# Patient Record
Sex: FEMALE | Race: WHITE | Employment: OTHER | URBAN - METROPOLITAN AREA
[De-identification: names, ages, dates, MRNs, and addresses within clinical notes are randomized per-mention and may not be internally consistent; named-entity substitution may affect disease eponyms.]

---

## 2017-03-15 PROBLEM — R25.1 SPELLS OF TREMBLING: Status: ACTIVE | Noted: 2017-03-15

## 2017-03-18 PROCEDURE — 84206 ASSAY OF PROINSULIN: CPT | Performed by: INTERNAL MEDICINE

## 2017-03-18 PROCEDURE — 36415 COLL VENOUS BLD VENIPUNCTURE: CPT | Performed by: INTERNAL MEDICINE

## 2017-04-07 PROCEDURE — 83835 ASSAY OF METANEPHRINES: CPT | Performed by: INTERNAL MEDICINE

## 2017-04-07 PROCEDURE — 36415 COLL VENOUS BLD VENIPUNCTURE: CPT | Performed by: INTERNAL MEDICINE

## 2017-04-07 PROCEDURE — 82384 ASSAY THREE CATECHOLAMINES: CPT | Performed by: INTERNAL MEDICINE

## 2017-04-07 PROCEDURE — 82570 ASSAY OF URINE CREATININE: CPT | Performed by: INTERNAL MEDICINE

## 2017-04-25 ENCOUNTER — OFFICE VISIT (OUTPATIENT)
Dept: INTERNAL MEDICINE CLINIC | Facility: CLINIC | Age: 57
End: 2017-04-25

## 2017-04-25 VITALS
RESPIRATION RATE: 16 BRPM | TEMPERATURE: 98 F | WEIGHT: 128 LBS | HEIGHT: 66 IN | DIASTOLIC BLOOD PRESSURE: 78 MMHG | OXYGEN SATURATION: 96 % | HEART RATE: 71 BPM | BODY MASS INDEX: 20.57 KG/M2 | SYSTOLIC BLOOD PRESSURE: 122 MMHG

## 2017-04-25 DIAGNOSIS — R00.2 PALPITATIONS: ICD-10-CM

## 2017-04-25 DIAGNOSIS — R42 EPISODIC LIGHTHEADEDNESS: ICD-10-CM

## 2017-04-25 DIAGNOSIS — I10 ESSENTIAL HYPERTENSION: Primary | ICD-10-CM

## 2017-04-25 PROBLEM — R25.1 SPELLS OF TREMBLING: Status: RESOLVED | Noted: 2017-03-15 | Resolved: 2017-04-25

## 2017-04-25 PROBLEM — G47.00 INSOMNIA: Status: ACTIVE | Noted: 2017-04-25

## 2017-04-25 PROCEDURE — 99204 OFFICE O/P NEW MOD 45 MIN: CPT | Performed by: INTERNAL MEDICINE

## 2017-04-26 NOTE — PROGRESS NOTES
Patient presents with:  Establish Care: b/p issues, palpitations; chronic and episodic lightheadedness/dissociation feeling      HPI: The pt is a 50 y/o WF, new patient, here to establish PCP and to discuss multiple medical concerns:    1.  HTN - Onset = 3 negative.      Patient Active Problem List:     Anxiety disorder     Essential hypertension     Palpitations     Insomnia          Past Surgical History    CHOLECYSTECTOMY            Comment 2    OTHER SURGICAL HISTORY      Comment Carpal tunnel ri Bystolic as scheduled for now. 2. Palpitations - Check 24-hr Holter monitor and 2D-Echo w/ dopplers. Reassurance given. 3. Episodic lightheadedness and dissociation feeling - Check carotid dopplers and Rheum/Autoimmune panel. Reassurance given.   4. RTC

## 2017-05-05 PROCEDURE — 86200 CCP ANTIBODY: CPT | Performed by: INTERNAL MEDICINE

## 2017-05-08 ENCOUNTER — TELEPHONE (OUTPATIENT)
Dept: INTERNAL MEDICINE CLINIC | Facility: CLINIC | Age: 57
End: 2017-05-08

## 2017-05-08 NOTE — TELEPHONE ENCOUNTER
Pt called in c/o L arm shaking, numbness, tingling, generalized weakness, being light headed/dizzy, vision changes and \"brain fog\". Denies chest pain/SOB/HA/unable to raise arms/dragging LE/asymmetry of smile and did not have slurred speech on the phone.

## 2017-05-10 ENCOUNTER — HOSPITAL ENCOUNTER (OUTPATIENT)
Dept: CV DIAGNOSTICS | Facility: HOSPITAL | Age: 57
Discharge: HOME | End: 2017-05-10
Attending: INTERNAL MEDICINE
Payer: COMMERCIAL

## 2017-05-10 ENCOUNTER — HOSPITAL ENCOUNTER (OUTPATIENT)
Dept: CARDIOLOGY CLINIC | Facility: HOSPITAL | Age: 57
Discharge: HOME | End: 2017-05-10
Attending: INTERNAL MEDICINE
Payer: COMMERCIAL

## 2017-05-10 DIAGNOSIS — R00.2 PALPITATIONS: ICD-10-CM

## 2017-05-10 DIAGNOSIS — R42 EPISODIC LIGHTHEADEDNESS: ICD-10-CM

## 2017-05-10 DIAGNOSIS — I10 ESSENTIAL HYPERTENSION: ICD-10-CM

## 2017-05-10 PROCEDURE — 93880 EXTRACRANIAL BILAT STUDY: CPT | Performed by: INTERNAL MEDICINE

## 2017-05-10 PROCEDURE — 93225 XTRNL ECG REC<48 HRS REC: CPT | Performed by: INTERNAL MEDICINE

## 2017-05-10 PROCEDURE — 93226 XTRNL ECG REC<48 HR SCAN A/R: CPT | Performed by: INTERNAL MEDICINE

## 2017-05-10 PROCEDURE — 93306 TTE W/DOPPLER COMPLETE: CPT | Performed by: INTERNAL MEDICINE

## 2017-05-10 PROCEDURE — 93227 XTRNL ECG REC<48 HR R&I: CPT | Performed by: INTERNAL MEDICINE

## 2017-05-11 ENCOUNTER — OFFICE VISIT (OUTPATIENT)
Dept: INTERNAL MEDICINE CLINIC | Facility: CLINIC | Age: 57
End: 2017-05-11

## 2017-05-11 VITALS
WEIGHT: 128.5 LBS | BODY MASS INDEX: 20.65 KG/M2 | SYSTOLIC BLOOD PRESSURE: 138 MMHG | DIASTOLIC BLOOD PRESSURE: 98 MMHG | RESPIRATION RATE: 16 BRPM | HEIGHT: 66 IN | OXYGEN SATURATION: 98 % | TEMPERATURE: 98 F | HEART RATE: 60 BPM

## 2017-05-11 DIAGNOSIS — M54.12 CERVICAL RADICULOPATHY: Primary | ICD-10-CM

## 2017-05-11 DIAGNOSIS — R29.898 LEFT ARM WEAKNESS: ICD-10-CM

## 2017-05-11 DIAGNOSIS — R20.0 LEFT ARM NUMBNESS: ICD-10-CM

## 2017-05-11 PROCEDURE — 99214 OFFICE O/P EST MOD 30 MIN: CPT | Performed by: INTERNAL MEDICINE

## 2017-05-11 NOTE — PATIENT INSTRUCTIONS
Understanding Cervical Radiculopathy    Cervical radiculopathy is irritation or inflammation of a nerve root in the neck. It causes neck pain and other symptoms that may spread into the chest or down the arm.  To understand this condition, it helps to und In most cases, your healthcare provider will first try treatments that help relieve symptoms. These may include:  · Prescription or over-the-counter pain medicines. These help relieve pain and swelling. · Cold packs. These help reduce pain. · Resting.  Susan Calderon

## 2017-05-11 NOTE — PROGRESS NOTES
Patient presents with:  Numbness: left arm just started last night on and off       HPI: The pt presents today for eval of 3 episodes over the last 3 months of neck discomfort along w/ L arm weakness and L arm numbness.   She's noticed more of the weakness/ elements are normal. The disc spaces are normal.    Impression: Normal cervical spine  Electronically Signed by: Flo Delgado             A/P:  Cervical radiculopathy  (primary encounter diagnosis)  Left arm weakness  Left arm numbness    1.  Cervical rad

## 2017-05-12 ENCOUNTER — TELEPHONE (OUTPATIENT)
Dept: INTERNAL MEDICINE CLINIC | Facility: CLINIC | Age: 57
End: 2017-05-12

## 2017-05-12 DIAGNOSIS — R42 EPISODIC LIGHTHEADEDNESS: Primary | ICD-10-CM

## 2017-05-12 DIAGNOSIS — R29.898 LEFT ARM WEAKNESS: ICD-10-CM

## 2017-05-15 NOTE — TELEPHONE ENCOUNTER
Although I still don't think MS is likely, we'll go ahead and add an MRI brain w/ and w/o contrast.  Testing ordered. Also, please offer her that there is always an option to see a  Neurologist (Dr. Abisai Pack), if she still has any concerns. Jacque Doty.  Ramona

## 2017-05-15 NOTE — TELEPHONE ENCOUNTER
Pt informed. Pt concerned about possible MS symptoms and MRI cervical spine ordered. Additional testing? MRI brain?

## 2017-05-22 ENCOUNTER — OFFICE VISIT (OUTPATIENT)
Dept: ELECTROPHYSIOLOGY | Facility: HOSPITAL | Age: 57
End: 2017-05-22
Attending: INTERNAL MEDICINE
Payer: COMMERCIAL

## 2017-05-22 ENCOUNTER — HOSPITAL ENCOUNTER (OUTPATIENT)
Dept: MRI IMAGING | Facility: HOSPITAL | Age: 57
Discharge: HOME | End: 2017-05-22
Attending: INTERNAL MEDICINE
Payer: COMMERCIAL

## 2017-05-22 DIAGNOSIS — M54.12 CERVICAL RADICULOPATHY: ICD-10-CM

## 2017-05-22 DIAGNOSIS — R29.898 LEFT ARM WEAKNESS: ICD-10-CM

## 2017-05-22 DIAGNOSIS — R20.0 LEFT ARM NUMBNESS: ICD-10-CM

## 2017-05-22 PROCEDURE — 95910 NRV CNDJ TEST 7-8 STUDIES: CPT

## 2017-05-22 PROCEDURE — 95886 MUSC TEST DONE W/N TEST COMP: CPT

## 2017-05-22 PROCEDURE — 72141 MRI NECK SPINE W/O DYE: CPT | Performed by: INTERNAL MEDICINE

## 2017-05-22 NOTE — PROCEDURES
[address]      PATIENT'S NAME: Joel Del Cid   REFERRING PHYSICIAN: Rosa Maria Carey M.D.    PATIENT ACCOUNT #: [de-identified] LOCATION: [facility]   MEDICAL RECORD #: [id number] DATE OF BIRTH: [birth year]

## 2017-05-26 ENCOUNTER — PATIENT MESSAGE (OUTPATIENT)
Dept: INTERNAL MEDICINE CLINIC | Facility: CLINIC | Age: 57
End: 2017-05-26

## 2017-05-29 NOTE — TELEPHONE ENCOUNTER
Get patient the info to [facility] EMERGENCY [facility], as they handle travel vaccine requests. Jefry Garcia. Leslie Colin MD  Diplomate, American Board of Internal Medicine  [facility]  [address], [address], [address] [address] [facility] [address]  T: [phone]; F: [phone]

## 2017-05-30 ENCOUNTER — PATIENT MESSAGE (OUTPATIENT)
Dept: INTERNAL MEDICINE CLINIC | Facility: CLINIC | Age: 57
End: 2017-05-30

## 2017-05-30 NOTE — TELEPHONE ENCOUNTER
From: Genesis Blackman  To: Pascual Kurtz MD  Sent: 5/30/2017 2:45 PM CDT  Subject: Non-Urgent Medical Question    Hello - sorry to be a pain. .. as you know, i'm having neurological issues.  Numbness in hands, feet, face and occasionally upper check/neck area

## 2017-06-01 ENCOUNTER — HOSPITAL ENCOUNTER (OUTPATIENT)
Dept: MRI IMAGING | Facility: HOSPITAL | Age: 57
Discharge: HOME | End: 2017-06-01
Attending: INTERNAL MEDICINE
Payer: COMMERCIAL

## 2017-06-01 DIAGNOSIS — R29.898 LEFT ARM WEAKNESS: ICD-10-CM

## 2017-06-01 DIAGNOSIS — R42 EPISODIC LIGHTHEADEDNESS: ICD-10-CM

## 2017-06-01 PROCEDURE — 70553 MRI BRAIN STEM W/O & W/DYE: CPT | Performed by: INTERNAL MEDICINE

## 2017-06-01 PROCEDURE — A9575 INJ GADOTERATE MEGLUMI 0.1ML: HCPCS | Performed by: INTERNAL MEDICINE

## 2017-06-02 ENCOUNTER — PATIENT MESSAGE (OUTPATIENT)
Dept: INTERNAL MEDICINE CLINIC | Facility: CLINIC | Age: 57
End: 2017-06-02

## 2017-06-02 NOTE — TELEPHONE ENCOUNTER
From: Keya Ingram  To: Leia Mcardle, MD  Sent: 6/2/2017 8:50 AM CDT  Subject: Non-Urgent Medical Question    Hi Dr. Juan J Goodman. Happy Friday :)  I saw your comments on my Brain MRI. 1) Question - does it look like it's NOT MS?  I'm really worried about th

## 2017-06-05 ENCOUNTER — PATIENT MESSAGE (OUTPATIENT)
Dept: INTERNAL MEDICINE CLINIC | Facility: CLINIC | Age: 57
End: 2017-06-05

## 2017-06-05 DIAGNOSIS — I10 ESSENTIAL HYPERTENSION: Primary | ICD-10-CM

## 2017-06-05 NOTE — TELEPHONE ENCOUNTER
From: Oracio Carballo  To: Ruel Silva MD  Sent: 6/5/2017 8:21 AM CDT  Subject: Non-Urgent Medical Question    Hi. 1) could not figure out how to reply to message but regarding BP control Losartan 25 mg 1 tab each morning - tkx!  I will hold off till i com

## 2017-06-06 RX ORDER — LOSARTAN POTASSIUM 25 MG/1
25 TABLET ORAL EVERY MORNING
Qty: 90 TABLET | Refills: 1 | Status: SHIPPED | OUTPATIENT
Start: 2017-06-06 | End: 2017-07-06

## 2017-06-08 ENCOUNTER — PATIENT MESSAGE (OUTPATIENT)
Dept: INTERNAL MEDICINE CLINIC | Facility: CLINIC | Age: 57
End: 2017-06-08

## 2017-06-09 RX ORDER — NEBIVOLOL 5 MG/1
TABLET ORAL
Qty: 30 TABLET | Refills: 0 | Status: SHIPPED | OUTPATIENT
Start: 2017-06-09 | End: 2017-06-13

## 2017-06-09 NOTE — TELEPHONE ENCOUNTER
From: Marda Rinne  To: Jyoti Shields MD  Sent: 6/8/2017 6:32 PM CDT  Subject: Prescription Question    Hello dr Bentley Moran . I have a problem. I only have 10 day supply of bystolic and [place] 8 days.  So I don't have enough

## 2017-06-13 ENCOUNTER — TELEPHONE (OUTPATIENT)
Dept: INTERNAL MEDICINE CLINIC | Facility: CLINIC | Age: 57
End: 2017-06-13

## 2017-06-13 RX ORDER — NEBIVOLOL 5 MG/1
TABLET ORAL
Qty: 90 TABLET | Refills: 0 | Status: SHIPPED | OUTPATIENT
Start: 2017-06-13 | End: 2017-09-12

## 2017-06-13 NOTE — TELEPHONE ENCOUNTER
Call patient. 90-day supply of the Bystolic has been sent to her pharmacy. Jn Ray. Christa Love MD  Diplomate, American Board of Internal Medicine  [facility]  [address]  [facility],4Th Floor, [address], [facility], [address]  T: [phone]; F: [address]

## 2017-06-13 NOTE — TELEPHONE ENCOUNTER
Pt called to request a 90 days supply on Byastolic as prescription will be cheaper than 30 days supply. Please advise.

## 2017-06-16 ENCOUNTER — TELEPHONE (OUTPATIENT)
Dept: NEUROLOGY | Facility: CLINIC | Age: 57
End: 2017-06-16

## 2017-06-16 ENCOUNTER — OFFICE VISIT (OUTPATIENT)
Dept: NEUROLOGY | Facility: CLINIC | Age: 57
End: 2017-06-16

## 2017-06-16 ENCOUNTER — LAB ENCOUNTER (OUTPATIENT)
Dept: LAB | Age: 57
End: 2017-06-16
Attending: Other
Payer: COMMERCIAL

## 2017-06-16 VITALS
SYSTOLIC BLOOD PRESSURE: 132 MMHG | BODY MASS INDEX: 20 KG/M2 | RESPIRATION RATE: 16 BRPM | HEART RATE: 56 BPM | WEIGHT: 126 LBS | DIASTOLIC BLOOD PRESSURE: 88 MMHG

## 2017-06-16 DIAGNOSIS — R20.2 PARESTHESIA: ICD-10-CM

## 2017-06-16 DIAGNOSIS — R20.2 PARESTHESIA: Primary | ICD-10-CM

## 2017-06-16 PROCEDURE — 86235 NUCLEAR ANTIGEN ANTIBODY: CPT | Performed by: OTHER

## 2017-06-16 PROCEDURE — 36415 COLL VENOUS BLD VENIPUNCTURE: CPT | Performed by: OTHER

## 2017-06-16 PROCEDURE — 84165 PROTEIN E-PHORESIS SERUM: CPT | Performed by: OTHER

## 2017-06-16 PROCEDURE — 86038 ANTINUCLEAR ANTIBODIES: CPT | Performed by: OTHER

## 2017-06-16 PROCEDURE — 82746 ASSAY OF FOLIC ACID SERUM: CPT | Performed by: OTHER

## 2017-06-16 PROCEDURE — 86334 IMMUNOFIX E-PHORESIS SERUM: CPT | Performed by: OTHER

## 2017-06-16 PROCEDURE — 99244 OFF/OP CNSLTJ NEW/EST MOD 40: CPT | Performed by: OTHER

## 2017-06-16 PROCEDURE — 86431 RHEUMATOID FACTOR QUANT: CPT | Performed by: OTHER

## 2017-06-16 PROCEDURE — 84425 ASSAY OF VITAMIN B-1: CPT | Performed by: OTHER

## 2017-06-16 PROCEDURE — 82607 VITAMIN B-12: CPT | Performed by: OTHER

## 2017-06-16 PROCEDURE — 86147 CARDIOLIPIN ANTIBODY EA IG: CPT | Performed by: OTHER

## 2017-06-16 PROCEDURE — 83921 ORGANIC ACID SINGLE QUANT: CPT | Performed by: OTHER

## 2017-06-16 PROCEDURE — 83883 ASSAY NEPHELOMETRY NOT SPEC: CPT | Performed by: OTHER

## 2017-06-16 NOTE — PATIENT INSTRUCTIONS
Refill policies:    • Allow 2-3 business days for refills; controlled substances may take longer.   • Contact your pharmacy at least 5 days prior to running out of medication and have them send an electronic request or submit request through the [facility] have a procedure or additional testing performed. [facility] will contact your insurance carrier to obtain pre-certification or prior authorization.     Unfortunately, [facility] has seen an increase in denial of payment even though the p

## 2017-06-16 NOTE — PROGRESS NOTES
Pt ever for evaluation for intermittent numbness and tingling, primarily in hands and feet. Also reports intermittent dizziness, heart racing, and will sometimes wake up feeling like her body is \"buzzing. \"

## 2017-06-16 NOTE — PROGRESS NOTES
[facility]    Date of consult: 6/16/2017    CC: paresthesia    HPI: Beth Linares is a 49year old female with past medical history as listed below presents here for initial evaluation of paresthesia.  Pt ever for evaluation for 0 Standard drinks or equivalent per week         Comment: socially     Family History   Problem Relation Age of Onset   • Hypertension Father    • Stroke Father    • Parkinson's Dosease[other] [OTHER] Father    • Alcoholism[other] [OTHER] Father    • Ca Screen  B12 AND FOLATE  Methylmalonic Acid, Serum  PROTEIN ELECTROPHORESE,SERUM WITH RLFX TO JUDSON,SERUM  Rheumatoid Arthritis Factor  Vitamin B1 (Thiamine), Blood  Sjogren's AB, Anti-SS-A/-SS-B  Anticardiolipin Antibodies (CRISTIAN), Quant  Advised pt to cut rabia

## 2017-06-26 ENCOUNTER — TELEPHONE (OUTPATIENT)
Dept: NEUROLOGY | Facility: CLINIC | Age: 57
End: 2017-06-26

## 2017-06-26 DIAGNOSIS — D68.61 ANTI-PHOSPHOLIPID ANTIBODY SYNDROME (HCC): Primary | ICD-10-CM

## 2017-06-26 NOTE — PATIENT INSTRUCTIONS
Cardiolipin Antibody  Does this test have other names? Cardiolipin antibodies (IgG, IgM, IgA), anticardiolipin   What is this test?  A cardiolipin antibodies test looks for a certain kind of antibody in your blood.  The antibodies are IgG (immunoglobulin A result for a lab test may be affected by many things, including the method the laboratory uses to do the test. Even if your test results are different from the normal value, you may not have a problem.  To learn what the results mean for you, talk with pavan What is this test?  A cardiolipin antibodies test looks for a certain kind of antibody in your blood. The antibodies are IgG (immunoglobulin G), IgA (immunoglobulin A), and IgM (immunoglobulin M). They are antibodies that form in response to cardiolipins.

## 2017-06-26 NOTE — TELEPHONE ENCOUNTER
Left message for patient to call back. When returns call , clinical staff please relay lab results and recommendations  below per Dr Kolton Ruvalcaba.

## 2017-06-26 NOTE — TELEPHONE ENCOUNTER
Phospholipid Ab, IgM Negative  Medium Positive      Above lab abnormal, I would like to have Rheumatologist's opinion on this, please let pt know.   I have put in Rheumatology referral.

## 2017-06-26 NOTE — TELEPHONE ENCOUNTER
Patient called back. Relayed lab results below. She verbalized understanding. No further questions at this time.      Given contact information for Dr Murphy Fitch or one of his associates     Rheumatology  [facility]  [address]

## 2017-06-26 NOTE — TELEPHONE ENCOUNTER
Spoke with patient. She has scheduled appointment with Dr Rafaela Nunn.   Future Appointments  Date Time Provider Reagan Guadalupe   8/3/2017 8:40 AM Eleuterio Dsouza MD ONPV RHEUM JEN NPV   8/7/2017 1:20 PM Brian Sheridan MD [facility] [address] [facility] [address]

## 2017-07-06 ENCOUNTER — OFFICE VISIT (OUTPATIENT)
Dept: INTERNAL MEDICINE CLINIC | Facility: CLINIC | Age: 57
End: 2017-07-06

## 2017-07-06 VITALS
DIASTOLIC BLOOD PRESSURE: 80 MMHG | HEART RATE: 60 BPM | TEMPERATURE: 98 F | HEIGHT: 66.25 IN | BODY MASS INDEX: 19.81 KG/M2 | RESPIRATION RATE: 15 BRPM | SYSTOLIC BLOOD PRESSURE: 116 MMHG | WEIGHT: 123.25 LBS

## 2017-07-06 DIAGNOSIS — I10 ESSENTIAL HYPERTENSION: Primary | ICD-10-CM

## 2017-07-06 DIAGNOSIS — K63.9 INTESTINAL DISORDER: ICD-10-CM

## 2017-07-06 PROCEDURE — 99213 OFFICE O/P EST LOW 20 MIN: CPT | Performed by: INTERNAL MEDICINE

## 2017-07-06 RX ORDER — ZOLPIDEM TARTRATE 6.25 MG/1
TABLET, FILM COATED, EXTENDED RELEASE ORAL
Refills: 3 | COMMUNITY
Start: 2017-06-26 | End: 2017-07-06

## 2017-07-06 NOTE — PROGRESS NOTES
Patient presents with:  HTN: 3-month f/u  Other: Possible leaky gut syndrome      HPI: The pt presents today for 3-month f/u HTN and also informs me that she has a possible working diagnosis of \"leaky gut syndrome. \"  For the HTN, she's only taking Bystol Sheila Sparks. Severo Obey, MD  Diplomate, American Board of Internal Medicine  [facility]  [address] [facility],[address], [address], [facility], [address] [place] [address]  T: [phone]; F: [address]     Meds & Refills for this Visit:  No prescriptions requested or ordered in

## 2017-08-01 ENCOUNTER — PATIENT MESSAGE (OUTPATIENT)
Dept: INTERNAL MEDICINE CLINIC | Facility: CLINIC | Age: 57
End: 2017-08-01

## 2017-08-01 NOTE — TELEPHONE ENCOUNTER
From: Oracio Carballo  To: Ruel Silva MD  Sent: 8/1/2017 10:45 AM CDT  Subject: Referral Velasquez Styles. My , Javier Tineo, would like to see a podiatrist for foot issue.  Can you provide a recommended dr (for him and me for future nee

## 2017-08-08 PROBLEM — D68.61 ANTI-PHOSPHOLIPID ANTIBODY SYNDROME (HCC): Status: RESOLVED | Noted: 2017-06-26 | Resolved: 2017-08-08

## 2017-08-08 PROCEDURE — 87086 URINE CULTURE/COLONY COUNT: CPT | Performed by: INTERNAL MEDICINE

## 2017-08-08 PROCEDURE — 82570 ASSAY OF URINE CREATININE: CPT | Performed by: INTERNAL MEDICINE

## 2017-08-08 PROCEDURE — 86618 LYME DISEASE ANTIBODY: CPT | Performed by: INTERNAL MEDICINE

## 2017-08-08 PROCEDURE — 81001 URINALYSIS AUTO W/SCOPE: CPT | Performed by: INTERNAL MEDICINE

## 2017-08-08 PROCEDURE — 36415 COLL VENOUS BLD VENIPUNCTURE: CPT | Performed by: INTERNAL MEDICINE

## 2017-08-08 PROCEDURE — 84156 ASSAY OF PROTEIN URINE: CPT | Performed by: INTERNAL MEDICINE

## 2017-08-08 PROCEDURE — 86160 COMPLEMENT ANTIGEN: CPT | Performed by: INTERNAL MEDICINE

## 2017-08-10 ENCOUNTER — OFFICE VISIT (OUTPATIENT)
Dept: NEUROLOGY | Facility: CLINIC | Age: 57
End: 2017-08-10

## 2017-08-10 DIAGNOSIS — R76.0 ANTIPHOSPHOLIPID ANTIBODY POSITIVE: ICD-10-CM

## 2017-08-10 DIAGNOSIS — R20.2 PARESTHESIA: Primary | ICD-10-CM

## 2017-08-10 PROCEDURE — 95911 NRV CNDJ TEST 9-10 STUDIES: CPT | Performed by: OTHER

## 2017-08-10 PROCEDURE — 95886 MUSC TEST DONE W/N TEST COMP: CPT | Performed by: OTHER

## 2017-08-10 PROCEDURE — 99215 OFFICE O/P EST HI 40 MIN: CPT | Performed by: OTHER

## 2017-08-10 NOTE — PROCEDURES
Date of service: 8/10/2017    Procedure: Nerve Conduction Study and Electromyography - complete EMG study in right upper and bilateral lower extremities.     History: Electrodiagnostic testing on this 49year old female was performed in right upper and bila Ankle AH 4.06 ?6.00 24.3 ?3.0 100  100 ?50.00 Ankle - AH 8        Knee AH 11.15  20.3  83.7 ?115 100  Knee - Ankle 36 51 ?39   L Tibial - AH      Ankle AH 4.58 ?6.00 17.6 ?3.0 100  72.7 ?50.00 Ankle - AH 8        Knee AH 11.88  16.3  92.4 ?115 80.2  Kne A cervical radiculopathy above C5 level or chronic C5-T1, L2-S2 radiculopathy cannot be completely excluded from this study, Clinical correlation is recommended.      Corine Hadley MD  Neurology  [facility]  8/10/2017, 3:39

## 2017-08-10 NOTE — PROGRESS NOTES
[facility] outpatient progress note  Date of service: 8/10/2017    Patient here for a follow-up visit for paresthesia.  No new complaints, she actually felt better after she cut down drinking, she has seen Rheumatologist re: positive antiphospholipid ant HISTORY      Comment: Bilateral knee surgery   No date: OTHER SURGICAL HISTORY      Comment: Oral tooth extraction surgery   Social History:     Smoking status: Never Smoker    Smokeless tobacco: Never Used    Alcohol use Yes  0.0 oz/week     Comment: soci

## 2017-08-11 PROCEDURE — 81003 URINALYSIS AUTO W/O SCOPE: CPT | Performed by: INTERNAL MEDICINE

## 2017-08-30 ENCOUNTER — PATIENT MESSAGE (OUTPATIENT)
Dept: INTERNAL MEDICINE CLINIC | Facility: CLINIC | Age: 57
End: 2017-08-30

## 2017-09-13 RX ORDER — NEBIVOLOL HYDROCHLORIDE 5 MG/1
TABLET ORAL
Qty: 90 TABLET | Refills: 0 | Status: SHIPPED | OUTPATIENT
Start: 2017-09-13 | End: 2018-04-09

## 2017-10-05 ENCOUNTER — OFFICE VISIT (OUTPATIENT)
Dept: INTERNAL MEDICINE CLINIC | Facility: CLINIC | Age: 57
End: 2017-10-05

## 2017-10-05 VITALS
HEART RATE: 67 BPM | TEMPERATURE: 98 F | RESPIRATION RATE: 15 BRPM | BODY MASS INDEX: 19.89 KG/M2 | DIASTOLIC BLOOD PRESSURE: 80 MMHG | SYSTOLIC BLOOD PRESSURE: 118 MMHG | WEIGHT: 123.75 LBS | HEIGHT: 66.25 IN

## 2017-10-05 DIAGNOSIS — I10 ESSENTIAL HYPERTENSION: Primary | ICD-10-CM

## 2017-10-05 DIAGNOSIS — G24.5 BLEPHAROSPASM: ICD-10-CM

## 2017-10-05 PROCEDURE — 99213 OFFICE O/P EST LOW 20 MIN: CPT | Performed by: INTERNAL MEDICINE

## 2017-10-05 RX ORDER — LOSARTAN POTASSIUM 25 MG/1
TABLET ORAL
Refills: 1 | COMMUNITY
Start: 2017-09-05 | End: 2017-10-05

## 2017-10-05 RX ORDER — ZOLPIDEM TARTRATE 5 MG/1
TABLET ORAL
Refills: 3 | COMMUNITY
Start: 2017-09-07 | End: 2018-12-14

## 2017-10-05 NOTE — PROGRESS NOTES
Patient presents with: Follow - Up: b/p follow up   Eye Spasm: R eye lower eye lid      HPI: The pt presents today for eval of 2 issues:  1. HTN - Chronic problem. Stable on Bystolic. Doing well. Home BPs are controlled.   No medication SEs.  2. Eyelid HTN - Stable on prescription medication. No new issues. 2. Blepharospasm - Handout given on this topic. Advised support measures. 3. RTC 6 months for f/u. Patient verbally agrees to and understands the plan as outlined above.   Patient was also afforde

## 2017-10-05 NOTE — PATIENT INSTRUCTIONS
Eye Twitching  An eye twitch is an automatic blinking of your eyelid that you can’t control. This abnormal blinking may happen many times per day. If eye twitching is severe, it can cause problems with your eyesight. What causes eye twitching?   The eyel Symptoms may go away when you sleep, or focus on a difficult task. The symptoms may go away when you talk, sing, or touch another part of your body.   · Other things may cause symptoms, such as:  · Feeling very tired or weak  · Stress  · Bright lights  · Dr · Not have caffeine  · Get enough sleep  · Reduce your stress  · Use eye drops if you have eye irritation  · Wear sunglasses when needed     When to call your health care provider  Call your health care provider right away if you have any of the following:

## 2017-10-30 ENCOUNTER — TELEPHONE (OUTPATIENT)
Dept: NEUROLOGY | Facility: CLINIC | Age: 57
End: 2017-10-30

## 2017-10-30 ENCOUNTER — TELEPHONE (OUTPATIENT)
Dept: SURGERY | Facility: CLINIC | Age: 57
End: 2017-10-30

## 2017-10-30 ENCOUNTER — OFFICE VISIT (OUTPATIENT)
Dept: NEUROLOGY | Facility: CLINIC | Age: 57
End: 2017-10-30

## 2017-10-30 DIAGNOSIS — Z02.9 ENCOUNTERS FOR ADMINISTRATIVE PURPOSES: Primary | ICD-10-CM

## 2017-10-30 NOTE — PATIENT INSTRUCTIONS
Refill policies:    • Allow 2-3 business days for refills; controlled substances may take longer.   • Contact your pharmacy at least 5 days prior to running out of medication and have them send an electronic request or submit request through the [facility] have a procedure or additional testing performed. [facility]) will contact your insurance carrier to obtain pre-certification or prior authorization.     Unfortunately, [facility] has seen an increase in denial of payment even though the p

## 2017-10-30 NOTE — TELEPHONE ENCOUNTER
Skin biopsy scheduled for 11/28/17 in [facility] @ [facility], kit ordered. RASH

## 2017-11-22 ENCOUNTER — TELEPHONE (OUTPATIENT)
Dept: INTERNAL MEDICINE CLINIC | Facility: CLINIC | Age: 57
End: 2017-11-22

## 2017-11-23 NOTE — TELEPHONE ENCOUNTER
Call patient. Please inquire when last mammogram took place and let me know. I do not see any record of mammogram being done in [facility]. [facility]. Christa Love MD  Diplomate, American Board of Internal Medicine  [facility]  [address] [facility]

## 2017-11-27 NOTE — TELEPHONE ENCOUNTER
Left detailed message on pt's vm to schedule a mammogram, ok per HIPPA, also to call back if any further questions.

## 2017-11-28 ENCOUNTER — OFFICE VISIT (OUTPATIENT)
Dept: NEUROLOGY | Facility: CLINIC | Age: 57
End: 2017-11-28

## 2017-11-28 VITALS — HEART RATE: 62 BPM | RESPIRATION RATE: 16 BRPM | SYSTOLIC BLOOD PRESSURE: 122 MMHG | DIASTOLIC BLOOD PRESSURE: 64 MMHG

## 2017-11-28 DIAGNOSIS — M79.609 PAIN IN EXTREMITY, UNSPECIFIED EXTREMITY: ICD-10-CM

## 2017-11-28 DIAGNOSIS — R76.0 ANTIPHOSPHOLIPID ANTIBODY POSITIVE: ICD-10-CM

## 2017-11-28 DIAGNOSIS — R20.2 PARESTHESIA: Primary | ICD-10-CM

## 2017-11-28 PROCEDURE — 11101 BIOPSY, EACH ADDED LESION: CPT | Performed by: OTHER

## 2017-11-28 PROCEDURE — 11100 BIOPSY OF SKIN LESION: CPT | Performed by: OTHER

## 2017-11-28 RX ORDER — LIDOCAINE HYDROCHLORIDE AND EPINEPHRINE 20; 5 MG/ML; UG/ML
1 INJECTION, SOLUTION EPIDURAL; INFILTRATION; INTRACAUDAL; PERINEURAL ONCE
Status: COMPLETED | OUTPATIENT
Start: 2017-11-28 | End: 2017-11-28

## 2017-11-28 NOTE — PROCEDURES
Skin punch biopsy Procedure Note    Date of service: 11/28/2017    Procedure: Skin punch biopsy x 2  Consent: obtained after benefit and risks being explained to patient    Indication: Diagnosis of small fiber sensory neuropathy, Pain in limbs    Procedure

## 2017-11-28 NOTE — PATIENT INSTRUCTIONS
Refill policies:    • Allow 2-3 business days for refills; controlled substances may take longer.   • Contact your pharmacy at least 5 days prior to running out of medication and have them send an electronic request or submit request through the [facility] have a procedure or additional testing performed. [facility]) will contact your insurance carrier to obtain pre-certification or prior authorization.     Unfortunately, [facility] has seen an increase in denial of payment even though the p

## 2017-12-18 ENCOUNTER — TELEPHONE (OUTPATIENT)
Dept: NEUROLOGY | Facility: CLINIC | Age: 57
End: 2017-12-18

## 2017-12-19 NOTE — TELEPHONE ENCOUNTER
Skin punch biopsy is c/w small fiber neuropathy, please let pt know, ok to make appt to discuss more in person.

## 2017-12-19 NOTE — TELEPHONE ENCOUNTER
Relayed test results to patient. Verbalized understanding. Is agreeable to making appt to discuss further. Accepted 1/11/18 at 2:40PM. Gave info to TRACEY SORIANO to schedule.

## 2018-01-04 RX ORDER — NEBIVOLOL HYDROCHLORIDE 5 MG/1
TABLET ORAL
Qty: 30 TABLET | Refills: 2 | Status: SHIPPED | OUTPATIENT
Start: 2018-01-04 | End: 2018-01-11

## 2018-01-04 NOTE — TELEPHONE ENCOUNTER
bystolic 5 mg 1 tab daily filled 9-13-17 90 with 0 refills     LOV 10-5-17     HTN - Chronic problem. Stable on Bystolic     RTC 6 months for f/u.      Labs 8-8-17

## 2018-01-09 ENCOUNTER — TELEPHONE (OUTPATIENT)
Dept: NEUROLOGY | Facility: CLINIC | Age: 58
End: 2018-01-09

## 2018-01-09 ENCOUNTER — TELEPHONE (OUTPATIENT)
Dept: INTERNAL MEDICINE CLINIC | Facility: CLINIC | Age: 58
End: 2018-01-09

## 2018-01-09 NOTE — TELEPHONE ENCOUNTER
Left a message for patient to call back. When returns call please see reason for call and get more information.

## 2018-01-09 NOTE — TELEPHONE ENCOUNTER
Per Dr Pipe Corbin notes 8/10/17:     A/P:   R20.2) Paresthesia  (primary encounter diagnosis) of undetermined etiology  No evidence of MS  ddx small fiber neuropathy, perimenopausal related symptoms, functional symptoms  Plan:  EMG in right UE and b/l LEs wer

## 2018-01-09 NOTE — TELEPHONE ENCOUNTER
Patient called back, states she continues to have Numbness in both hands and feet associated with light headedness and anxiety on and off but patient noticed it has been worsening since yesterday.    Patient states she is going through menopause for the pas

## 2018-01-09 NOTE — TELEPHONE ENCOUNTER
Patient can be seen by one of specialist's physician partners or even his physician assistant. Did she inquire? I'd start there first.      Yesika Heart MD  Diplomate, American Board of Internal Medicine  [address] [address] [address]  [address] [facility],4Th Floor, [address]

## 2018-01-09 NOTE — TELEPHONE ENCOUNTER
Left detailed message on VM (ok per HIPAA) relaying Dr. Arturo Amezcua message. Office hours provided if pt has further questions/concerns.

## 2018-01-09 NOTE — TELEPHONE ENCOUNTER
I am unsure what vascular test you are referring to. but I am more than happy to discuss this more on Thursday with you when I see you.

## 2018-01-09 NOTE — TELEPHONE ENCOUNTER
Pt called to inform has an upcoming ov w Vascular specialist by end of Feb, as specialist do not have anything open before that. Pt requested if any other test for circulation can be order in the meantime. Please advise.

## 2018-01-11 ENCOUNTER — OFFICE VISIT (OUTPATIENT)
Dept: NEUROLOGY | Facility: CLINIC | Age: 58
End: 2018-01-11

## 2018-01-11 VITALS
RESPIRATION RATE: 14 BRPM | HEART RATE: 60 BPM | BODY MASS INDEX: 20 KG/M2 | WEIGHT: 127 LBS | DIASTOLIC BLOOD PRESSURE: 62 MMHG | SYSTOLIC BLOOD PRESSURE: 108 MMHG

## 2018-01-11 DIAGNOSIS — G62.9 SMALL FIBER NEUROPATHY: ICD-10-CM

## 2018-01-11 DIAGNOSIS — R76.0 ANTIPHOSPHOLIPID ANTIBODY POSITIVE: Primary | ICD-10-CM

## 2018-01-11 PROCEDURE — 99215 OFFICE O/P EST HI 40 MIN: CPT | Performed by: OTHER

## 2018-01-11 RX ORDER — DULOXETIN HYDROCHLORIDE 60 MG/1
60 CAPSULE, DELAYED RELEASE ORAL DAILY
Qty: 30 CAPSULE | Refills: 1 | Status: SHIPPED | OUTPATIENT
Start: 2018-01-11 | End: 2018-02-20

## 2018-01-11 NOTE — PROGRESS NOTES
[facility] Neurology outpatient progress note  Date of service: 1/11/2018    Patient here to follow up regarding numbness/tingling, on/off dizziness and to discuss skin biopsy results. Her symptoms have not improved since last visit.   She insists to see a vascula 3350-KCl-NaBcb-NaCl-NaSulf (PEG 3350/ELECTROLYTES) 240 g Oral Recon Soln, Take as directed by your physician., Disp: 1 Bottle, Rfl: 0  Allergies:  No Known Allergies  Past Medical History:   Diagnosis Date   • Essential hypertension      Past Surgical Hist flexor  Coordination: Normal FTN  Sensory: symmetric to PP and LT  Gait: nl  Romberg: nl  Neck: supple    Test reviewed on 1/11/2018    A/P:   Small fiber neuropathy: no improvement; ? Due to Antiphospholipid antibody positive vs alcohol related vs idiopat

## 2018-01-11 NOTE — PATIENT INSTRUCTIONS
Refill policies:    • Allow 2-3 business days for refills; controlled substances may take longer.   • Contact your pharmacy at least 5 days prior to running out of medication and have them send an electronic request or submit request through the [facility] recommended that you have a procedure or additional testing performed. [facility]) will contact your insurance carrier to obtain pre-certification or prior authorization.     Unfortunately, [facility] has seen an increase in denial of paym

## 2018-01-11 NOTE — PROGRESS NOTES
Patient here to follow up regarding numbness/tingling, on/off dizziness and to discuss skin biopsy results.

## 2018-01-18 PROCEDURE — 85613 RUSSELL VIPER VENOM DILUTED: CPT | Performed by: INTERNAL MEDICINE

## 2018-01-18 PROCEDURE — 86146 BETA-2 GLYCOPROTEIN ANTIBODY: CPT | Performed by: INTERNAL MEDICINE

## 2018-01-18 PROCEDURE — 86147 CARDIOLIPIN ANTIBODY EA IG: CPT | Performed by: INTERNAL MEDICINE

## 2018-01-18 PROCEDURE — 36415 COLL VENOUS BLD VENIPUNCTURE: CPT | Performed by: INTERNAL MEDICINE

## 2018-01-18 PROCEDURE — 85610 PROTHROMBIN TIME: CPT | Performed by: INTERNAL MEDICINE

## 2018-01-29 ENCOUNTER — HOSPITAL ENCOUNTER (OUTPATIENT)
Dept: MAMMOGRAPHY | Age: 58
Discharge: HOME | End: 2018-01-29
Attending: INTERNAL MEDICINE
Payer: COMMERCIAL

## 2018-01-29 DIAGNOSIS — Z12.31 ENCOUNTER FOR SCREENING MAMMOGRAM FOR MALIGNANT NEOPLASM OF BREAST: ICD-10-CM

## 2018-01-29 PROCEDURE — 77067 SCR MAMMO BI INCL CAD: CPT | Performed by: INTERNAL MEDICINE

## 2018-01-30 ENCOUNTER — PATIENT MESSAGE (OUTPATIENT)
Dept: NEUROLOGY | Facility: CLINIC | Age: 58
End: 2018-01-30

## 2018-01-30 ENCOUNTER — PATIENT MESSAGE (OUTPATIENT)
Dept: INTERNAL MEDICINE CLINIC | Facility: CLINIC | Age: 58
End: 2018-01-30

## 2018-01-30 DIAGNOSIS — R76.0 ANTIPHOSPHOLIPID ANTIBODY POSITIVE: Primary | ICD-10-CM

## 2018-01-30 NOTE — TELEPHONE ENCOUNTER
From: Min Serna  To: Rafi Newell MD  Sent: 1/30/2018 3:18 PM CST  Subject: Visit Follow-up Question    Hello. I would like to have additional small nerve fiber biopsies to see if i have damage in my legs and my right arm.  I only had the biopsies

## 2018-01-30 NOTE — TELEPHONE ENCOUNTER
From: Maris Culver  To: Margaret Patton MD  Sent: 1/30/2018 3:12 PM CST  Subject: Referral Request    HI - i need to get a referral to hematology - per message below from Dr. Tricia Mcneil. ...       From Dr Timo Alexander:    Because your PCP is based out of [place]

## 2018-01-31 NOTE — TELEPHONE ENCOUNTER
Referral placed for Dr. Massiel Montoya, notify pt. Sheila Sparks. Severo Obey, MD  Diplomate, American Board of Internal Medicine  [address] [facility]  [address] [facility],4Th Floor, [address], [facility], [address] [place] [address]  T: [phone]; F: [address]

## 2018-02-17 ENCOUNTER — TELEPHONE (OUTPATIENT)
Dept: NEUROLOGY | Facility: CLINIC | Age: 58
End: 2018-02-17

## 2018-02-17 DIAGNOSIS — G62.9 SMALL FIBER NEUROPATHY: Primary | ICD-10-CM

## 2018-02-17 NOTE — TELEPHONE ENCOUNTER
I was called by Ms Galvezsondra Anna on the phone regarding her recent initiation of Cymbalta. She states that she has only tried one tab of this medication . She reports that it made her nauseated and have diarrhea. She is worried that this was a med. Reaction.  I t

## 2018-02-19 NOTE — TELEPHONE ENCOUNTER
Per OV notes, Neurontin might be an option. Will route to see if Dr. Rajinder Valdez would like to order this as alternative to Cymbalta.

## 2018-02-20 RX ORDER — GABAPENTIN 100 MG/1
100 CAPSULE ORAL 3 TIMES DAILY
Qty: 90 CAPSULE | Refills: 1 | Status: SHIPPED | OUTPATIENT
Start: 2018-02-20 | End: 2018-04-09

## 2018-02-20 NOTE — TELEPHONE ENCOUNTER
I ordered low dose neurontin for pt to try if ok to her, neurontin can be gradually titrated up to 200 mg tid po for optimal effect if pt tolerates well. Mild drowsiness is a common side effect.

## 2018-02-20 NOTE — TELEPHONE ENCOUNTER
Spoke with patient. Relayed recommendations per Dr Carroll Chery below. Patient stated she would like to hold off on medication for now as she is consulting an orthopedic provider at this time  .

## 2018-03-20 ENCOUNTER — HOSPITAL ENCOUNTER (EMERGENCY)
Age: 58
Discharge: HOME | End: 2018-03-21
Attending: EMERGENCY MEDICINE
Payer: COMMERCIAL

## 2018-03-20 DIAGNOSIS — R03.0 TRANSIENT HYPERTENSION: Primary | ICD-10-CM

## 2018-03-20 DIAGNOSIS — F41.9 ANXIETY: ICD-10-CM

## 2018-03-20 DIAGNOSIS — R25.1 EPISODE OF SHAKING: ICD-10-CM

## 2018-03-20 LAB
ALBUMIN SERPL-MCNC: 3.9 G/DL (ref 3.5–4.8)
ALP LIVER SERPL-CCNC: 70 U/L (ref 41–108)
ALT SERPL-CCNC: 21 U/L (ref 14–54)
AST SERPL-CCNC: 22 U/L (ref 15–41)
BASOPHILS # BLD AUTO: 0.04 X10(3) UL (ref 0–0.1)
BASOPHILS NFR BLD AUTO: 0.6 %
BILIRUB SERPL-MCNC: 0.8 MG/DL (ref 0.1–2)
BUN BLD-MCNC: 12 MG/DL (ref 8–20)
CALCIUM BLD-MCNC: 8.8 MG/DL (ref 8.3–10.3)
CHLORIDE: 107 MMOL/L (ref 101–111)
CO2: 29 MMOL/L (ref 22–32)
CREAT BLD-MCNC: 0.85 MG/DL (ref 0.55–1.02)
EOSINOPHIL # BLD AUTO: 0.09 X10(3) UL (ref 0–0.3)
EOSINOPHIL NFR BLD AUTO: 1.4 %
ERYTHROCYTE [DISTWIDTH] IN BLOOD BY AUTOMATED COUNT: 11.3 % (ref 11.5–16)
GLUCOSE BLD-MCNC: 102 MG/DL (ref 70–99)
HCT VFR BLD AUTO: 37.2 % (ref 34–50)
HGB BLD-MCNC: 12.7 G/DL (ref 12–16)
IMMATURE GRANULOCYTE COUNT: 0.02 X10(3) UL (ref 0–1)
IMMATURE GRANULOCYTE RATIO %: 0.3 %
LYMPHOCYTES # BLD AUTO: 1.81 X10(3) UL (ref 0.9–4)
LYMPHOCYTES NFR BLD AUTO: 27.7 %
M PROTEIN MFR SERPL ELPH: 7.5 G/DL (ref 6.1–8.3)
MCH RBC QN AUTO: 32.7 PG (ref 27–33.2)
MCHC RBC AUTO-ENTMCNC: 34.1 G/DL (ref 31–37)
MCV RBC AUTO: 95.9 FL (ref 81–100)
MONOCYTES # BLD AUTO: 0.58 X10(3) UL (ref 0.1–1)
MONOCYTES NFR BLD AUTO: 8.9 %
NEUTROPHIL ABS PRELIM: 3.99 X10 (3) UL (ref 1.3–6.7)
NEUTROPHILS # BLD AUTO: 3.99 X10(3) UL (ref 1.3–6.7)
NEUTROPHILS NFR BLD AUTO: 61.1 %
PLATELET # BLD AUTO: 224 10(3)UL (ref 150–450)
POTASSIUM SERPL-SCNC: 3.8 MMOL/L (ref 3.6–5.1)
RBC # BLD AUTO: 3.88 X10(6)UL (ref 3.8–5.1)
RED CELL DISTRIBUTION WIDTH-SD: 39.7 FL (ref 35.1–46.3)
SODIUM SERPL-SCNC: 142 MMOL/L (ref 136–144)
WBC # BLD AUTO: 6.5 X10(3) UL (ref 4–13)

## 2018-03-20 PROCEDURE — 83525 ASSAY OF INSULIN: CPT | Performed by: EMERGENCY MEDICINE

## 2018-03-20 PROCEDURE — 99284 EMERGENCY DEPT VISIT MOD MDM: CPT

## 2018-03-20 PROCEDURE — 84681 ASSAY OF C-PEPTIDE: CPT | Performed by: EMERGENCY MEDICINE

## 2018-03-20 PROCEDURE — 96374 THER/PROPH/DIAG INJ IV PUSH: CPT

## 2018-03-20 PROCEDURE — 85025 COMPLETE CBC W/AUTO DIFF WBC: CPT | Performed by: EMERGENCY MEDICINE

## 2018-03-20 PROCEDURE — 80053 COMPREHEN METABOLIC PANEL: CPT | Performed by: EMERGENCY MEDICINE

## 2018-03-20 RX ORDER — LOSARTAN POTASSIUM 25 MG/1
TABLET ORAL DAILY
COMMUNITY
End: 2018-12-14

## 2018-03-20 RX ORDER — LORAZEPAM 2 MG/ML
0.5 INJECTION INTRAMUSCULAR ONCE
Status: COMPLETED | OUTPATIENT
Start: 2018-03-20 | End: 2018-03-20

## 2018-03-21 VITALS
WEIGHT: 123 LBS | RESPIRATION RATE: 16 BRPM | SYSTOLIC BLOOD PRESSURE: 136 MMHG | OXYGEN SATURATION: 98 % | HEIGHT: 66 IN | DIASTOLIC BLOOD PRESSURE: 78 MMHG | TEMPERATURE: 98 F | HEART RATE: 88 BPM | BODY MASS INDEX: 19.77 KG/M2

## 2018-03-21 LAB — INSULIN: 16.8 MU/L (ref 1.7–31)

## 2018-03-21 RX ORDER — ALPRAZOLAM 0.25 MG/1
0.25 TABLET ORAL 3 TIMES DAILY PRN
Qty: 20 TABLET | Refills: 0 | Status: SHIPPED | OUTPATIENT
Start: 2018-03-21 | End: 2018-03-28

## 2018-03-21 NOTE — ED NOTES
REPORT FROM ITZEL SANTIAGO RN. PT RESTING ON CART WITH  AT BEDSIDE.  PT DENIES NEW OR WORSENING C/O.

## 2018-03-21 NOTE — ED PROVIDER NOTES
Patient Seen in: THE [facility] Emergency Department In [place]    History   Patient presents with: Other    Stated Complaint: SHAKING    HPI    Patient was at the gym today and had a workout followed by a tennis match.   At that point she felt weakness and t Physical Exam  Blood pressure 174/111. Alert and cooperative. Appears generally healthy. Patient does appear anxious  Skin: Warm and dry without cyanosis or pallor. No petechia or purpura  HEENT: Pupils equal round reactive to light. EOMI.   No Etiology of the symptoms unclear at this point.           Disposition and Plan     Clinical Impression:  Transient hypertension  (primary encounter diagnosis)  Episode of shaking  Anxiety    Disposition:  Discharge  3/21/2018  1:29 am    Follow-up:  Yuly Aggarwal,

## 2018-03-21 NOTE — ED INITIAL ASSESSMENT (HPI)
PT STS ONSET 30 MIN PRIOR TO ARRIVAL OF SHAKING AND NAUSEA AFTER WORKING OUT AND PLAYING TENNIS. STS SHAKING HAS DECREASED SINCE ONSET.

## 2018-03-22 LAB — C-PEPTIDE, SERUM OR PLASMA: 3.1 NG/ML

## 2018-03-28 ENCOUNTER — TELEPHONE (OUTPATIENT)
Dept: INTERNAL MEDICINE CLINIC | Facility: CLINIC | Age: 58
End: 2018-03-28

## 2018-03-28 NOTE — TELEPHONE ENCOUNTER
Pt offered appointment with another provider and declined. Appointment made on 4/09 at 4:15 with Dr Gwendlyn Jeans. Will discuss need for Endocrinologist at marlee of visit. Pt currently with no symptoms.

## 2018-03-28 NOTE — TELEPHONE ENCOUNTER
Patient was recently seen at the Emergency room and would like to follow up with Dr. Frank Robles. No available appointments with Dr. Frank Robles. Patient would also like a recommendation for an endocrinologist. Please advise.

## 2018-04-09 ENCOUNTER — HOSPITAL ENCOUNTER (OUTPATIENT)
Dept: GENERAL RADIOLOGY | Age: 58
Discharge: HOME | End: 2018-04-09
Attending: INTERNAL MEDICINE
Payer: COMMERCIAL

## 2018-04-09 ENCOUNTER — OFFICE VISIT (OUTPATIENT)
Dept: INTERNAL MEDICINE CLINIC | Facility: CLINIC | Age: 58
End: 2018-04-09

## 2018-04-09 VITALS
RESPIRATION RATE: 12 BRPM | TEMPERATURE: 99 F | HEIGHT: 66 IN | WEIGHT: 122 LBS | SYSTOLIC BLOOD PRESSURE: 120 MMHG | HEART RATE: 64 BPM | DIASTOLIC BLOOD PRESSURE: 82 MMHG | BODY MASS INDEX: 19.61 KG/M2

## 2018-04-09 DIAGNOSIS — M79.642 LEFT HAND PAIN: ICD-10-CM

## 2018-04-09 DIAGNOSIS — Z00.00 ROUTINE GENERAL MEDICAL EXAMINATION AT A HEALTH CARE FACILITY: Primary | ICD-10-CM

## 2018-04-09 PROBLEM — K63.9 INTESTINAL DISORDER: Status: RESOLVED | Noted: 2017-07-06 | Resolved: 2018-04-09

## 2018-04-09 PROCEDURE — 99396 PREV VISIT EST AGE 40-64: CPT | Performed by: INTERNAL MEDICINE

## 2018-04-09 PROCEDURE — 73130 X-RAY EXAM OF HAND: CPT | Performed by: INTERNAL MEDICINE

## 2018-04-09 RX ORDER — DULOXETIN HYDROCHLORIDE 60 MG/1
CAPSULE, DELAYED RELEASE ORAL
COMMUNITY
Start: 2018-01-11 | End: 2018-12-14 | Stop reason: ALTCHOICE

## 2018-04-09 RX ORDER — LANCETS 28 GAUGE
EACH MISCELLANEOUS
COMMUNITY
Start: 2018-01-09 | End: 2019-05-14 | Stop reason: ALTCHOICE

## 2018-04-09 NOTE — PROGRESS NOTES
Patient presents with: Other: CPX      HPI: The pt presents today for WWE + labs. Of note, she was seen in the [facility] ER a few weeks ago w/ Hypertensive urgency of unknown etiology. She's back to baseline now. She's due for wellness labs.     Review of Relation Age of Onset   • Hypertension Father    • Stroke Father    • Parkinson's Dosease [OTHER] Father    • Alcoholism [OTHER] Father    • gout [OTHER] Father    • Ankylosing spondylitis  [OTHER] Father    • Cancer Mother    • Hypertension Mother    • He %      % 0.3   Glucose      70 - 99 mg/dL 102 (H)   BUN      8 - 20 mg/dL 12   CREATININE      0.55 - 1.02 mg/dL 0.85   GFR, Non-      >=60 77   GFR, -American      >=60 89   CALCIUM      8.3 - 10.3 mg/dL 8.8   ALKALINE PHOSPHATASE

## 2018-04-09 NOTE — PATIENT INSTRUCTIONS
Moira Love,  1. Check home blood pressure #s on a daily basis (ideally 2 measurements per day --> 1st one is upon awakening in the morning; 2nd one is a \"random\" blood pressure in afternoon or evening).   2. Let me know via MyChart the results of these blood p

## 2018-04-10 DIAGNOSIS — I10 ESSENTIAL HYPERTENSION: ICD-10-CM

## 2018-04-10 RX ORDER — LOSARTAN POTASSIUM 25 MG/1
TABLET ORAL
Qty: 90 TABLET | Refills: 0 | Status: SHIPPED | OUTPATIENT
Start: 2018-04-10 | End: 2018-07-05

## 2018-04-12 ENCOUNTER — PATIENT MESSAGE (OUTPATIENT)
Dept: INTERNAL MEDICINE CLINIC | Facility: CLINIC | Age: 58
End: 2018-04-12

## 2018-04-12 DIAGNOSIS — R20.2 PARESTHESIA: ICD-10-CM

## 2018-04-12 DIAGNOSIS — R76.0 ANTIPHOSPHOLIPID ANTIBODY POSITIVE: ICD-10-CM

## 2018-04-12 DIAGNOSIS — F41.9 ANXIETY DISORDER, UNSPECIFIED TYPE: ICD-10-CM

## 2018-04-12 DIAGNOSIS — G62.9 SMALL FIBER NEUROPATHY: Primary | ICD-10-CM

## 2018-04-12 DIAGNOSIS — Z13.820 SCREENING FOR OSTEOPOROSIS: ICD-10-CM

## 2018-04-12 NOTE — TELEPHONE ENCOUNTER
From: Misty Madison  To: Hammad Mota MD  Sent: 4/12/2018 10:32 AM CDT  Subject: Non-Urgent Medical Question    Hello dr Luke Wheeler. Would it be possible to add selenium blood testing to my blood test order?  Also I don’t believe Nga Garcia ever had a bone density t

## 2018-04-16 ENCOUNTER — HOSPITAL ENCOUNTER (OUTPATIENT)
Dept: CT IMAGING | Age: 58
Discharge: HOME | End: 2018-04-16
Attending: INTERNAL MEDICINE

## 2018-04-16 DIAGNOSIS — Z13.9 VISIT FOR SCREENING: ICD-10-CM

## 2018-04-16 DIAGNOSIS — Z13.220 SCREENING FOR CHOLESTEROL LEVEL: ICD-10-CM

## 2018-04-16 NOTE — PROGRESS NOTES
Pt seen at [facility], [facility] for [facility]:  PRELIMINARY SCORE=0  MR=986/76    Cholestec labs as follows:  JM=150  HDL=83  LDL=85  TG=51  GLUCOSE=86; non-fasting    All results and risk factors discussed with patient; all questions and concerns addressed.   Martinez Bee

## 2018-04-24 ENCOUNTER — PATIENT MESSAGE (OUTPATIENT)
Dept: INTERNAL MEDICINE CLINIC | Facility: CLINIC | Age: 58
End: 2018-04-24

## 2018-04-24 DIAGNOSIS — G62.9 SMALL FIBER NEUROPATHY: Primary | ICD-10-CM

## 2018-04-25 NOTE — TELEPHONE ENCOUNTER
From: Misty Madison  To: Hammad Mota MD  Sent: 4/24/2018 6:19 PM CDT  Subject: Non-Urgent Medical Question    Hi dr Luke Wheeler and staff. I have a set of blood tests to take. Can you please add a test for vitamin b 7 biotin?  I want to find out if I should be

## 2018-04-29 ENCOUNTER — PATIENT MESSAGE (OUTPATIENT)
Dept: INTERNAL MEDICINE CLINIC | Facility: CLINIC | Age: 58
End: 2018-04-29

## 2018-04-29 DIAGNOSIS — G62.9 SMALL FIBER NEUROPATHY: Primary | ICD-10-CM

## 2018-04-30 NOTE — TELEPHONE ENCOUNTER
Please ask the lab if they do iodine levels via the blood to test for deficiency. Milagros Goodwin. Brooke Crowell MD  Diplomate, American Board of Internal Medicine  Critical access hospital AND [facility]  [address] [facility],[address], [address], [facility], [address] [place] [address]  T: [phone]; F: [phone]

## 2018-04-30 NOTE — TELEPHONE ENCOUNTER
From: Hever Jones  To: Kimberlee John MD  Sent: 4/29/2018 8:07 AM CDT  Subject: Non-Urgent Medical Question    Hi dr Gwendlyn Jeans and staff. Sorry to keep bothering you but I gave one more test request. I'd like to test for iodine deficiency . ..  Can that be add

## 2018-06-13 ENCOUNTER — TELEPHONE (OUTPATIENT)
Dept: INTERNAL MEDICINE CLINIC | Facility: CLINIC | Age: 58
End: 2018-06-13

## 2018-06-13 ENCOUNTER — OFFICE VISIT (OUTPATIENT)
Dept: FAMILY MEDICINE CLINIC | Facility: CLINIC | Age: 58
End: 2018-06-13

## 2018-06-13 ENCOUNTER — HOSPITAL ENCOUNTER (OUTPATIENT)
Dept: BONE DENSITY | Age: 58
Discharge: HOME | End: 2018-06-13
Attending: INTERNAL MEDICINE
Payer: COMMERCIAL

## 2018-06-13 DIAGNOSIS — Z01.84 IMMUNITY STATUS TESTING: Primary | ICD-10-CM

## 2018-06-13 DIAGNOSIS — Z13.820 SCREENING FOR OSTEOPOROSIS: ICD-10-CM

## 2018-06-13 DIAGNOSIS — Z23 ENCOUNTER FOR IMMUNIZATION: Primary | ICD-10-CM

## 2018-06-13 PROCEDURE — 77080 DXA BONE DENSITY AXIAL: CPT | Performed by: INTERNAL MEDICINE

## 2018-06-13 PROCEDURE — 90715 TDAP VACCINE 7 YRS/> IM: CPT | Performed by: PHYSICIAN ASSISTANT

## 2018-06-13 PROCEDURE — 90471 IMMUNIZATION ADMIN: CPT | Performed by: PHYSICIAN ASSISTANT

## 2018-06-13 NOTE — TELEPHONE ENCOUNTER
Pt was called back and pt informed wants to check if has immunity to MMR & varicella as has heard \"its running wild, and does not want to get sick\".  Informed pt insurance might not cover this test unless indicated, pt then inquire would be willing to be

## 2018-07-05 DIAGNOSIS — I10 ESSENTIAL HYPERTENSION: ICD-10-CM

## 2018-07-06 RX ORDER — LOSARTAN POTASSIUM 25 MG/1
25 TABLET ORAL EVERY MORNING
Qty: 90 TABLET | Refills: 1 | Status: SHIPPED | OUTPATIENT
Start: 2018-07-06 | End: 2019-02-10

## 2018-07-06 NOTE — TELEPHONE ENCOUNTER
Refill requested: Losartan 25 mg     Passed protocol      Last refill: 4/10/18 Losartan 25 mg   Relevant Labs: NA   BP Readings from Last 3 Encounters:  04/09/18 : 120/82  03/21/18 : 136/78  01/18/18 : 130/82      Last OV / RTC advised: 4/9/18 Dr Amina Meyers RTC

## 2018-07-24 ENCOUNTER — APPOINTMENT (OUTPATIENT)
Dept: LAB | Age: 58
End: 2018-07-24
Attending: INTERNAL MEDICINE
Payer: COMMERCIAL

## 2018-07-24 DIAGNOSIS — Z00.00 ROUTINE GENERAL MEDICAL EXAMINATION AT A HEALTH CARE FACILITY: ICD-10-CM

## 2018-07-24 LAB
BILIRUB UR QL STRIP.AUTO: NEGATIVE
CHOLEST SMN-MCNC: 200 MG/DL (ref ?–200)
CLARITY UR REFRACT.AUTO: CLEAR
COLOR UR AUTO: YELLOW
EST. AVERAGE GLUCOSE BLD GHB EST-MCNC: 105 MG/DL (ref 68–126)
GLUCOSE UR STRIP.AUTO-MCNC: NEGATIVE MG/DL
HBA1C MFR BLD HPLC: 5.3 % (ref ?–5.7)
HDLC SERPL-MCNC: 93 MG/DL (ref 40–59)
KETONES UR STRIP.AUTO-MCNC: NEGATIVE MG/DL
LDLC SERPL CALC-MCNC: 93 MG/DL (ref ?–100)
LEUKOCYTE ESTERASE UR QL STRIP.AUTO: NEGATIVE
NITRITE UR QL STRIP.AUTO: NEGATIVE
NONHDLC SERPL-MCNC: 107 MG/DL (ref ?–130)
PH UR STRIP.AUTO: 7 [PH] (ref 4.5–8)
PROT UR STRIP.AUTO-MCNC: NEGATIVE MG/DL
RBC UR QL AUTO: NEGATIVE
SP GR UR STRIP.AUTO: 1.01 (ref 1–1.03)
TRIGL SERPL-MCNC: 72 MG/DL (ref 30–149)
TSI SER-ACNC: 1.97 MIU/ML (ref 0.35–5.5)
UROBILINOGEN UR STRIP.AUTO-MCNC: <2 MG/DL
VIT D+METAB SERPL-MCNC: 38.6 NG/ML (ref 30–100)
VLDLC SERPL CALC-MCNC: 14 MG/DL (ref 0–30)

## 2018-07-24 PROCEDURE — 84443 ASSAY THYROID STIM HORMONE: CPT | Performed by: INTERNAL MEDICINE

## 2018-07-24 PROCEDURE — 80061 LIPID PANEL: CPT | Performed by: INTERNAL MEDICINE

## 2018-07-24 PROCEDURE — 36415 COLL VENOUS BLD VENIPUNCTURE: CPT | Performed by: INTERNAL MEDICINE

## 2018-07-24 PROCEDURE — 81003 URINALYSIS AUTO W/O SCOPE: CPT | Performed by: INTERNAL MEDICINE

## 2018-07-24 PROCEDURE — 82306 VITAMIN D 25 HYDROXY: CPT | Performed by: INTERNAL MEDICINE

## 2018-07-24 PROCEDURE — 83036 HEMOGLOBIN GLYCOSYLATED A1C: CPT | Performed by: INTERNAL MEDICINE

## 2018-09-07 ENCOUNTER — TELEPHONE (OUTPATIENT)
Dept: INTERNAL MEDICINE CLINIC | Facility: CLINIC | Age: 58
End: 2018-09-07

## 2018-09-07 DIAGNOSIS — Z86.19 H/O COLD SORES: Primary | ICD-10-CM

## 2018-09-07 NOTE — TELEPHONE ENCOUNTER
Patient called stating she feels a cold sore coming on.  Patient previously had prescriptions sent to pharmacy for this issue and is wondering if Dr. Frank Robles would be able to send rx

## 2018-09-08 RX ORDER — ACYCLOVIR 50 MG/G
OINTMENT TOPICAL
Qty: 5 G | Refills: 0 | Status: SHIPPED | OUTPATIENT
Start: 2018-09-08 | End: 2018-12-14 | Stop reason: ALTCHOICE

## 2018-09-08 NOTE — TELEPHONE ENCOUNTER
Med sent to pharmacy. Erich Gonzalesal. Tali Conn MD  Diplomate, American Board of Internal Medicine  [facility]  [address] [facility],[address], [address], [facility], [address] [place] [address]  T: [phone]; F: Michela 5

## 2018-09-27 ENCOUNTER — HOSPITAL ENCOUNTER (OUTPATIENT)
Age: 58
Discharge: HOME | End: 2018-09-27
Payer: COMMERCIAL

## 2018-09-27 ENCOUNTER — APPOINTMENT (OUTPATIENT)
Dept: CT IMAGING | Age: 58
End: 2018-09-27
Attending: PHYSICIAN ASSISTANT
Payer: COMMERCIAL

## 2018-09-27 VITALS
SYSTOLIC BLOOD PRESSURE: 127 MMHG | OXYGEN SATURATION: 98 % | TEMPERATURE: 98 F | RESPIRATION RATE: 16 BRPM | DIASTOLIC BLOOD PRESSURE: 84 MMHG | HEART RATE: 57 BPM

## 2018-09-27 DIAGNOSIS — R31.9 HEMATURIA, UNSPECIFIED TYPE: ICD-10-CM

## 2018-09-27 DIAGNOSIS — R10.9 ACUTE RIGHT FLANK PAIN: Primary | ICD-10-CM

## 2018-09-27 DIAGNOSIS — K59.00 CONSTIPATION, UNSPECIFIED CONSTIPATION TYPE: ICD-10-CM

## 2018-09-27 DIAGNOSIS — R93.89 ABNORMAL FINDING ON CT SCAN: ICD-10-CM

## 2018-09-27 DIAGNOSIS — M16.0 OSTEOARTHRITIS OF BOTH HIPS, UNSPECIFIED OSTEOARTHRITIS TYPE: ICD-10-CM

## 2018-09-27 PROCEDURE — 74176 CT ABD & PELVIS W/O CONTRAST: CPT | Performed by: PHYSICIAN ASSISTANT

## 2018-09-27 PROCEDURE — 87186 SC STD MICRODIL/AGAR DIL: CPT | Performed by: PHYSICIAN ASSISTANT

## 2018-09-27 PROCEDURE — 87086 URINE CULTURE/COLONY COUNT: CPT | Performed by: PHYSICIAN ASSISTANT

## 2018-09-27 PROCEDURE — 85025 COMPLETE CBC W/AUTO DIFF WBC: CPT | Performed by: PHYSICIAN ASSISTANT

## 2018-09-27 PROCEDURE — 81025 URINE PREGNANCY TEST: CPT | Performed by: PHYSICIAN ASSISTANT

## 2018-09-27 PROCEDURE — 99214 OFFICE O/P EST MOD 30 MIN: CPT

## 2018-09-27 PROCEDURE — 96374 THER/PROPH/DIAG INJ IV PUSH: CPT

## 2018-09-27 PROCEDURE — 87077 CULTURE AEROBIC IDENTIFY: CPT | Performed by: PHYSICIAN ASSISTANT

## 2018-09-27 PROCEDURE — 80047 BASIC METABLC PNL IONIZED CA: CPT

## 2018-09-27 PROCEDURE — 96361 HYDRATE IV INFUSION ADD-ON: CPT

## 2018-09-27 PROCEDURE — 81002 URINALYSIS NONAUTO W/O SCOPE: CPT | Performed by: PHYSICIAN ASSISTANT

## 2018-09-27 PROCEDURE — 99215 OFFICE O/P EST HI 40 MIN: CPT

## 2018-09-27 RX ORDER — SODIUM CHLORIDE 9 MG/ML
1000 INJECTION, SOLUTION INTRAVENOUS ONCE
Status: COMPLETED | OUTPATIENT
Start: 2018-09-27 | End: 2018-09-27

## 2018-09-27 RX ORDER — KETOROLAC TROMETHAMINE 30 MG/ML
30 INJECTION, SOLUTION INTRAMUSCULAR; INTRAVENOUS ONCE
Status: COMPLETED | OUTPATIENT
Start: 2018-09-27 | End: 2018-09-27

## 2018-09-27 RX ORDER — CEPHALEXIN 500 MG/1
500 CAPSULE ORAL 3 TIMES DAILY
Qty: 30 CAPSULE | Refills: 0 | Status: SHIPPED | OUTPATIENT
Start: 2018-09-27 | End: 2018-10-07

## 2018-09-27 NOTE — ED INITIAL ASSESSMENT (HPI)
Complains of right sided back/flank pain for the last three days. States also feels hypogastric area pressure. Denies pain or frequency of urination.

## 2018-09-27 NOTE — ED PROVIDER NOTES
Patient Seen in: [facility]    History   Patient presents with:  Back Pain (musculoskeletal)    Stated Complaint: back pain / pelvic pain    HPI    72-year-old female here with complaint of right flank pain radiating around to t Systems    Positive for stated complaint: back pain / pelvic pain  Other systems are as noted in HPI. Constitutional and vital signs reviewed. All other systems reviewed and negative except as noted above.     Physical Exam     ED Triage Vitals [09/27 POCT ISTAT CHEM8 CARTRIDGE   URINE CULTURE, ROUTINE     Ct Abdomen+pelvis Kidneystone 2d Rndr(no Iv,no Oral)(cpt=74176)    Result Date: 9/27/2018  PROCEDURE:  CT ABDOMEN/PELVIS KIDNEYSTONE 2D RNDR (NO IV,NO ORAL) (CPT=74176)  COMPARISON:  None.   INDICATI the L5 segment. PELVIC ORGANS:  Normal for age. LUNG BASES:  No visible pulmonary or pleural disease. CONCLUSION:  1. No evidence of renal stones or hydronephrosis.  2. There is a cyst versus hemangioma within the lateral segment of the left lobe of Medications Prescribed:  Discharge Medication List as of 9/27/2018  2:40 PM    START taking these medications    cephALEXin (KEFLEX) 500 MG Oral Cap  Take 1 capsule (500 mg total) by mouth 3 (three) times daily for 10 days. , Normal, Disp-30 capsu

## 2018-10-22 ENCOUNTER — HOSPITAL ENCOUNTER (OUTPATIENT)
Dept: MRI IMAGING | Age: 58
End: 2018-10-22
Attending: SURGERY
Payer: COMMERCIAL

## 2018-10-22 ENCOUNTER — HOSPITAL ENCOUNTER (OUTPATIENT)
Dept: GENERAL RADIOLOGY | Age: 58
Discharge: HOME | End: 2018-10-22
Attending: SURGERY
Payer: COMMERCIAL

## 2018-10-22 DIAGNOSIS — S73.199A LABRAL TEAR OF HIP JOINT: ICD-10-CM

## 2018-10-25 ENCOUNTER — TELEPHONE (OUTPATIENT)
Dept: INTERNAL MEDICINE CLINIC | Facility: CLINIC | Age: 58
End: 2018-10-25

## 2018-10-25 NOTE — TELEPHONE ENCOUNTER
Called pt back to get more information. Pt stated had \"2 surgeries yesterday hysterectomy and appendectomy\" at [facility], pt stated was d/c w/o pain medication and now has pain. Pt requested for pain medication to be prescribed by pcp.  Pt \"willi

## 2018-10-25 NOTE — TELEPHONE ENCOUNTER
Patient stated that she had a hysterectomy yesterday and was given some pain medication. She stated that it's not strong enough. She called the surgeons office and he declined to give her anything different.  She wants to know if Dr. Gwendlyn Jeans can prescribe nereida

## 2018-11-10 ENCOUNTER — HOSPITAL ENCOUNTER (OUTPATIENT)
Dept: MRI IMAGING | Age: 58
Discharge: HOME | End: 2018-11-10
Attending: ORTHOPAEDIC SURGERY
Payer: COMMERCIAL

## 2018-11-10 DIAGNOSIS — S73.199A LABRAL TEAR OF HIP JOINT: ICD-10-CM

## 2018-11-10 PROCEDURE — 73721 MRI JNT OF LWR EXTRE W/O DYE: CPT | Performed by: ORTHOPAEDIC SURGERY

## 2018-11-16 ENCOUNTER — TELEPHONE (OUTPATIENT)
Dept: INTERNAL MEDICINE CLINIC | Facility: CLINIC | Age: 58
End: 2018-11-16

## 2018-11-16 NOTE — TELEPHONE ENCOUNTER
Patient had MRI completed on 11/10 ordered by Dr. Dennis Woods who is out of [facility]. Results in [address].  Patient was advised to call our office in order to have imaging results released to [facility]

## 2018-11-18 NOTE — TELEPHONE ENCOUNTER
Done.    Kearney kaylee Verdin MD  Diplomate, American Board of Internal Medicine  [employer] [facility]  [address] [facility],[address], [address], [facility], [address] [place] [address]  T: [phone]; F: Michela 5

## 2018-12-14 ENCOUNTER — HOSPITAL ENCOUNTER (OUTPATIENT)
Age: 58
Discharge: HOME | End: 2018-12-14
Payer: COMMERCIAL

## 2018-12-14 VITALS
RESPIRATION RATE: 18 BRPM | SYSTOLIC BLOOD PRESSURE: 106 MMHG | HEART RATE: 78 BPM | OXYGEN SATURATION: 98 % | TEMPERATURE: 98 F | DIASTOLIC BLOOD PRESSURE: 73 MMHG

## 2018-12-14 DIAGNOSIS — H92.01 RIGHT EAR PAIN: Primary | ICD-10-CM

## 2018-12-14 PROCEDURE — 99213 OFFICE O/P EST LOW 20 MIN: CPT

## 2018-12-14 RX ORDER — CIPROFLOXACIN AND DEXAMETHASONE 3; 1 MG/ML; MG/ML
4 SUSPENSION/ DROPS AURICULAR (OTIC) 2 TIMES DAILY
Qty: 1 BOTTLE | Refills: 0 | Status: SHIPPED | OUTPATIENT
Start: 2018-12-14 | End: 2018-12-21

## 2018-12-14 NOTE — ED PROVIDER NOTES
Patient Seen in: [facility]    History   Patient presents with:  Ear Problem Pain (neurosensory)    Stated Complaint: 2 days ear pain    61-year-old female presents today with complaints of right ear pain.   Recently had facelift other systems reviewed and negative except as noted above.     Physical Exam     ED Triage Vitals [12/14/18 1141]   /73   Pulse 78   Resp 18   Temp 98.2 °F (36.8 °C)   Temp src Oral   SpO2 98 %   O2 Device None (Room air)       Current:/73   Pul am    Follow-up:  Yesika Hernandez MD  [facility] [address] [id number]    In 1 week  As needed        Medications Prescribed:  Current Discharge Medication List    START taking these medications    ciprofloxacin-dexamethasone (CIPROD

## 2018-12-14 NOTE — ED INITIAL ASSESSMENT (HPI)
Complains of right ear sharp pain for the last three days. S/P Face Lift nine days ago. Still with some sutures in place on bilateral temple, bilateral ear and behind ears. Incision site appears dry.

## 2019-02-10 DIAGNOSIS — I10 ESSENTIAL HYPERTENSION: ICD-10-CM

## 2019-02-11 RX ORDER — LOSARTAN POTASSIUM 25 MG/1
TABLET ORAL
Qty: 90 TABLET | Refills: 0 | Status: SHIPPED | OUTPATIENT
Start: 2019-02-11 | End: 2019-05-18

## 2019-02-11 NOTE — TELEPHONE ENCOUNTER
Losartan 25 mg 1 tab daily filled 7-6-18 90 with 1 refill     LOV 4-9-18     . RTC 1 year or PRN.      Next apt 4-23-19     Labs 7-24-18

## 2019-03-07 ENCOUNTER — PATIENT MESSAGE (OUTPATIENT)
Dept: INTERNAL MEDICINE CLINIC | Facility: CLINIC | Age: 59
End: 2019-03-07

## 2019-03-07 DIAGNOSIS — R79.89 ABNORMAL CBC: Primary | ICD-10-CM

## 2019-03-09 NOTE — TELEPHONE ENCOUNTER
From: Genesis Blackman  To: Pascual Kurtz MD  Sent: 3/7/2019 7:55 AM CST  Subject: Test Results Question    Hello Dr. Pj Woodard: I was reviewing some blood tests that my gynecologist ordered back in Oct 2018. Some results have me concerned: See below.  Should i r

## 2019-03-09 NOTE — PROGRESS NOTES
Repeat CBC w/ diff ordered. Hilario Sidhu. Chioma Hay MD  Diplomate, American Board of Internal Medicine  [address] [address] [address]  [address] [place] [address],4Th Floor, [address], [facility], [address]  T: [phone]; F: Michela 5

## 2019-03-19 ENCOUNTER — PATIENT MESSAGE (OUTPATIENT)
Dept: INTERNAL MEDICINE CLINIC | Facility: CLINIC | Age: 59
End: 2019-03-19

## 2019-03-20 ENCOUNTER — PATIENT MESSAGE (OUTPATIENT)
Dept: INTERNAL MEDICINE CLINIC | Facility: CLINIC | Age: 59
End: 2019-03-20

## 2019-03-20 NOTE — TELEPHONE ENCOUNTER
From: Min Serna  To: Naomi Baldwin MD  Sent: 3/20/2019 12:08 PM CDT  Subject: Referral Request    Hello! I'm looking to switch ENT dr to one with [facility].  Can you provide a referral for someone with good bedside manner :) [address] location [address]

## 2019-03-21 NOTE — PROGRESS NOTES
Please recommend Dr. Octavia Anne from [facility] ENT. Lesley Plasencia. Raymond Clifford MD  Diplomate, American Board of Internal Medicine  [address] [address] [address]  [address] [place] [address],4Th Floor, [address], [facility], [address]  T: [phone]; F: [address]

## 2019-03-23 ENCOUNTER — LAB ENCOUNTER (OUTPATIENT)
Dept: LAB | Age: 59
End: 2019-03-23
Attending: INTERNAL MEDICINE
Payer: COMMERCIAL

## 2019-03-23 DIAGNOSIS — R79.89 ABNORMAL CBC: ICD-10-CM

## 2019-03-23 DIAGNOSIS — Z01.84 IMMUNITY STATUS TESTING: ICD-10-CM

## 2019-03-23 LAB
BASOPHILS # BLD AUTO: 0.04 X10(3) UL (ref 0–0.2)
BASOPHILS NFR BLD AUTO: 0.9 %
DEPRECATED RDW RBC AUTO: 42.5 FL (ref 35.1–46.3)
EOSINOPHIL # BLD AUTO: 0.08 X10(3) UL (ref 0–0.7)
EOSINOPHIL NFR BLD AUTO: 1.7 %
ERYTHROCYTE [DISTWIDTH] IN BLOOD BY AUTOMATED COUNT: 11.7 % (ref 11–15)
HCT VFR BLD AUTO: 39.7 % (ref 35–48)
HGB BLD-MCNC: 13.4 G/DL (ref 12–16)
IMM GRANULOCYTES # BLD AUTO: 0.01 X10(3) UL (ref 0–1)
IMM GRANULOCYTES NFR BLD: 0.2 %
LYMPHOCYTES # BLD AUTO: 1.23 X10(3) UL (ref 1–4)
LYMPHOCYTES NFR BLD AUTO: 26.6 %
MCH RBC QN AUTO: 33.6 PG (ref 26–34)
MCHC RBC AUTO-ENTMCNC: 33.8 G/DL (ref 31–37)
MCV RBC AUTO: 99.5 FL (ref 80–100)
MONOCYTES # BLD AUTO: 0.49 X10(3) UL (ref 0.1–1)
MONOCYTES NFR BLD AUTO: 10.6 %
NEUTROPHILS # BLD AUTO: 2.77 X10 (3) UL (ref 1.5–7.7)
NEUTROPHILS # BLD AUTO: 2.77 X10(3) UL (ref 1.5–7.7)
NEUTROPHILS NFR BLD AUTO: 60 %
PLATELET # BLD AUTO: 233 10(3)UL (ref 150–450)
RBC # BLD AUTO: 3.99 X10(6)UL (ref 3.8–5.3)
RUBV IGG SER QL: POSITIVE
RUBV IGG SER-ACNC: 429.2 IU/ML (ref 10–?)
WBC # BLD AUTO: 4.6 X10(3) UL (ref 4–11)

## 2019-03-23 PROCEDURE — 86762 RUBELLA ANTIBODY: CPT | Performed by: INTERNAL MEDICINE

## 2019-03-23 PROCEDURE — 86735 MUMPS ANTIBODY: CPT | Performed by: INTERNAL MEDICINE

## 2019-03-23 PROCEDURE — 36415 COLL VENOUS BLD VENIPUNCTURE: CPT | Performed by: INTERNAL MEDICINE

## 2019-03-23 PROCEDURE — 85025 COMPLETE CBC W/AUTO DIFF WBC: CPT | Performed by: INTERNAL MEDICINE

## 2019-03-23 PROCEDURE — 86765 RUBEOLA ANTIBODY: CPT | Performed by: INTERNAL MEDICINE

## 2019-03-26 LAB
MEV IGG SER-ACNC: >300 AU/ML (ref 30–?)
MUV IGG SER IA-ACNC: 248 AU/ML (ref 11–?)

## 2019-04-18 ENCOUNTER — TELEPHONE (OUTPATIENT)
Dept: NEUROLOGY | Facility: CLINIC | Age: 59
End: 2019-04-18

## 2019-04-18 NOTE — TELEPHONE ENCOUNTER
Left detailed message that EMG results would be pasted into a [facility] message and sent to her. To call back if she has any additional questions, contact info and office hours provided. Sent results as MyChart message as noted above.

## 2019-05-05 ENCOUNTER — PATIENT MESSAGE (OUTPATIENT)
Dept: INTERNAL MEDICINE CLINIC | Facility: CLINIC | Age: 59
End: 2019-05-05

## 2019-05-06 NOTE — PROGRESS NOTES
Please provide daughter with all options. I'm not familiar with this process but I'm sure there are online tools. Jefry Garcia. Leslie Colin MD  Diplomate, American Board of Internal Medicine  [facility]  [address]  [address] [place] [address],4Th Floor, [address], [facility], [address] [place] [address]

## 2019-05-06 NOTE — TELEPHONE ENCOUNTER
From: Qiana Stacy  To: Tatiana Willis MD  Sent: 5/5/2019 8:33 AM CDT  Subject: Non-Urgent Medical Question    Nancy, my daughter Enrico Rodríguez is dr Jose Juan Mae patient.  She’s at [facility] right now and coming home on sat may 18 for one week before going aw

## 2019-05-14 ENCOUNTER — OFFICE VISIT (OUTPATIENT)
Dept: INTERNAL MEDICINE CLINIC | Facility: CLINIC | Age: 59
End: 2019-05-14
Payer: COMMERCIAL

## 2019-05-14 VITALS
BODY MASS INDEX: 20.49 KG/M2 | WEIGHT: 127.5 LBS | RESPIRATION RATE: 16 BRPM | TEMPERATURE: 98 F | SYSTOLIC BLOOD PRESSURE: 114 MMHG | HEART RATE: 68 BPM | DIASTOLIC BLOOD PRESSURE: 78 MMHG | HEIGHT: 66 IN

## 2019-05-14 DIAGNOSIS — Z12.31 ENCOUNTER FOR SCREENING MAMMOGRAM FOR MALIGNANT NEOPLASM OF BREAST: ICD-10-CM

## 2019-05-14 DIAGNOSIS — Z00.00 ROUTINE GENERAL MEDICAL EXAMINATION AT A HEALTH CARE FACILITY: Primary | ICD-10-CM

## 2019-05-14 PROCEDURE — 99396 PREV VISIT EST AGE 40-64: CPT | Performed by: INTERNAL MEDICINE

## 2019-05-14 RX ORDER — ZOLPIDEM TARTRATE 5 MG/1
TABLET ORAL
COMMUNITY
Start: 2019-01-04 | End: 2020-10-09

## 2019-05-14 RX ORDER — ESTRADIOL 0.75 MG/1.25G
GEL, METERED TOPICAL
Refills: 0 | COMMUNITY
Start: 2019-04-22 | End: 2019-12-19 | Stop reason: ALTCHOICE

## 2019-05-14 RX ORDER — LEVOTHYROXINE, LIOTHYRONINE 19; 4.5 UG/1; UG/1
TABLET ORAL DAILY
Refills: 4 | COMMUNITY
Start: 2019-04-04 | End: 2021-09-16 | Stop reason: ALTCHOICE

## 2019-05-14 NOTE — PROGRESS NOTES
Patient presents with:  CPX      HPI: Rachel Clarence presents today for WWE. Doing well. Due for wellness labs.     Review of Systems   Skin:        + pulsating vessel in the R biceps region over the past few years, but it's grown in size and can cause some tingli • Other (Parkinson's Dosease) Father    • Other (Alcoholism) Father    • Other (gout) Father    • Other (Ankylosing spondylitis ) Father    • Cancer Mother    • Hypertension Mother    • Heart Disorder Mother         Heart Attack Hx   • Hypertension Siste which were then answered to the best of my ability. Sonido Dalal. Araceli Peralta MD  Diplomate, American Board of Internal Medicine  [address] [address] [address]  [address] [place] [address],4Th Floor, [address], [facility], [address] [place] [address]  T: [phone]; F: [phone]     Orders Placed This En

## 2019-05-18 DIAGNOSIS — I10 ESSENTIAL HYPERTENSION: ICD-10-CM

## 2019-05-20 RX ORDER — LOSARTAN POTASSIUM 25 MG/1
TABLET ORAL
Qty: 90 TABLET | Refills: 0 | Status: SHIPPED | OUTPATIENT
Start: 2019-05-20 | End: 2019-08-18

## 2019-05-20 NOTE — TELEPHONE ENCOUNTER
Failed protocol     Last refill:  2/11/2019 #90 NR    LOV:   5/14/2019 Dr Frank Robles RTC 1 year    No FOV scheduled

## 2019-06-04 ENCOUNTER — PATIENT MESSAGE (OUTPATIENT)
Dept: INTERNAL MEDICINE CLINIC | Facility: CLINIC | Age: 59
End: 2019-06-04

## 2019-06-04 NOTE — TELEPHONE ENCOUNTER
Last OV on 5/14 pt given vascular recommendation. Name not mentioned in progress note. Please provide.

## 2019-06-04 NOTE — TELEPHONE ENCOUNTER
From: Oracio Carballo  To: Ruel Silva MD  Sent: 6/4/2019 12:13 PM CDT  Subject: Referral Request    Hello - i sent a message on 6/1 asking for a referral... was just looking to see if you had a chance to look at it.  Need the vascular doctors name and num

## 2019-06-05 NOTE — PROGRESS NOTES
Dr. Prince Salas from [facility]. Milagros Goodwin. Brooke Crowell MD  Diplomate, American Board of Internal Medicine  [address] [facility]  [address]  [facility], [address], [facility], [address]  T: [phone]; F: [address]

## 2019-06-22 ENCOUNTER — HOSPITAL ENCOUNTER (OUTPATIENT)
Dept: MAMMOGRAPHY | Age: 59
Discharge: HOME | End: 2019-06-22
Attending: INTERNAL MEDICINE
Payer: COMMERCIAL

## 2019-06-22 DIAGNOSIS — Z12.31 ENCOUNTER FOR SCREENING MAMMOGRAM FOR MALIGNANT NEOPLASM OF BREAST: ICD-10-CM

## 2019-06-22 PROCEDURE — 77067 SCR MAMMO BI INCL CAD: CPT | Performed by: INTERNAL MEDICINE

## 2019-08-13 NOTE — TELEPHONE ENCOUNTER
Dr. Sakshi Garcia from [address] [place] [address]. Jefry Garcia. Leslie Colin MD  Diplomate, American Board of Internal Medicine  [facility]  [address]  [address] [facility],4Th Floor, [address], [facility], [address] [place] [address]  T: [phone]; F: [address] Patient notified.   Patient voiced understanding  of information given and denies any questions.

## 2019-08-18 DIAGNOSIS — I10 ESSENTIAL HYPERTENSION: ICD-10-CM

## 2019-08-20 RX ORDER — LOSARTAN POTASSIUM 25 MG/1
TABLET ORAL
Qty: 90 TABLET | Refills: 3 | Status: SHIPPED | OUTPATIENT
Start: 2019-08-20 | End: 2020-08-27

## 2019-08-20 NOTE — TELEPHONE ENCOUNTER
Losartan 25 mg 1 tab daily filed 5-20-19 90 with 0 refills     LOV 5-14-19   RTC 1 year or PRN  No upcoming apt on file   Labs 3-23-19

## 2019-08-21 NOTE — TELEPHONE ENCOUNTER
Diazepam 5 mg 1 tab daily prn filled 4-19-19 10 tab with 0 refills    LOV 5-14-19    RTC 1 year or PRN  No upcoming apt on file   Labs 3-23-19

## 2019-08-22 RX ORDER — DIAZEPAM 5 MG/1
TABLET ORAL
Qty: 10 TABLET | Refills: 0 | Status: SHIPPED | OUTPATIENT
Start: 2019-08-22 | End: 2019-10-29

## 2019-09-16 ENCOUNTER — OFFICE VISIT (OUTPATIENT)
Dept: PODIATRY CLINIC | Facility: CLINIC | Age: 59
End: 2019-09-16
Payer: COMMERCIAL

## 2019-09-16 DIAGNOSIS — B35.1 ONYCHOMYCOSIS: Primary | ICD-10-CM

## 2019-09-16 PROCEDURE — 99202 OFFICE O/P NEW SF 15 MIN: CPT | Performed by: PODIATRIST

## 2019-09-16 NOTE — PROGRESS NOTES
Francisco Javier Mims is a 66year old female. Patient presents with:  New Patient: Right hallux possible fungus. Patient states that she normally wears toenail polish so just noticed it about 2 weeks ago.  Patient denies any pain or swelling        HPI:     Pres Disorder Mother         Heart Attack Hx   • Hypertension Sister    • Hypertension Brother    • Stroke Paternal Grandmother    • Other (Ruptured cerebral aneurysm) Paternal Grandfather       Social History    Socioeconomic History      Marital status: Marri for this visit:    Onychomycosis  -     SURGICAL PATHOLOGY TISSUE; Future        Plan: The area was debrided sample of the nail was sent for PAS stain we will contact her with results and determine treatment options at that time.     The patient indicates u

## 2019-09-17 ENCOUNTER — HOSPITAL ENCOUNTER (OUTPATIENT)
Age: 59
Discharge: HOME | End: 2019-09-17
Payer: COMMERCIAL

## 2019-09-17 VITALS
HEART RATE: 66 BPM | TEMPERATURE: 99 F | OXYGEN SATURATION: 98 % | DIASTOLIC BLOOD PRESSURE: 89 MMHG | SYSTOLIC BLOOD PRESSURE: 132 MMHG | RESPIRATION RATE: 16 BRPM

## 2019-09-17 DIAGNOSIS — L70.0 OPEN COMEDONE: Primary | ICD-10-CM

## 2019-09-17 PROCEDURE — 99213 OFFICE O/P EST LOW 20 MIN: CPT

## 2019-09-17 PROCEDURE — 99212 OFFICE O/P EST SF 10 MIN: CPT

## 2019-09-17 NOTE — ED PROVIDER NOTES
Patient Seen in: [facility]      History   Patient presents with:  Bump    Stated Complaint: bump on forehead     BETH Johnson is a 82-year-old female presents today for evaluation of a bump to the left side of her forehead.   Sh (Temporal)   Resp 16   LMP 06/22/2016 (Approximate)   SpO2 98%         Physical Exam  Nursing note reviewed. Vital signs reviewed. Constitutional: Oriented to person, place, and time.  Patient appears well-developed and well-nourished, non-toxic and in no Prescribed:  Discharge Medication List as of 9/17/2019  5:54 PM

## 2019-09-19 ENCOUNTER — TELEPHONE (OUTPATIENT)
Dept: PODIATRY CLINIC | Facility: CLINIC | Age: 59
End: 2019-09-19

## 2019-09-19 NOTE — TELEPHONE ENCOUNTER
Contacted and advised that there was no fungus noted on her nail sample may be slight trauma at this time we will just keep it under observation and see her back as needed

## 2019-10-30 NOTE — TELEPHONE ENCOUNTER
Failed protocol     Last refill:  8/22/2019 Diazepam 5 mg #10 NR    LOV:   5/14/2019 Dr Bolivar Ross RTC 1 year   No FOV scheduled

## 2019-11-01 RX ORDER — DIAZEPAM 5 MG/1
TABLET ORAL
Qty: 10 TABLET | Refills: 0 | Status: SHIPPED | OUTPATIENT
Start: 2019-11-01 | End: 2019-12-13

## 2019-12-12 ENCOUNTER — PATIENT MESSAGE (OUTPATIENT)
Dept: INTERNAL MEDICINE CLINIC | Facility: CLINIC | Age: 59
End: 2019-12-12

## 2019-12-12 RX ORDER — DIAZEPAM 5 MG/1
TABLET ORAL
Qty: 10 TABLET | Refills: 0 | Status: CANCELLED | OUTPATIENT
Start: 2019-12-12

## 2019-12-13 ENCOUNTER — TELEPHONE (OUTPATIENT)
Dept: INTERNAL MEDICINE CLINIC | Facility: CLINIC | Age: 59
End: 2019-12-13

## 2019-12-13 RX ORDER — DIAZEPAM 5 MG/1
5 TABLET ORAL DAILY PRN
Qty: 10 TABLET | Refills: 0 | Status: SHIPPED | OUTPATIENT
Start: 2019-12-13 | End: 2020-02-10

## 2019-12-13 NOTE — TELEPHONE ENCOUNTER
Last refill:  11/1/2019 Diazepam 5 mg #10 NR    LOV:   5/14/2019 Dr Vanita Heart RTC 1 year or PRN  No FOV scheduled

## 2019-12-13 NOTE — TELEPHONE ENCOUNTER
From: Edison Wolff  To: Tobi Urrutia MD  Sent: 12/12/2019 4:44 PM CST  Subject: Prescription Question    I’m traveling out of town sat and get very anxious traveling. Can you pls renew my diazepam script ?  I also submitted it via request on [facility]    Th

## 2019-12-13 NOTE — TELEPHONE ENCOUNTER
Patient calling to request 10 panel STD order be entered for her.   Please call to verify/confirm this can be ordered

## 2019-12-13 NOTE — TELEPHONE ENCOUNTER
Future Appointments   Date Time Provider Reagan Guadalupe   12/19/2019 12:15 PM Stevo Montero MD [facility] [place]

## 2019-12-19 ENCOUNTER — OFFICE VISIT (OUTPATIENT)
Dept: INTERNAL MEDICINE CLINIC | Facility: CLINIC | Age: 59
End: 2019-12-19
Payer: COMMERCIAL

## 2019-12-19 VITALS
SYSTOLIC BLOOD PRESSURE: 110 MMHG | HEART RATE: 68 BPM | HEIGHT: 66 IN | WEIGHT: 127.5 LBS | DIASTOLIC BLOOD PRESSURE: 70 MMHG | BODY MASS INDEX: 20.49 KG/M2 | TEMPERATURE: 99 F

## 2019-12-19 DIAGNOSIS — Z11.3 SCREEN FOR STD (SEXUALLY TRANSMITTED DISEASE): Primary | ICD-10-CM

## 2019-12-19 PROCEDURE — 99213 OFFICE O/P EST LOW 20 MIN: CPT | Performed by: INTERNAL MEDICINE

## 2019-12-19 NOTE — PROGRESS NOTES
Patient presents with:  STD      HPI: Miguel Jimenez presents today for STD testing. No symptoms. Just wants to make sure she's well. 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 Review of Systems   All other systems reviewed and are negative.       Pa Reassurance given. 3. RTC Spring 2020 for CPX. Total face to face time was 15 minutes, more than 50% of the time was spent in counseling and/or coordination of care related to care plan. Salomón Sanz verbally agrees to and understands the plan as outlined above.

## 2019-12-30 ENCOUNTER — LAB ENCOUNTER (OUTPATIENT)
Dept: LAB | Age: 59
End: 2019-12-30
Attending: INTERNAL MEDICINE
Payer: COMMERCIAL

## 2019-12-30 DIAGNOSIS — Z00.00 ROUTINE GENERAL MEDICAL EXAMINATION AT A HEALTH CARE FACILITY: ICD-10-CM

## 2019-12-30 DIAGNOSIS — Z11.3 SCREEN FOR STD (SEXUALLY TRANSMITTED DISEASE): ICD-10-CM

## 2019-12-30 LAB
ALBUMIN SERPL-MCNC: 3.8 G/DL (ref 3.4–5)
ALBUMIN/GLOB SERPL: 1.2 {RATIO} (ref 1–2)
ALP LIVER SERPL-CCNC: 45 U/L (ref 46–118)
ALT SERPL-CCNC: 23 U/L (ref 13–56)
ANION GAP SERPL CALC-SCNC: 5 MMOL/L (ref 0–18)
AST SERPL-CCNC: 24 U/L (ref 15–37)
BASOPHILS # BLD AUTO: 0.03 X10(3) UL (ref 0–0.2)
BASOPHILS NFR BLD AUTO: 0.9 %
BILIRUB SERPL-MCNC: 0.7 MG/DL (ref 0.1–2)
BILIRUB UR QL STRIP.AUTO: NEGATIVE
BUN BLD-MCNC: 8 MG/DL (ref 7–18)
BUN/CREAT SERPL: 12.1 (ref 10–20)
CALCIUM BLD-MCNC: 8.6 MG/DL (ref 8.5–10.1)
CHLORIDE SERPL-SCNC: 107 MMOL/L (ref 98–112)
CHOLEST SMN-MCNC: 217 MG/DL (ref ?–200)
CO2 SERPL-SCNC: 28 MMOL/L (ref 21–32)
COLOR UR AUTO: YELLOW
CREAT BLD-MCNC: 0.66 MG/DL (ref 0.55–1.02)
DEPRECATED RDW RBC AUTO: 42.7 FL (ref 35.1–46.3)
EOSINOPHIL # BLD AUTO: 0.12 X10(3) UL (ref 0–0.7)
EOSINOPHIL NFR BLD AUTO: 3.4 %
ERYTHROCYTE [DISTWIDTH] IN BLOOD BY AUTOMATED COUNT: 11.2 % (ref 11–15)
EST. AVERAGE GLUCOSE BLD GHB EST-MCNC: 105 MG/DL (ref 68–126)
GLOBULIN PLAS-MCNC: 3.1 G/DL (ref 2.8–4.4)
GLUCOSE BLD-MCNC: 90 MG/DL (ref 70–99)
GLUCOSE UR STRIP.AUTO-MCNC: NEGATIVE MG/DL
HBA1C MFR BLD HPLC: 5.3 % (ref ?–5.7)
HCT VFR BLD AUTO: 41.4 % (ref 35–48)
HCV AB SERPL QL IA: NONREACTIVE
HDLC SERPL-MCNC: 93 MG/DL (ref 40–59)
HGB BLD-MCNC: 13.6 G/DL (ref 12–16)
IMM GRANULOCYTES # BLD AUTO: 0.02 X10(3) UL (ref 0–1)
IMM GRANULOCYTES NFR BLD: 0.6 %
KETONES UR STRIP.AUTO-MCNC: NEGATIVE MG/DL
LDLC SERPL CALC-MCNC: 112 MG/DL (ref ?–100)
LEUKOCYTE ESTERASE UR QL STRIP.AUTO: NEGATIVE
LYMPHOCYTES # BLD AUTO: 1.18 X10(3) UL (ref 1–4)
LYMPHOCYTES NFR BLD AUTO: 33.6 %
M PROTEIN MFR SERPL ELPH: 6.9 G/DL (ref 6.4–8.2)
MCH RBC QN AUTO: 33.6 PG (ref 26–34)
MCHC RBC AUTO-ENTMCNC: 32.9 G/DL (ref 31–37)
MCV RBC AUTO: 102.2 FL (ref 80–100)
MONOCYTES # BLD AUTO: 0.36 X10(3) UL (ref 0.1–1)
MONOCYTES NFR BLD AUTO: 10.3 %
NEUTROPHILS # BLD AUTO: 1.8 X10 (3) UL (ref 1.5–7.7)
NEUTROPHILS # BLD AUTO: 1.8 X10(3) UL (ref 1.5–7.7)
NEUTROPHILS NFR BLD AUTO: 51.2 %
NITRITE UR QL STRIP.AUTO: NEGATIVE
NONHDLC SERPL-MCNC: 124 MG/DL (ref ?–130)
OSMOLALITY SERPL CALC.SUM OF ELEC: 288 MOSM/KG (ref 275–295)
PATIENT FASTING Y/N/NP: YES
PATIENT FASTING Y/N/NP: YES
PH UR STRIP.AUTO: 8 [PH] (ref 4.5–8)
PLATELET # BLD AUTO: 235 10(3)UL (ref 150–450)
POTASSIUM SERPL-SCNC: 4.6 MMOL/L (ref 3.5–5.1)
PROT UR STRIP.AUTO-MCNC: NEGATIVE MG/DL
RBC # BLD AUTO: 4.05 X10(6)UL (ref 3.8–5.3)
RBC UR QL AUTO: NEGATIVE
SODIUM SERPL-SCNC: 140 MMOL/L (ref 136–145)
SP GR UR STRIP.AUTO: 1.02 (ref 1–1.03)
T PALLIDUM AB SER QL IA: NONREACTIVE
TRIGL SERPL-MCNC: 59 MG/DL (ref 30–149)
TSI SER-ACNC: 2 MIU/ML (ref 0.36–3.74)
UROBILINOGEN UR STRIP.AUTO-MCNC: <2 MG/DL
VIT D+METAB SERPL-MCNC: 47 NG/ML (ref 30–100)
VLDLC SERPL CALC-MCNC: 12 MG/DL (ref 0–30)
WBC # BLD AUTO: 3.5 X10(3) UL (ref 4–11)

## 2019-12-30 PROCEDURE — 83036 HEMOGLOBIN GLYCOSYLATED A1C: CPT | Performed by: INTERNAL MEDICINE

## 2019-12-30 PROCEDURE — 80050 GENERAL HEALTH PANEL: CPT | Performed by: INTERNAL MEDICINE

## 2019-12-30 PROCEDURE — 80061 LIPID PANEL: CPT | Performed by: INTERNAL MEDICINE

## 2019-12-30 PROCEDURE — 87491 CHLMYD TRACH DNA AMP PROBE: CPT | Performed by: INTERNAL MEDICINE

## 2019-12-30 PROCEDURE — 36415 COLL VENOUS BLD VENIPUNCTURE: CPT | Performed by: INTERNAL MEDICINE

## 2019-12-30 PROCEDURE — 82306 VITAMIN D 25 HYDROXY: CPT | Performed by: INTERNAL MEDICINE

## 2019-12-30 PROCEDURE — 87389 HIV-1 AG W/HIV-1&-2 AB AG IA: CPT | Performed by: INTERNAL MEDICINE

## 2019-12-30 PROCEDURE — 86803 HEPATITIS C AB TEST: CPT | Performed by: INTERNAL MEDICINE

## 2019-12-30 PROCEDURE — 87591 N.GONORRHOEAE DNA AMP PROB: CPT | Performed by: INTERNAL MEDICINE

## 2019-12-30 PROCEDURE — 86694 HERPES SIMPLEX NES ANTBDY: CPT | Performed by: INTERNAL MEDICINE

## 2019-12-30 PROCEDURE — 81003 URINALYSIS AUTO W/O SCOPE: CPT | Performed by: INTERNAL MEDICINE

## 2019-12-30 PROCEDURE — 86780 TREPONEMA PALLIDUM: CPT | Performed by: INTERNAL MEDICINE

## 2019-12-31 LAB
C TRACH DNA SPEC QL NAA+PROBE: NEGATIVE
N GONORRHOEA DNA SPEC QL NAA+PROBE: NEGATIVE

## 2020-01-05 LAB — HSV TYPE 1/2 COMBINED AB, IGG: >22.4 IV

## 2020-02-11 RX ORDER — DIAZEPAM 5 MG/1
5 TABLET ORAL DAILY PRN
Qty: 10 TABLET | Refills: 0 | Status: SHIPPED | OUTPATIENT
Start: 2020-02-11 | End: 2020-05-05

## 2020-02-11 NOTE — TELEPHONE ENCOUNTER
diazepam 5 MG Oral Tab     Last OV relevant to medication: 12-     Last refill date: 12- # 10 tabs with 0 refill    When pt was asked to return for OV:spring 2020    Upcoming appt/reason: none scheduled     Labs: 12--lipid, cbc, cmp

## 2020-02-27 ENCOUNTER — HOSPITAL ENCOUNTER (EMERGENCY)
Age: 60
Discharge: HOME | End: 2020-02-27
Attending: EMERGENCY MEDICINE
Payer: COMMERCIAL

## 2020-02-27 VITALS
OXYGEN SATURATION: 97 % | SYSTOLIC BLOOD PRESSURE: 158 MMHG | BODY MASS INDEX: 21 KG/M2 | HEART RATE: 90 BPM | TEMPERATURE: 98 F | WEIGHT: 127.44 LBS | RESPIRATION RATE: 16 BRPM | DIASTOLIC BLOOD PRESSURE: 90 MMHG

## 2020-02-27 DIAGNOSIS — S71.001A BLEEDING FROM RIGHT HIP WOUND, INITIAL ENCOUNTER: Primary | ICD-10-CM

## 2020-02-27 PROCEDURE — 99283 EMERGENCY DEPT VISIT LOW MDM: CPT

## 2020-02-27 PROCEDURE — 12001 RPR S/N/AX/GEN/TRNK 2.5CM/<: CPT

## 2020-02-27 PROCEDURE — 99282 EMERGENCY DEPT VISIT SF MDM: CPT

## 2020-02-28 NOTE — ED PROVIDER NOTES
Patient Seen in: [facility] In [place]      History   Patient presents with:  Postop/Procedure Problem    Stated Complaint:  wound bleeding ;s/p implant today    HPI    68-year-old female comes to the hospital complaint having difficu LMP 06/22/2016 (Approximate)   SpO2 97%   BMI 20.57 kg/m²         Physical Exam    Extremity right buttocks has a 0.5 cm open wound with active bleeding at this time with no other significant erythema, pus noted.     ED Course   Labs Reviewed - No data to

## 2020-02-28 NOTE — ED INITIAL ASSESSMENT (HPI)
States she had a hormone device implanted on her right hip today and site has been bleeding this evening.

## 2020-03-09 ENCOUNTER — OFFICE VISIT (OUTPATIENT)
Dept: INTERNAL MEDICINE CLINIC | Facility: CLINIC | Age: 60
End: 2020-03-09
Payer: COMMERCIAL

## 2020-03-09 ENCOUNTER — TELEPHONE (OUTPATIENT)
Dept: INTERNAL MEDICINE CLINIC | Facility: CLINIC | Age: 60
End: 2020-03-09

## 2020-03-09 VITALS
SYSTOLIC BLOOD PRESSURE: 106 MMHG | BODY MASS INDEX: 21.17 KG/M2 | TEMPERATURE: 99 F | DIASTOLIC BLOOD PRESSURE: 72 MMHG | HEIGHT: 66 IN | HEART RATE: 78 BPM | WEIGHT: 131.75 LBS

## 2020-03-09 DIAGNOSIS — Z18.9 RETAINED SUTURE, INITIAL ENCOUNTER: Primary | ICD-10-CM

## 2020-03-09 DIAGNOSIS — T81.89XA RETAINED SUTURE, INITIAL ENCOUNTER: Primary | ICD-10-CM

## 2020-03-09 NOTE — TELEPHONE ENCOUNTER
Patient calling to discuss a stitch being removed from her hip area; she is concerned it is a follow up from ER and she was supposed to be seen to have it removed by pcp.   Attempted to schedule for first available which was Wednesday this week, and she ryne

## 2020-03-09 NOTE — PROGRESS NOTES
Patient presents with: Other: stich removal R hip       HPI: Nick Charles presents today for 1 stitch removal.  She had sutures placed recently into the R hip region after getting SQ hormonal pellet injections placed.   She [facility] on 2[facility]27/20 w/ bl kg)  12/19/19 : 127 lb 8 oz (57.8 kg)  06/26/19 : 125 lb (56.7 kg)  05/14/19 : 127 lb 8 oz (57.8 kg)  04/09/18 : 122 lb (55.3 kg)  Gen - A&Ox3, NAD  Skin - tiny microfiber of stitching is in place but there is skin epithelialization already in place.

## 2020-05-04 NOTE — TELEPHONE ENCOUNTER
Diazepam 5 mg  Last OV relevant to medication: 5-14-19  Last refill date: 2-11-20 #/refills: 0  When pt was asked to return for OV: 1 yr  Upcoming appt/reason: none  Recent labs: none

## 2020-05-05 RX ORDER — DIAZEPAM 5 MG/1
TABLET ORAL
Qty: 10 TABLET | Refills: 0 | Status: SHIPPED | OUTPATIENT
Start: 2020-05-05 | End: 2020-07-10

## 2020-06-17 ENCOUNTER — TELEPHONE (OUTPATIENT)
Dept: INTERNAL MEDICINE CLINIC | Facility: CLINIC | Age: 60
End: 2020-06-17

## 2020-06-17 NOTE — TELEPHONE ENCOUNTER
From: German Fothergill Volling  To: Janice Patel MD  Sent: 6/10/2020  3:47 PM CDT  Subject: Non-Urgent Medical Question    Hello dr Alexus Garcia and staff.   I went to see my gynecologist for hormone replacement and have some questions    1).  My Vit B level is low.  He sug

## 2020-07-10 RX ORDER — DIAZEPAM 5 MG/1
TABLET ORAL
Qty: 10 TABLET | Refills: 0 | Status: SHIPPED | OUTPATIENT
Start: 2020-07-10 | End: 2020-08-27

## 2020-07-10 NOTE — TELEPHONE ENCOUNTER
Diazepam 5 mg 1 tab prn filled 5-5-20 10 with 0 refills     LOV 3-9-20    RTC PRN or at next regularly scheduled OV.    No upcoming apt on file   Labs 12-30-19

## 2020-08-27 DIAGNOSIS — I10 ESSENTIAL HYPERTENSION: ICD-10-CM

## 2020-08-27 RX ORDER — DIAZEPAM 5 MG/1
TABLET ORAL
Qty: 10 TABLET | Refills: 0 | Status: SHIPPED | OUTPATIENT
Start: 2020-08-27 | End: 2020-09-30

## 2020-08-27 RX ORDER — LOSARTAN POTASSIUM 25 MG/1
TABLET ORAL
Qty: 90 TABLET | Refills: 1 | Status: SHIPPED | OUTPATIENT
Start: 2020-08-27 | End: 2021-03-08

## 2020-08-27 NOTE — TELEPHONE ENCOUNTER
Diazepam 5 mg 2 tab prn filled 7-10-20 10 with 0 refills   Losartan 25 mg 1 tab daily filled 8-20-19 90 with 3 refills    LOV 12-19-20   . RTC Spring 2020 for CPX.      No upcoming apt on file   Labs 12-20-19

## 2020-09-08 ENCOUNTER — OFFICE VISIT (OUTPATIENT)
Dept: PODIATRY CLINIC | Facility: CLINIC | Age: 60
End: 2020-09-08
Payer: COMMERCIAL

## 2020-09-08 VITALS — HEIGHT: 66 IN | WEIGHT: 125 LBS | BODY MASS INDEX: 20.09 KG/M2

## 2020-09-08 DIAGNOSIS — M72.2 FIBROMATOSIS OF PLANTAR FASCIA: Primary | ICD-10-CM

## 2020-09-08 PROCEDURE — 99214 OFFICE O/P EST MOD 30 MIN: CPT | Performed by: PODIATRIST

## 2020-09-08 PROCEDURE — 3008F BODY MASS INDEX DOCD: CPT | Performed by: PODIATRIST

## 2020-09-08 NOTE — PROGRESS NOTES
Darren Lyon is a 64year old female. Patient presents with: Foot Pain: swollen lump on bottom of right foot        HPI:     Presents today bothered by this lump in the mid arch of her right foot.   She states she plays tennis and at times it gets painf       Spouse name: Not on file      Number of children: Not on file      Years of education: Not on file      Highest education level: Not on file    Tobacco Use      Smoking status: Never Smoker      Smokeless tobacco: Never Used    Substance and S orders for this visit:    Fibromatosis of plantar fascia        Plan: We discussed with her treatment options felt that we best to keep it under observation did not feel surgical intervention to remove it was warranted and we explained to her in great deta

## 2020-09-16 ENCOUNTER — TELEPHONE (OUTPATIENT)
Dept: INTERNAL MEDICINE CLINIC | Facility: CLINIC | Age: 60
End: 2020-09-16

## 2020-09-16 DIAGNOSIS — D22.9 SKIN MOLE: Primary | ICD-10-CM

## 2020-09-16 DIAGNOSIS — Z00.00 ROUTINE GENERAL MEDICAL EXAMINATION AT A HEALTH CARE FACILITY: Primary | ICD-10-CM

## 2020-09-16 NOTE — TELEPHONE ENCOUNTER
Patient calling to request lab orders be entered prior to her October 2020 appointment for her physical; please call so she knows she can schedule w/ [facility]

## 2020-09-16 NOTE — TELEPHONE ENCOUNTER
Patient would like a recommendation for a dermatologist from Dr Gwendlyn Jeans please call with this information

## 2020-09-16 NOTE — TELEPHONE ENCOUNTER
Lab orders pended for your review and approval if okay. Last routine labs completed on 12/30/2019. Please add dx code and advise. Thank you.

## 2020-09-21 NOTE — TELEPHONE ENCOUNTER
Dr. Boggs Spikes over at the [address] [place] [address]. Erich Medal. Tali Conn MD  Diplomate, American Board of Internal Medicine  Member, [employer] of [address] [facility]  [address]  [address] [facility],4Th Floor, [address], [facility], [address] [place] [address]  T: [phone]

## 2020-09-21 NOTE — TELEPHONE ENCOUNTER
Pt requesting derm referral for raised spot on top of her scalp. No c/o drainage or pain. Been there for a few months now.

## 2020-09-23 NOTE — TELEPHONE ENCOUNTER
Orders signed, notify pt. Sedrick Ledezma. Patricia Verdin MD  Diplomate, American Board of Internal Medicine  Member, [employer] of [address] [facility]  [address]  [facility], [address], [facility], [address] [place] [address]  T: [phone]; F: [address]

## 2020-09-29 NOTE — TELEPHONE ENCOUNTER
Requesting DIAZEPAM 5 MG Oral Tab  LOV: 12/11/19  RTC: 2 months  Last Relevant Labs: 12/30/19  Filled: 8/27/20 #10 with 0 refills    Future Appointments   Date Time Provider Reagan Butcher   10/29/2020  3:15 PM Nishi Sue MD [facility] [place]

## 2020-09-30 RX ORDER — DIAZEPAM 5 MG/1
TABLET ORAL
Qty: 10 TABLET | Refills: 0 | Status: SHIPPED | OUTPATIENT
Start: 2020-09-30 | End: 2020-11-03

## 2020-10-07 ENCOUNTER — PATIENT MESSAGE (OUTPATIENT)
Dept: INTERNAL MEDICINE CLINIC | Facility: CLINIC | Age: 60
End: 2020-10-07

## 2020-10-09 NOTE — TELEPHONE ENCOUNTER
Zolpidem 5 mg 1 tab nightly prn filled only as historical     LOV 3-9-20   . RTC PRN or at next regularly scheduled OV.     Next apt 10-29-20   Labs 12-30-19

## 2020-10-09 NOTE — TELEPHONE ENCOUNTER
From: Addison Wolff  To: Tamera De Anda MD  Sent: 10/7/2020 5:47 PM CDT  Subject: Prescription Question    Hello: I am trying to get my zolpidem renewed. .. I requested Nilson to contact you to refill.  What is the status?    tkx  Moira Solid

## 2020-10-10 RX ORDER — ZOLPIDEM TARTRATE 5 MG/1
5 TABLET ORAL NIGHTLY PRN
Qty: 30 TABLET | Refills: 0 | Status: SHIPPED | OUTPATIENT
Start: 2020-10-10 | End: 2020-11-09

## 2020-10-19 ENCOUNTER — PATIENT MESSAGE (OUTPATIENT)
Dept: INTERNAL MEDICINE CLINIC | Facility: CLINIC | Age: 60
End: 2020-10-19

## 2020-10-20 NOTE — TELEPHONE ENCOUNTER
From: Mellisa Wolff  To: Ann Tucker MD  Sent: 10/19/2020 6:24 PM CDT  Subject: Referral Request    Hello: Can Dr Osiel Hart recommend a new [facility] primary care dr for me? Since he is going to a new program that I won't be able to join due to pricing.

## 2020-10-22 ENCOUNTER — LAB ENCOUNTER (OUTPATIENT)
Dept: LAB | Age: 60
End: 2020-10-22
Attending: INTERNAL MEDICINE
Payer: COMMERCIAL

## 2020-10-22 DIAGNOSIS — Z00.00 ROUTINE GENERAL MEDICAL EXAMINATION AT A HEALTH CARE FACILITY: ICD-10-CM

## 2020-10-22 PROCEDURE — 83036 HEMOGLOBIN GLYCOSYLATED A1C: CPT | Performed by: INTERNAL MEDICINE

## 2020-10-22 PROCEDURE — 81003 URINALYSIS AUTO W/O SCOPE: CPT | Performed by: INTERNAL MEDICINE

## 2020-10-22 PROCEDURE — 36415 COLL VENOUS BLD VENIPUNCTURE: CPT | Performed by: INTERNAL MEDICINE

## 2020-10-22 PROCEDURE — 80061 LIPID PANEL: CPT | Performed by: INTERNAL MEDICINE

## 2020-10-22 PROCEDURE — 80050 GENERAL HEALTH PANEL: CPT | Performed by: INTERNAL MEDICINE

## 2020-10-22 PROCEDURE — 82306 VITAMIN D 25 HYDROXY: CPT | Performed by: INTERNAL MEDICINE

## 2020-11-03 ENCOUNTER — PATIENT MESSAGE (OUTPATIENT)
Dept: INTERNAL MEDICINE CLINIC | Facility: CLINIC | Age: 60
End: 2020-11-03

## 2020-11-03 NOTE — TELEPHONE ENCOUNTER
From: Sandra Wolff  To: Erica Douglass MD  Sent: 11/3/2020 3:00 PM CST  Subject: Other    Is dr Betsy Irvin still taking messages in my chart or thru [facility] ? I’m freaking out I might have Covid . Traveled last week and now have a cold . .. test scheduled for [facility]

## 2020-11-03 NOTE — TELEPHONE ENCOUNTER
Pt refill request diazepam    LOV 3/9/20     Diazepam 5 mg , take one tab daily PRN anxiety, #10 + 0R    Script pending MD signature

## 2020-11-03 NOTE — TELEPHONE ENCOUNTER
Trip to [place], had COVID test negative before went on trip 10/19/20 at [facility]     There until Oct 30 and returned home    C/o head cold, sinus HA    no fever, no GI symptoms    Taking Nyquil and zinc, with good relief    [facility] COVID  testing scheduled 11/4/20

## 2020-11-04 ENCOUNTER — TELEPHONE (OUTPATIENT)
Dept: INTERNAL MEDICINE CLINIC | Facility: CLINIC | Age: 60
End: 2020-11-04

## 2020-11-04 RX ORDER — DIAZEPAM 5 MG/1
5 TABLET ORAL DAILY PRN
Qty: 10 TABLET | Refills: 0 | Status: SHIPPED | OUTPATIENT
Start: 2020-11-04 | End: 2021-01-06

## 2020-11-04 NOTE — TELEPHONE ENCOUNTER
Please set up a virtual visit for this week, 30 min slot. I received a message on the [facility] physician portal from the patient. She went to [place] for a girls trip last week. Got back on Friday (10/30). She started having some URI symptoms.  Was going for a

## 2020-11-05 NOTE — TELEPHONE ENCOUNTER
Per Rafaela Gonzalez wants to do a Doximity with pt for 11/6/20 at 3:30 pm.  Called Pt LVM to call [facility] to relay message and set up apt.

## 2020-11-06 ENCOUNTER — TELEMEDICINE (OUTPATIENT)
Dept: INTERNAL MEDICINE CLINIC | Facility: CLINIC | Age: 60
End: 2020-11-06
Payer: COMMERCIAL

## 2020-11-06 DIAGNOSIS — Z20.822 EXPOSURE TO COVID-19 VIRUS: Primary | ICD-10-CM

## 2020-11-06 PROCEDURE — 99213 OFFICE O/P EST LOW 20 MIN: CPT | Performed by: INTERNAL MEDICINE

## 2020-11-06 NOTE — PROGRESS NOTES
This is a telemedicine visit with live, interactive video and audio. Patient understands and accepts financial responsibility for any deductible, co-insurance and/or co-pays associated with this service.     Patient presents with:  Covid: Possible expos Social History    Tobacco Use      Smoking status: Never Smoker      Smokeless tobacco: Never Used    Alcohol use:  Yes      Alcohol/week: 21.0 standard drinks      Types: 21 Glasses of wine per week      Comment: 2 glasses of wine/night    Drug use: N

## 2020-11-07 NOTE — TELEPHONE ENCOUNTER
Virtual visit conducted w/ patient yesterday. Adriana Mcgarry. Alexus Garcia MD  Diplomate, American Board of Internal Medicine  Member, [facility] ([email or web address])  Affiliate Physician, ADRIANNA

## 2020-11-09 RX ORDER — ZOLPIDEM TARTRATE 5 MG/1
5 TABLET ORAL NIGHTLY PRN
Qty: 30 TABLET | Refills: 5 | Status: SHIPPED | OUTPATIENT
Start: 2020-11-09 | End: 2020-12-15

## 2020-11-09 NOTE — TELEPHONE ENCOUNTER
Failed protocol     Last refill:  10/10/2020 Zolpidem 5 mg #30 NR    LOV:   11/6/2020 Dr Janet Sotelo   2.  RTC PRN or at next regularly scheduled OV for [facility].  No FOV scheduled    Miscellaneous   Janet Sotelo [facility] 11/1/20

## 2020-12-01 ENCOUNTER — PATIENT MESSAGE (OUTPATIENT)
Dept: INTERNAL MEDICINE CLINIC | Facility: CLINIC | Age: 60
End: 2020-12-01

## 2020-12-01 NOTE — TELEPHONE ENCOUNTER
From: Shaneka Wolff  To: Kip Brooks MD  Sent: 12/1/2020 10:35 AM CST  Subject: Non-Urgent Medical Question    Hello dr Norman Jesus. Would you be able to prescribe a different sleep med? I’m finding that the zolpidem is not really working for me anymore.  Maybe

## 2020-12-15 RX ORDER — ZOLPIDEM TARTRATE 1.75 MG/1
1 TABLET SUBLINGUAL NIGHTLY PRN
Qty: 30 TABLET | Refills: 0 | Status: SHIPPED | OUTPATIENT
Start: 2020-12-15 | End: 2021-04-19

## 2020-12-16 ENCOUNTER — HOSPITAL ENCOUNTER (OUTPATIENT)
Dept: MAMMOGRAPHY | Age: 60
Discharge: HOME | End: 2020-12-16
Attending: OBSTETRICS & GYNECOLOGY
Payer: COMMERCIAL

## 2020-12-16 DIAGNOSIS — Z12.31 BREAST CANCER SCREENING BY MAMMOGRAM: ICD-10-CM

## 2020-12-16 PROCEDURE — 77063 BREAST TOMOSYNTHESIS BI: CPT | Performed by: OBSTETRICS & GYNECOLOGY

## 2020-12-16 PROCEDURE — 77067 SCR MAMMO BI INCL CAD: CPT | Performed by: OBSTETRICS & GYNECOLOGY

## 2020-12-28 ENCOUNTER — TELEPHONE (OUTPATIENT)
Dept: INTERNAL MEDICINE CLINIC | Facility: CLINIC | Age: 60
End: 2020-12-28

## 2020-12-28 NOTE — TELEPHONE ENCOUNTER
Called pt's pharmacy and they informed zolpidem will be ready for  tomorrow. Left message on pt's ph to informed above message.

## 2020-12-28 NOTE — TELEPHONE ENCOUNTER
Patient states Dr. Chioma Hay was supposed to be sending in a new sleep medication for her. She has not heard back from our office since last week.

## 2020-12-29 ENCOUNTER — TELEPHONE (OUTPATIENT)
Dept: INTERNAL MEDICINE CLINIC | Facility: CLINIC | Age: 60
End: 2020-12-29

## 2020-12-29 DIAGNOSIS — Z01.818 PRE-OP TESTING: Primary | ICD-10-CM

## 2020-12-29 NOTE — TELEPHONE ENCOUNTER
Received fax from Dr Tariq Naranjo requesting pre-op testing to be completed:  EKG  CBC w/differential & platelets  PT/INR  PTT   CMP    Need to fax results to [phone]    Would you like to see pt in office for pre-op or place tests requested?

## 2020-12-29 NOTE — TELEPHONE ENCOUNTER
Shonda Crowell MD  Diplomate, American Board of Internal Medicine  Member, [address] [address] ([email or web address])  Affiliate Physician, [facility] ([email or web address])

## 2020-12-29 NOTE — TELEPHONE ENCOUNTER
1. Please find out what procedure she's getting for our records. 2. Please verify surgical date. 3. Please verify that her upcoming 1/5/21 visit will be for pre-op clearance. Giovanny Hazel.  Rina Kamara MD  Diplomate, American Board of Internal Medicine  Member, A

## 2020-12-29 NOTE — TELEPHONE ENCOUNTER
Called Dr Mae Gonzalez (plastic surgeon) at [phone]    Pt having several esthetic procedures:   -Correction of external nasal deformity.    -Revision of mammary scars  -Further bilateral temporal lift  -Further bilateral cheek lift   -Derm abrasion of m

## 2021-01-05 ENCOUNTER — LAB ENCOUNTER (OUTPATIENT)
Dept: LAB | Age: 61
End: 2021-01-05
Attending: INTERNAL MEDICINE
Payer: COMMERCIAL

## 2021-01-05 ENCOUNTER — OFFICE VISIT (OUTPATIENT)
Dept: INTERNAL MEDICINE CLINIC | Facility: CLINIC | Age: 61
End: 2021-01-05
Payer: COMMERCIAL

## 2021-01-05 VITALS
HEIGHT: 66 IN | WEIGHT: 134 LBS | SYSTOLIC BLOOD PRESSURE: 116 MMHG | HEART RATE: 73 BPM | DIASTOLIC BLOOD PRESSURE: 80 MMHG | BODY MASS INDEX: 21.53 KG/M2 | TEMPERATURE: 97 F | OXYGEN SATURATION: 99 % | RESPIRATION RATE: 16 BRPM

## 2021-01-05 DIAGNOSIS — I10 ESSENTIAL HYPERTENSION: ICD-10-CM

## 2021-01-05 DIAGNOSIS — Z41.1 ENCOUNTER FOR COSMETIC SURGERY: ICD-10-CM

## 2021-01-05 DIAGNOSIS — Z01.818 PREOPERATIVE CLEARANCE: ICD-10-CM

## 2021-01-05 DIAGNOSIS — Z01.818 PREOPERATIVE CLEARANCE: Primary | ICD-10-CM

## 2021-01-05 PROBLEM — K21.9 LPRD (LARYNGOPHARYNGEAL REFLUX DISEASE): Status: ACTIVE | Noted: 2021-01-05

## 2021-01-05 PROBLEM — K21.9 GASTROESOPHAGEAL REFLUX DISEASE WITHOUT ESOPHAGITIS: Status: ACTIVE | Noted: 2021-01-05

## 2021-01-05 LAB
ALBUMIN SERPL-MCNC: 3.8 G/DL (ref 3.4–5)
ALBUMIN/GLOB SERPL: 1.2 {RATIO} (ref 1–2)
ALP LIVER SERPL-CCNC: 59 U/L
ALT SERPL-CCNC: 29 U/L
ANION GAP SERPL CALC-SCNC: 5 MMOL/L (ref 0–18)
APTT PPP: 30.4 SECONDS (ref 25.4–36.1)
AST SERPL-CCNC: 30 U/L (ref 15–37)
BASOPHILS # BLD AUTO: 0.03 X10(3) UL (ref 0–0.2)
BASOPHILS NFR BLD AUTO: 0.6 %
BILIRUB SERPL-MCNC: 0.4 MG/DL (ref 0.1–2)
BUN BLD-MCNC: 16 MG/DL (ref 7–18)
BUN/CREAT SERPL: 15.7 (ref 10–20)
CALCIUM BLD-MCNC: 8.9 MG/DL (ref 8.5–10.1)
CHLORIDE SERPL-SCNC: 105 MMOL/L (ref 98–112)
CO2 SERPL-SCNC: 28 MMOL/L (ref 21–32)
CREAT BLD-MCNC: 1.02 MG/DL
DEPRECATED RDW RBC AUTO: 40.6 FL (ref 35.1–46.3)
EOSINOPHIL # BLD AUTO: 0.08 X10(3) UL (ref 0–0.7)
EOSINOPHIL NFR BLD AUTO: 1.5 %
ERYTHROCYTE [DISTWIDTH] IN BLOOD BY AUTOMATED COUNT: 11.4 % (ref 11–15)
GLOBULIN PLAS-MCNC: 3.3 G/DL (ref 2.8–4.4)
GLUCOSE BLD-MCNC: 82 MG/DL (ref 70–99)
HCT VFR BLD AUTO: 38.4 %
HGB BLD-MCNC: 13.3 G/DL
IMM GRANULOCYTES # BLD AUTO: 0.01 X10(3) UL (ref 0–1)
IMM GRANULOCYTES NFR BLD: 0.2 %
INR BLD: 0.94 (ref 0.89–1.11)
LYMPHOCYTES # BLD AUTO: 1.73 X10(3) UL (ref 1–4)
LYMPHOCYTES NFR BLD AUTO: 33.3 %
M PROTEIN MFR SERPL ELPH: 7.1 G/DL (ref 6.4–8.2)
MCH RBC QN AUTO: 33.6 PG (ref 26–34)
MCHC RBC AUTO-ENTMCNC: 34.6 G/DL (ref 31–37)
MCV RBC AUTO: 97 FL
MONOCYTES # BLD AUTO: 0.59 X10(3) UL (ref 0.1–1)
MONOCYTES NFR BLD AUTO: 11.3 %
NEUTROPHILS # BLD AUTO: 2.76 X10 (3) UL (ref 1.5–7.7)
NEUTROPHILS # BLD AUTO: 2.76 X10(3) UL (ref 1.5–7.7)
NEUTROPHILS NFR BLD AUTO: 53.1 %
OSMOLALITY SERPL CALC.SUM OF ELEC: 286 MOSM/KG (ref 275–295)
PATIENT FASTING Y/N/NP: YES
PLATELET # BLD AUTO: 223 10(3)UL (ref 150–450)
POTASSIUM SERPL-SCNC: 4 MMOL/L (ref 3.5–5.1)
PSA SERPL DL<=0.01 NG/ML-MCNC: 12.9 SECONDS (ref 12.4–14.6)
RBC # BLD AUTO: 3.96 X10(6)UL
SODIUM SERPL-SCNC: 138 MMOL/L (ref 136–145)
WBC # BLD AUTO: 5.2 X10(3) UL (ref 4–11)

## 2021-01-05 PROCEDURE — 85610 PROTHROMBIN TIME: CPT

## 2021-01-05 PROCEDURE — 3079F DIAST BP 80-89 MM HG: CPT | Performed by: INTERNAL MEDICINE

## 2021-01-05 PROCEDURE — 3008F BODY MASS INDEX DOCD: CPT | Performed by: INTERNAL MEDICINE

## 2021-01-05 PROCEDURE — 99243 OFF/OP CNSLTJ NEW/EST LOW 30: CPT | Performed by: INTERNAL MEDICINE

## 2021-01-05 PROCEDURE — 85025 COMPLETE CBC W/AUTO DIFF WBC: CPT

## 2021-01-05 PROCEDURE — 36415 COLL VENOUS BLD VENIPUNCTURE: CPT

## 2021-01-05 PROCEDURE — 93000 ELECTROCARDIOGRAM COMPLETE: CPT | Performed by: INTERNAL MEDICINE

## 2021-01-05 PROCEDURE — 80053 COMPREHEN METABOLIC PANEL: CPT

## 2021-01-05 PROCEDURE — 3074F SYST BP LT 130 MM HG: CPT | Performed by: INTERNAL MEDICINE

## 2021-01-05 PROCEDURE — 85730 THROMBOPLASTIN TIME PARTIAL: CPT

## 2021-01-05 RX ORDER — ESTRADIOL 1 MG/G
GEL TOPICAL
COMMUNITY
Start: 2020-12-21

## 2021-01-05 NOTE — PROGRESS NOTES
Patient presents with:  Pre-Op Exam: necklift,browflift and tummy tuck 2/18/21       HPI: Mayte Orta presents today for pre-op clearance. She is set to undergo elective surgery with Dr. Gil Farr from Plastic Surgery on 2/18/21.  Specifically, she'll get a mouth every morning before breakfast. Before meal, Disp: 30 tablet, Rfl: 2    Social History    Tobacco Use      Smoking status: Never Smoker      Smokeless tobacco: Never Used    Alcohol use:  Yes      Alcohol/week: 21.0 standard drinks      Types: 21 Glas oz (57.8 kg)  06/26/19 : 125 lb (56.7 kg)  Gen - A&Ox3, NAD  HEENT - PERRL, EOMI, OP is clear; TMs clear B  Neck - supple, no JVD, no thyromegaly  Lymph - no palp LAD  Lungs - CTAB  CV - RRR, nl s1, s2  Abd - soft, NABS, NT, ND  Ext - no c/c/e  Neuro - CNs

## 2021-01-07 RX ORDER — DIAZEPAM 5 MG/1
5 TABLET ORAL DAILY PRN
Qty: 10 TABLET | Refills: 0 | Status: SHIPPED | OUTPATIENT
Start: 2021-01-07 | End: 2021-02-22

## 2021-01-07 NOTE — TELEPHONE ENCOUNTER
Received e-refill request from pt requesting a refill on Diazepam 5mg every day prn  Last refill was on: 11/04/2020 for 10tabs, 0refills  Last ov was: 01/05/2021 w Dr Luke Wheeler.      Med refill pending review and authorization by MD.

## 2021-02-22 RX ORDER — DIAZEPAM 5 MG/1
5 TABLET ORAL DAILY PRN
Qty: 10 TABLET | Refills: 0 | Status: SHIPPED | OUTPATIENT
Start: 2021-02-22 | End: 2021-05-27

## 2021-02-22 NOTE — TELEPHONE ENCOUNTER
Received fax from [facility] requesting a refill on diazepam 5mg  Last refill was on: 1/7/2021 for 10-tabs, 0-refills.     Med refill pending review and auth by MD.

## 2021-02-28 ENCOUNTER — OFFICE VISIT (OUTPATIENT)
Dept: FAMILY MEDICINE CLINIC | Facility: CLINIC | Age: 61
End: 2021-02-28
Payer: COMMERCIAL

## 2021-02-28 VITALS
DIASTOLIC BLOOD PRESSURE: 82 MMHG | OXYGEN SATURATION: 98 % | WEIGHT: 130 LBS | RESPIRATION RATE: 18 BRPM | HEIGHT: 66 IN | TEMPERATURE: 97 F | HEART RATE: 68 BPM | BODY MASS INDEX: 20.89 KG/M2 | SYSTOLIC BLOOD PRESSURE: 132 MMHG

## 2021-02-28 DIAGNOSIS — H92.01 RIGHT EAR PAIN: Primary | ICD-10-CM

## 2021-02-28 PROCEDURE — 3075F SYST BP GE 130 - 139MM HG: CPT | Performed by: NURSE PRACTITIONER

## 2021-02-28 PROCEDURE — 3008F BODY MASS INDEX DOCD: CPT | Performed by: NURSE PRACTITIONER

## 2021-02-28 PROCEDURE — 99213 OFFICE O/P EST LOW 20 MIN: CPT | Performed by: NURSE PRACTITIONER

## 2021-02-28 PROCEDURE — 3079F DIAST BP 80-89 MM HG: CPT | Performed by: NURSE PRACTITIONER

## 2021-02-28 NOTE — PROGRESS NOTES
CHIEF COMPLAINT:   Patient presents with:  Ear Problem: Entered by patient      HPI:   April Durand is a 64year old female who presents to clinic today with complaints of right ear pain. Started this morning.  Pain is described as sharp stabbing inter /82   Pulse 68   Temp 97.3 °F (36.3 °C)   Resp 18   Ht 5' 6\" (1.676 m)   Wt 130 lb (59 kg)   LMP 06/22/2016 (Approximate)   SpO2 98%   BMI 20.98 kg/m²   GENERAL: well developed, well nourished,in no apparent distress  SKIN: no rashes,no suspicious l Earaches can happen without an infection. They can occur when air and fluid build up behind the eardrum. They may cause a feeling of fullness and discomfort. They may also impair hearing.  This is called otitis media with effusion (OME) or serous otitis med · You may use over-the-counter medicine as directed by your healthcare provider to control pain, unless medicine was prescribed.  If you have chronic liver or kidney disease or ever had a stomach ulcer or GI bleeding, talk with your healthcare provider befo

## 2021-03-06 DIAGNOSIS — I10 ESSENTIAL HYPERTENSION: ICD-10-CM

## 2021-03-08 RX ORDER — LOSARTAN POTASSIUM 25 MG/1
TABLET ORAL
Qty: 90 TABLET | Refills: 3 | Status: SHIPPED | OUTPATIENT
Start: 2021-03-08 | End: 2021-11-15

## 2021-04-19 RX ORDER — ZOLPIDEM TARTRATE 1.75 MG/1
1 TABLET SUBLINGUAL NIGHTLY PRN
Qty: 30 TABLET | Refills: 1 | Status: SHIPPED | OUTPATIENT
Start: 2021-04-19 | End: 2021-08-10

## 2021-05-04 ENCOUNTER — TELEPHONE (OUTPATIENT)
Dept: INTERNAL MEDICINE CLINIC | Facility: CLINIC | Age: 61
End: 2021-05-04

## 2021-05-24 RX ORDER — ZOLPIDEM TARTRATE 5 MG/1
TABLET ORAL
Qty: 30 TABLET | Refills: 0 | OUTPATIENT
Start: 2021-05-24

## 2021-05-24 NOTE — TELEPHONE ENCOUNTER
Zolpidem 5 mg 1 tab nightly prn filled     Last filled Zolpidem 1.75 mg 1 tab nightly prn 4/19/21 30 with 1 refill

## 2021-05-27 RX ORDER — DIAZEPAM 5 MG/1
5 TABLET ORAL DAILY PRN
Qty: 10 TABLET | Refills: 0 | Status: SHIPPED | OUTPATIENT
Start: 2021-05-27 | End: 2021-07-21

## 2021-05-27 NOTE — TELEPHONE ENCOUNTER
Diazepam 5 mg 1 tab daily prn filled 2/22/21 10 with 0 refills     LOV 1/5/21  RTC PRN or at next regularly scheduled OV  Next apt 7/19/21  Labs 1/5/21

## 2021-07-01 ENCOUNTER — MED REC SCAN ONLY (OUTPATIENT)
Dept: INTERNAL MEDICINE CLINIC | Facility: CLINIC | Age: 61
End: 2021-07-01

## 2021-07-21 RX ORDER — DIAZEPAM 5 MG/1
TABLET ORAL
Qty: 10 TABLET | Refills: 0 | Status: SHIPPED | OUTPATIENT
Start: 2021-07-21 | End: 2021-09-25

## 2021-07-21 NOTE — TELEPHONE ENCOUNTER
1- Refill request on Diazepam 0.5mg  2- Quantity of: 10 tabs  3- Instructions: one tab every day prn for anxiety  4- Last refill was on: 5/27/2021 for 10-tabs, 0-refill(s)

## 2021-08-10 ENCOUNTER — TELEPHONE (OUTPATIENT)
Dept: INTERNAL MEDICINE CLINIC | Facility: CLINIC | Age: 61
End: 2021-08-10

## 2021-08-10 RX ORDER — ZOLPIDEM TARTRATE 1.75 MG/1
1 TABLET SUBLINGUAL NIGHTLY PRN
Qty: 30 TABLET | Refills: 1 | Status: SHIPPED | OUTPATIENT
Start: 2021-08-10 | End: 2021-11-23

## 2021-08-10 NOTE — TELEPHONE ENCOUNTER
Patient will be leaving [facility]Dr. Norman Jesus in October because of costs involved. Is it possible for Dr. Norman Jesus to give her some recommendations for a new doctor?     Please call patient

## 2021-08-10 NOTE — TELEPHONE ENCOUNTER
Patient called in requesting refill of:  Zolpidem Tartrate 1.75 MG     To be sent to:  [facility]ARMACY #[id number]- [place], [place] - [address]  AT [address] OF [address], [phone], [phone]    Patient stated this medication is on backorder at [facility]

## 2021-08-13 NOTE — TELEPHONE ENCOUNTER
I spoke w/ Dino Stock. All questions answered. 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 Finances are quite tight at the moment.  I told her that I would look into a doc within [facility] who will be the right fit for her and

## 2021-09-07 ENCOUNTER — NURSE ONLY (OUTPATIENT)
Dept: INTERNAL MEDICINE CLINIC | Facility: CLINIC | Age: 61
End: 2021-09-07
Payer: COMMERCIAL

## 2021-09-07 DIAGNOSIS — Z00.00 LABORATORY EXAMINATION ORDERED AS PART OF A ROUTINE GENERAL MEDICAL EXAMINATION: Primary | ICD-10-CM

## 2021-09-07 LAB
AMB EXT CHOL/HDL RATIO: 2.3
AMB EXT CHOLESTEROL, TOTAL: 219 MG/DL
AMB EXT CMP ALT: 18 U/L
AMB EXT CMP AST: 29 U/L
AMB EXT CREATININE: 0.7 MG/DL
AMB EXT GLUCOSE: 85 MG/DL
AMB EXT HDL CHOLESTEROL: 95 MG/DL
AMB EXT HEMATOCRIT: 40.2
AMB EXT HEMOGLOBIN: 14
AMB EXT HGBA1C: 4.9 %
AMB EXT LDL CHOLESTEROL, DIRECT: 111 MG/DL
AMB EXT MCV: 98
AMB EXT NON HDL CHOL: 124 MG/DL
AMB EXT POSTASSIUM: 5.1 MMOL/L
AMB EXT SODIUM: 137 MMOL/L
AMB EXT TRIGLYCERIDES: 45 MG/DL
AMB EXT TSH: 0.93 MIU/ML
AMB EXT WBC: 3.7 X10(3)UL
BILIRUB UR QL STRIP.AUTO: NEGATIVE
COLOR UR AUTO: YELLOW
GLUCOSE UR STRIP.AUTO-MCNC: NEGATIVE MG/DL
KETONES UR STRIP.AUTO-MCNC: NEGATIVE MG/DL
LEUKOCYTE ESTERASE UR QL STRIP.AUTO: NEGATIVE
NITRITE UR QL STRIP.AUTO: NEGATIVE
PH UR STRIP.AUTO: 8 [PH] (ref 5–8)
PROT UR STRIP.AUTO-MCNC: 30 MG/DL
RBC UR QL AUTO: NEGATIVE
SP GR UR STRIP.AUTO: 1.02 (ref 1–1.03)
UROBILINOGEN UR STRIP.AUTO-MCNC: 2 MG/DL

## 2021-09-07 PROCEDURE — 81001 URINALYSIS AUTO W/SCOPE: CPT | Performed by: INTERNAL MEDICINE

## 2021-09-07 PROCEDURE — 93923 UPR/LXTR ART STDY 3+ LVLS: CPT | Performed by: INTERNAL MEDICINE

## 2021-09-16 ENCOUNTER — OFFICE VISIT (OUTPATIENT)
Dept: INTERNAL MEDICINE CLINIC | Facility: CLINIC | Age: 61
End: 2021-09-16
Payer: COMMERCIAL

## 2021-09-16 VITALS
BODY MASS INDEX: 21.05 KG/M2 | RESPIRATION RATE: 16 BRPM | OXYGEN SATURATION: 98 % | SYSTOLIC BLOOD PRESSURE: 118 MMHG | HEIGHT: 66 IN | TEMPERATURE: 98 F | DIASTOLIC BLOOD PRESSURE: 70 MMHG | WEIGHT: 131 LBS | HEART RATE: 62 BPM

## 2021-09-16 DIAGNOSIS — Z00.00 ROUTINE GENERAL MEDICAL EXAMINATION AT A HEALTH CARE FACILITY: Primary | ICD-10-CM

## 2021-09-16 DIAGNOSIS — F41.1 GENERALIZED ANXIETY DISORDER: ICD-10-CM

## 2021-09-16 DIAGNOSIS — F51.01 PRIMARY INSOMNIA: ICD-10-CM

## 2021-09-16 DIAGNOSIS — J30.89 SEASONAL ALLERGIC RHINITIS DUE TO OTHER ALLERGIC TRIGGER: ICD-10-CM

## 2021-09-16 DIAGNOSIS — I10 ESSENTIAL HYPERTENSION: ICD-10-CM

## 2021-09-16 DIAGNOSIS — R82.90 ABNORMAL URINE FINDING: ICD-10-CM

## 2021-09-16 DIAGNOSIS — K21.9 LPRD (LARYNGOPHARYNGEAL REFLUX DISEASE): ICD-10-CM

## 2021-09-16 DIAGNOSIS — R76.0 ANTIPHOSPHOLIPID ANTIBODY POSITIVE: ICD-10-CM

## 2021-09-16 DIAGNOSIS — E78.00 HYPERCHOLESTEROLEMIA: ICD-10-CM

## 2021-09-16 DIAGNOSIS — E07.9 THYROID DISORDER: ICD-10-CM

## 2021-09-16 PROBLEM — J30.9 ALLERGIC RHINITIS DUE TO ALLERGEN: Status: ACTIVE | Noted: 2021-09-16

## 2021-09-16 LAB
APPEARANCE: CLEAR
BILIRUBIN: NEGATIVE
GLUCOSE (URINE DIPSTICK): NEGATIVE MG/DL
KETONES (URINE DIPSTICK): NEGATIVE MG/DL
LEUKOCYTES: NEGATIVE
MULTISTIX LOT#: 5077 NUMERIC
NITRITE, URINE: NEGATIVE
OCCULT BLOOD: NEGATIVE
PH, URINE: 7.5 (ref 4.5–8)
PROTEIN (URINE DIPSTICK): NEGATIVE MG/DL
SPECIFIC GRAVITY: 1.02 (ref 1–1.03)
URINE-COLOR: YELLOW
UROBILINOGEN,SEMI-QN: 0.2 MG/DL (ref 0–1.9)

## 2021-09-16 PROCEDURE — 3008F BODY MASS INDEX DOCD: CPT | Performed by: INTERNAL MEDICINE

## 2021-09-16 PROCEDURE — 81003 URINALYSIS AUTO W/O SCOPE: CPT | Performed by: INTERNAL MEDICINE

## 2021-09-16 PROCEDURE — 99396 PREV VISIT EST AGE 40-64: CPT | Performed by: INTERNAL MEDICINE

## 2021-09-16 PROCEDURE — 3078F DIAST BP <80 MM HG: CPT | Performed by: INTERNAL MEDICINE

## 2021-09-16 PROCEDURE — 93000 ELECTROCARDIOGRAM COMPLETE: CPT | Performed by: INTERNAL MEDICINE

## 2021-09-16 PROCEDURE — 3074F SYST BP LT 130 MM HG: CPT | Performed by: INTERNAL MEDICINE

## 2021-09-16 RX ORDER — LEVOTHYROXINE SODIUM 0.05 MG/1
1 TABLET ORAL DAILY
COMMUNITY

## 2021-09-16 NOTE — PROGRESS NOTES
Patient presents with:  Physical      HPI: Shivani Costa presents today for [facility] Annual Wellness Program (AWP). Stable health. Continues to center her health around themes of longevity, vitality, and quality of life.  She went for wellness labs thru [facility] Auto-injector, Inject 0.3 mL (1 each total) as directed as needed. , Disp: 1 each, Rfl: 0  •  Zolpidem Tartrate 1.75 MG Sublingual SL Tab, Place 1 tablet under the tongue nightly as needed (maintenance insomnia). , Disp: 30 tablet, Rfl: 1  •  DIAZEPAM 5 MG O 10/22/2019  07/16/2020      Influenza             10/25/2017  10/06/2018  09/13/2020      TDAP                  05/18/2008 06/13/2018      Zoster Vaccine Live (Zostavax)                          05/18/2013      Zoster Vaccine Recombinant Adjuvanted [employer] Calories/Day. ZIBRIO BALANCE & FALL RISK ASSESSMENT done on 9/7/21:  You scored a 7 on the ZIBRIO scale. Your fall risk is considered to be low. Labs (select via [facility] dated 9/7/21):   Myeloperoxidase 201  Hs-CRP 0.7  OxLDL 50  Total cholesterol 219  H Female CPX and [facility] AWP - Stable health. Wellness labs as above. Continue to look for opportunities for improvement. 2. HTN - Stable on prescription medication + lifestyle measures. No new issues.    3. Thyroid disorder - Has essentially been off of Levot Consults:  ELECTROCARDIOGRAM, COMPLETE

## 2021-09-27 RX ORDER — DIAZEPAM 5 MG/1
10 TABLET ORAL DAILY PRN
Qty: 10 TABLET | Refills: 0 | Status: SHIPPED | OUTPATIENT
Start: 2021-09-27 | End: 2022-01-10

## 2021-09-27 NOTE — TELEPHONE ENCOUNTER
1- Refill request on Diazepam 5mg  2- Quantity of: 10 tabs  3- Instructions: BID prn for anxiety  4- Per [place]- last refill was dispensed on: 7/21/2021 for 10-tabs, 0-refill(s) Chart reviewed.   Requested updates from Care Everywhere.  Immunizations reconciled.   HM updated.    Eye cam orders already placed.

## 2021-11-09 ENCOUNTER — HOSPITAL ENCOUNTER (OUTPATIENT)
Dept: CT IMAGING | Age: 61
Discharge: HOME | End: 2021-11-09
Attending: OTOLARYNGOLOGY
Payer: COMMERCIAL

## 2021-11-09 DIAGNOSIS — R09.82 PND (POST-NASAL DRIP): ICD-10-CM

## 2021-11-09 PROCEDURE — 70486 CT MAXILLOFACIAL W/O DYE: CPT | Performed by: OTOLARYNGOLOGY

## 2021-11-09 NOTE — PROGRESS NOTES
LPRD can give mucous feeling .  She had been on reflux medicine in past and it was my understanding it did not make the feeling go away but she can restart it if she wants

## 2021-11-09 NOTE — PROGRESS NOTES
Called and left detailed message on identified VM that can restart reflux medicine if pt wants due to LPRD can give mucous feeling.

## 2021-11-09 NOTE — PROGRESS NOTES
Pt verbalized understanding. She is wondering if [employer] has any role in her mucus? Advised that we would check with both MDs to see if any evidence.

## 2021-11-13 DIAGNOSIS — I10 ESSENTIAL HYPERTENSION: ICD-10-CM

## 2021-11-15 RX ORDER — LOSARTAN POTASSIUM 25 MG/1
TABLET ORAL
Qty: 90 TABLET | Refills: 3 | Status: SHIPPED | OUTPATIENT
Start: 2021-11-15

## 2021-11-23 RX ORDER — ZOLPIDEM TARTRATE 1.75 MG/1
1 TABLET SUBLINGUAL NIGHTLY PRN
Qty: 30 TABLET | Refills: 0 | Status: SHIPPED | OUTPATIENT
Start: 2021-11-23 | End: 2021-12-20

## 2021-11-23 NOTE — TELEPHONE ENCOUNTER
1- Refill request on Zolpidem 1.75mg subl tab  2- Quantity of: 30 tabs  3- Administration instructions: 1 tab at bedtime PRN  4- Per [place]- last refill was dispensed on: 10/18/2021 for 30-tabs    [facility] membership cancelled 10/31/2021. Per [employer] message dated 10/8/21 pt was advised to establish care w PCP at [facility].

## 2021-11-28 ENCOUNTER — PATIENT MESSAGE (OUTPATIENT)
Dept: INTERNAL MEDICINE CLINIC | Facility: CLINIC | Age: 61
End: 2021-11-28

## 2021-11-28 DIAGNOSIS — E07.9 THYROID DISORDER: Primary | ICD-10-CM

## 2021-11-28 DIAGNOSIS — E61.7 DEFICIENCY OF MULTIPLE NUTRIENT ELEMENTS: ICD-10-CM

## 2021-12-01 NOTE — TELEPHONE ENCOUNTER
Testing ordered. Adriana Mcgarry.  Alexus Garcia MD  Diplomate, American Board of Internal Medicine; Member, [employer]  [address] [facility] [address], [address], [address]  [phone] (phone); [phone]

## 2021-12-16 ENCOUNTER — LAB ENCOUNTER (OUTPATIENT)
Dept: LAB | Age: 61
End: 2021-12-16
Attending: INTERNAL MEDICINE
Payer: COMMERCIAL

## 2021-12-16 DIAGNOSIS — E07.9 THYROID DISORDER: ICD-10-CM

## 2021-12-16 DIAGNOSIS — E61.7 DEFICIENCY OF MULTIPLE NUTRIENT ELEMENTS: ICD-10-CM

## 2021-12-16 PROCEDURE — 84443 ASSAY THYROID STIM HORMONE: CPT | Performed by: INTERNAL MEDICINE

## 2021-12-16 PROCEDURE — 82607 VITAMIN B-12: CPT | Performed by: INTERNAL MEDICINE

## 2021-12-16 PROCEDURE — 83550 IRON BINDING TEST: CPT | Performed by: INTERNAL MEDICINE

## 2021-12-16 PROCEDURE — 82728 ASSAY OF FERRITIN: CPT | Performed by: INTERNAL MEDICINE

## 2021-12-16 PROCEDURE — 83018 HEAVY METAL QUAN EACH NES: CPT | Performed by: INTERNAL MEDICINE

## 2021-12-16 PROCEDURE — 84439 ASSAY OF FREE THYROXINE: CPT | Performed by: INTERNAL MEDICINE

## 2021-12-16 PROCEDURE — 83540 ASSAY OF IRON: CPT | Performed by: INTERNAL MEDICINE

## 2021-12-20 RX ORDER — ZOLPIDEM TARTRATE 1.75 MG/1
1 TABLET SUBLINGUAL NIGHTLY PRN
Qty: 30 TABLET | Refills: 0 | Status: SHIPPED | OUTPATIENT
Start: 2021-12-20 | End: 2022-09-20

## 2022-01-10 ENCOUNTER — PATIENT MESSAGE (OUTPATIENT)
Dept: INTERNAL MEDICINE CLINIC | Facility: CLINIC | Age: 62
End: 2022-01-10

## 2022-01-10 RX ORDER — DIAZEPAM 5 MG/1
TABLET ORAL
Qty: 10 TABLET | Refills: 0 | OUTPATIENT
Start: 2022-01-10

## 2022-01-10 RX ORDER — DIAZEPAM 5 MG/1
10 TABLET ORAL DAILY PRN
Qty: 10 TABLET | Refills: 0 | Status: SHIPPED | OUTPATIENT
Start: 2022-01-10

## 2022-01-10 NOTE — TELEPHONE ENCOUNTER
Diazepam 5 mg 2 tab prn filled 9/27/21 10 with 0 refills     LOV 9/16/21  RTC 1 year or PRN  No upcoming apt on file   Labs 12/16/21

## 2022-01-10 NOTE — TELEPHONE ENCOUNTER
[facility] membership cancelled 10/31/2021.   Per [employer] message dated 10/8/21 pt was advised to establish care w PCP at [facility]

## 2022-01-11 NOTE — TELEPHONE ENCOUNTER
From: Myla Wolff  To: Lizzette Valles MD  Sent: 1/10/2022 3:16 PM CST  Subject: Diazepam refill request     Hi dr Joaquin Talbert - I know you’re not my official primary any longer but I haven’t had a chance to establish a new patient status with a new dr yet.  Woul

## 2022-03-16 RX ORDER — DIAZEPAM 5 MG/1
TABLET ORAL
Qty: 10 TABLET | Refills: 0 | OUTPATIENT
Start: 2022-03-16

## 2022-03-16 RX ORDER — ZOLPIDEM TARTRATE 1.75 MG/1
TABLET SUBLINGUAL
Qty: 30 TABLET | Refills: 0 | OUTPATIENT
Start: 2022-03-16

## 2022-03-26 ENCOUNTER — HOSPITAL ENCOUNTER (OUTPATIENT)
Dept: MAMMOGRAPHY | Age: 62
Discharge: HOME | End: 2022-03-26
Attending: OBSTETRICS & GYNECOLOGY
Payer: COMMERCIAL

## 2022-03-26 DIAGNOSIS — Z12.31 SCREENING MAMMOGRAM FOR BREAST CANCER: ICD-10-CM

## 2022-03-26 PROCEDURE — 77063 BREAST TOMOSYNTHESIS BI: CPT | Performed by: OBSTETRICS & GYNECOLOGY

## 2022-03-26 PROCEDURE — 77067 SCR MAMMO BI INCL CAD: CPT | Performed by: OBSTETRICS & GYNECOLOGY

## 2022-07-21 DIAGNOSIS — F41.1 GENERALIZED ANXIETY DISORDER: ICD-10-CM

## 2022-07-21 RX ORDER — DIAZEPAM 5 MG/1
10 TABLET ORAL DAILY PRN
Qty: 30 TABLET | Refills: 0 | Status: SHIPPED | OUTPATIENT
Start: 2022-07-21

## 2022-08-02 ENCOUNTER — OFFICE VISIT (OUTPATIENT)
Dept: ENDOCRINOLOGY CLINIC | Facility: CLINIC | Age: 62
End: 2022-08-02
Payer: COMMERCIAL

## 2022-08-02 VITALS
HEIGHT: 66 IN | HEART RATE: 55 BPM | DIASTOLIC BLOOD PRESSURE: 81 MMHG | BODY MASS INDEX: 20.89 KG/M2 | SYSTOLIC BLOOD PRESSURE: 134 MMHG | WEIGHT: 130 LBS | RESPIRATION RATE: 16 BRPM

## 2022-08-02 DIAGNOSIS — Z86.39 HISTORY OF HYPOGLYCEMIA: ICD-10-CM

## 2022-08-02 DIAGNOSIS — F41.9 ANXIETY: ICD-10-CM

## 2022-08-02 DIAGNOSIS — R00.0 TACHYCARDIA: Primary | ICD-10-CM

## 2022-08-02 PROCEDURE — 3079F DIAST BP 80-89 MM HG: CPT | Performed by: INTERNAL MEDICINE

## 2022-08-02 PROCEDURE — 3075F SYST BP GE 130 - 139MM HG: CPT | Performed by: INTERNAL MEDICINE

## 2022-08-02 PROCEDURE — 3008F BODY MASS INDEX DOCD: CPT | Performed by: INTERNAL MEDICINE

## 2022-08-02 PROCEDURE — 99204 OFFICE O/P NEW MOD 45 MIN: CPT | Performed by: INTERNAL MEDICINE

## 2022-08-08 ENCOUNTER — PATIENT MESSAGE (OUTPATIENT)
Dept: ENDOCRINOLOGY CLINIC | Facility: CLINIC | Age: 62
End: 2022-08-08

## 2022-08-10 NOTE — TELEPHONE ENCOUNTER
Hi!  Can you find out if she specifically means Jose Francisco Dumont or is a PCP at [facility] is alright? If Jose Francisco Dumont, then I will have to wait until I come back into the office tomorrow. Thank you!

## 2022-08-17 ENCOUNTER — PATIENT MESSAGE (OUTPATIENT)
Dept: ENDOCRINOLOGY CLINIC | Facility: CLINIC | Age: 62
End: 2022-08-17

## 2022-08-17 ENCOUNTER — LAB ENCOUNTER (OUTPATIENT)
Dept: LAB | Age: 62
End: 2022-08-17
Attending: INTERNAL MEDICINE
Payer: COMMERCIAL

## 2022-08-17 DIAGNOSIS — R00.0 TACHYCARDIA: ICD-10-CM

## 2022-08-17 DIAGNOSIS — F41.9 ANXIETY: ICD-10-CM

## 2022-08-17 LAB
CREAT UR-SCNC: 1.04 G/24 HR (ref 0.6–1.8)
SPECIMEN VOL UR: 2075 ML

## 2022-08-17 PROCEDURE — 83835 ASSAY OF METANEPHRINES: CPT

## 2022-08-17 PROCEDURE — 82570 ASSAY OF URINE CREATININE: CPT

## 2022-08-17 PROCEDURE — 83497 ASSAY OF 5-HIAA: CPT

## 2022-08-20 LAB
5-HIAA URINE - PER 24H: 2 MG/D
5-HIAA URINE - PER VOLUME: 1.2 MG/L
5-HIAA URINE - RATIO TO CRT: 2 MG/GCR
CREATININE, URINE - PER 24H: 1058 MG/D
CREATININE, URINE - PER 24H: 1100 MG/D
CREATININE, URINE - PER VOLUME: 51 MG/DL
CREATININE, URINE - PER VOLUME: 53 MG/DL
HOURS COLLECTED: 24 HR
HOURS COLLECTED: 24 HR
METANEPHRINE, UR- RATIO TO CRT: 65 UG/G CRT
METANEPHRINE, URINE - PER 24H: 68 UG/D
METANEPHRINE, URINE-PER VOLUME: 33 UG/L
NORMETANEPHRINE, UR-PER VOLUME: 77 UG/L
NORMETANEPHRINE, URINE - RATIO: 151 UG/G CRT
NORMETANEPHRINE, URINE-PER 24H: 160 UG/D
TOTAL VOLUME: 2075 ML
TOTAL VOLUME: 2075 ML

## 2022-08-23 NOTE — TELEPHONE ENCOUNTER
Hi!    I know of one, Dr. Kaylene Hernandez. She is very good. Ask her to let me know if she is not able to get in to see her, I can suggest another. Thank you!

## 2022-09-08 ENCOUNTER — LAB ENCOUNTER (OUTPATIENT)
Dept: LAB | Age: 62
End: 2022-09-08
Attending: INTERNAL MEDICINE
Payer: COMMERCIAL

## 2022-09-08 DIAGNOSIS — R00.0 TACHYCARDIA: ICD-10-CM

## 2022-09-08 DIAGNOSIS — F41.9 ANXIETY: ICD-10-CM

## 2022-09-08 DIAGNOSIS — Z86.39 HISTORY OF HYPOGLYCEMIA: ICD-10-CM

## 2022-09-08 LAB
ALBUMIN SERPL-MCNC: 3.9 G/DL (ref 3.4–5)
ALBUMIN/GLOB SERPL: 1 {RATIO} (ref 1–2)
ALP LIVER SERPL-CCNC: 48 U/L
ALT SERPL-CCNC: 28 U/L
ANION GAP SERPL CALC-SCNC: 4 MMOL/L (ref 0–18)
AST SERPL-CCNC: 32 U/L (ref 15–37)
BILIRUB SERPL-MCNC: 0.8 MG/DL (ref 0.1–2)
BUN BLD-MCNC: 12 MG/DL (ref 7–18)
CALCIUM BLD-MCNC: 9.4 MG/DL (ref 8.5–10.1)
CHLORIDE SERPL-SCNC: 105 MMOL/L (ref 98–112)
CO2 SERPL-SCNC: 29 MMOL/L (ref 21–32)
CORTIS SERPL-MCNC: 7.8 UG/DL
CREAT BLD-MCNC: 0.93 MG/DL
FASTING STATUS PATIENT QL REPORTED: YES
GFR SERPLBLD BASED ON 1.73 SQ M-ARVRAT: 71 ML/MIN/1.73M2 (ref 60–?)
GLOBULIN PLAS-MCNC: 3.8 G/DL (ref 2.8–4.4)
GLUCOSE BLD-MCNC: 84 MG/DL (ref 70–99)
INSULIN SERPL-ACNC: 2.3 MU/L (ref 3–25)
OSMOLALITY SERPL CALC.SUM OF ELEC: 285 MOSM/KG (ref 275–295)
POTASSIUM SERPL-SCNC: 4.3 MMOL/L (ref 3.5–5.1)
PROT SERPL-MCNC: 7.7 G/DL (ref 6.4–8.2)
SODIUM SERPL-SCNC: 138 MMOL/L (ref 136–145)
T4 FREE SERPL-MCNC: 0.8 NG/DL (ref 0.8–1.7)
TSI SER-ACNC: 4.13 MIU/ML (ref 0.36–3.74)

## 2022-09-08 PROCEDURE — 84443 ASSAY THYROID STIM HORMONE: CPT

## 2022-09-08 PROCEDURE — 84206 ASSAY OF PROINSULIN: CPT

## 2022-09-08 PROCEDURE — 82533 TOTAL CORTISOL: CPT

## 2022-09-08 PROCEDURE — 83835 ASSAY OF METANEPHRINES: CPT

## 2022-09-08 PROCEDURE — 36415 COLL VENOUS BLD VENIPUNCTURE: CPT

## 2022-09-08 PROCEDURE — 83525 ASSAY OF INSULIN: CPT

## 2022-09-08 PROCEDURE — 84439 ASSAY OF FREE THYROXINE: CPT

## 2022-09-08 PROCEDURE — 80053 COMPREHEN METABOLIC PANEL: CPT

## 2022-09-08 PROCEDURE — 84681 ASSAY OF C-PEPTIDE: CPT

## 2022-09-08 PROCEDURE — 84305 ASSAY OF SOMATOMEDIN: CPT

## 2022-09-08 PROCEDURE — 82010 KETONE BODYS QUAN: CPT

## 2022-09-08 PROCEDURE — 82024 ASSAY OF ACTH: CPT

## 2022-09-10 LAB — BETA-HYDROXYBUTYRIC ACID: 1.3 MG/DL

## 2022-09-11 LAB
ADRENOCORTICOTROPIC HORMONE: 9.9 PG/ML
C-PEPTIDE, SERUM OR PLASMA: 0.9 NG/ML

## 2022-09-13 LAB
METANEPHRINE: 0.14 NMOL/L
NORMETANEPHRINE: 0.24 NMOL/L

## 2022-09-14 LAB — PROINSULIN: <1.6 PMOL/L

## 2022-09-16 LAB
IGF 1 Z SCORE CALCULATION: 0.3
IGF-1 (INSULINE-LIKE GROWTH FACTOR 1): 132 NG/ML

## 2022-11-01 PROBLEM — H91.90 HARD OF HEARING: Status: ACTIVE | Noted: 2022-11-01

## 2022-11-16 ENCOUNTER — LAB ENCOUNTER (OUTPATIENT)
Dept: LAB | Age: 62
End: 2022-11-16
Attending: INTERNAL MEDICINE
Payer: COMMERCIAL

## 2022-11-16 DIAGNOSIS — R73.9 HYPERGLYCEMIA: ICD-10-CM

## 2022-11-16 DIAGNOSIS — E78.2 MIXED HYPERLIPIDEMIA: ICD-10-CM

## 2022-11-16 DIAGNOSIS — Z00.00 ROUTINE GENERAL MEDICAL EXAMINATION AT A HEALTH CARE FACILITY: ICD-10-CM

## 2022-11-16 DIAGNOSIS — E55.9 VITAMIN D DEFICIENCY: ICD-10-CM

## 2022-11-16 DIAGNOSIS — M77.9 INFLAMMATION AROUND JOINT: ICD-10-CM

## 2022-11-16 DIAGNOSIS — D64.9 ANEMIA, UNSPECIFIED TYPE: ICD-10-CM

## 2022-11-16 DIAGNOSIS — E03.9 PRIMARY HYPOTHYROIDISM: ICD-10-CM

## 2022-11-16 DIAGNOSIS — M19.90 ARTHRITIS: ICD-10-CM

## 2022-11-16 LAB
ALBUMIN SERPL-MCNC: 3.8 G/DL (ref 3.4–5)
ALBUMIN/GLOB SERPL: 1.1 {RATIO} (ref 1–2)
ALP LIVER SERPL-CCNC: 47 U/L
ALT SERPL-CCNC: 24 U/L
ANION GAP SERPL CALC-SCNC: 4 MMOL/L (ref 0–18)
AST SERPL-CCNC: 20 U/L (ref 15–37)
BASOPHILS # BLD AUTO: 0.05 X10(3) UL (ref 0–0.2)
BASOPHILS NFR BLD AUTO: 1.3 %
BILIRUB SERPL-MCNC: 0.5 MG/DL (ref 0.1–2)
BUN BLD-MCNC: 12 MG/DL (ref 7–18)
CALCIUM BLD-MCNC: 9 MG/DL (ref 8.5–10.1)
CHLORIDE SERPL-SCNC: 110 MMOL/L (ref 98–112)
CHOLEST SERPL-MCNC: 226 MG/DL (ref ?–200)
CO2 SERPL-SCNC: 27 MMOL/L (ref 21–32)
CREAT BLD-MCNC: 0.74 MG/DL
CRP SERPL-MCNC: <0.29 MG/DL (ref ?–0.3)
EOSINOPHIL # BLD AUTO: 0.1 X10(3) UL (ref 0–0.7)
EOSINOPHIL NFR BLD AUTO: 2.5 %
ERYTHROCYTE [DISTWIDTH] IN BLOOD BY AUTOMATED COUNT: 11.8 %
ERYTHROCYTE [SEDIMENTATION RATE] IN BLOOD: 13 MM/HR
EST. AVERAGE GLUCOSE BLD GHB EST-MCNC: 108 MG/DL (ref 68–126)
FASTING PATIENT LIPID ANSWER: YES
FASTING STATUS PATIENT QL REPORTED: YES
GFR SERPLBLD BASED ON 1.73 SQ M-ARVRAT: 93 ML/MIN/1.73M2 (ref 60–?)
GLOBULIN PLAS-MCNC: 3.4 G/DL (ref 2.8–4.4)
GLUCOSE BLD-MCNC: 91 MG/DL (ref 70–99)
HBA1C MFR BLD: 5.4 % (ref ?–5.7)
HCT VFR BLD AUTO: 42.2 %
HDLC SERPL-MCNC: 96 MG/DL (ref 40–59)
HGB BLD-MCNC: 13.9 G/DL
IMM GRANULOCYTES # BLD AUTO: 0.01 X10(3) UL (ref 0–1)
IMM GRANULOCYTES NFR BLD: 0.3 %
LDLC SERPL CALC-MCNC: 118 MG/DL (ref ?–100)
LYMPHOCYTES # BLD AUTO: 1.35 X10(3) UL (ref 1–4)
LYMPHOCYTES NFR BLD AUTO: 33.8 %
MCH RBC QN AUTO: 34.2 PG (ref 26–34)
MCHC RBC AUTO-ENTMCNC: 32.9 G/DL (ref 31–37)
MCV RBC AUTO: 103.7 FL
MONOCYTES # BLD AUTO: 0.32 X10(3) UL (ref 0.1–1)
MONOCYTES NFR BLD AUTO: 8 %
NEUTROPHILS # BLD AUTO: 2.16 X10 (3) UL (ref 1.5–7.7)
NEUTROPHILS # BLD AUTO: 2.16 X10(3) UL (ref 1.5–7.7)
NEUTROPHILS NFR BLD AUTO: 54.1 %
NONHDLC SERPL-MCNC: 130 MG/DL (ref ?–130)
OSMOLALITY SERPL CALC.SUM OF ELEC: 291 MOSM/KG (ref 275–295)
PLATELET # BLD AUTO: 259 10(3)UL (ref 150–450)
POTASSIUM SERPL-SCNC: 4.5 MMOL/L (ref 3.5–5.1)
PROT SERPL-MCNC: 7.2 G/DL (ref 6.4–8.2)
RBC # BLD AUTO: 4.07 X10(6)UL
RHEUMATOID FACT SERPL-ACNC: <10 IU/ML (ref ?–15)
SODIUM SERPL-SCNC: 141 MMOL/L (ref 136–145)
TRIGL SERPL-MCNC: 67 MG/DL (ref 30–149)
TSI SER-ACNC: 3.45 MIU/ML (ref 0.36–3.74)
VIT D+METAB SERPL-MCNC: 37.7 NG/ML (ref 30–100)
VLDLC SERPL CALC-MCNC: 12 MG/DL (ref 0–30)
WBC # BLD AUTO: 4 X10(3) UL (ref 4–11)

## 2022-11-16 PROCEDURE — 86200 CCP ANTIBODY: CPT

## 2022-11-16 PROCEDURE — 86038 ANTINUCLEAR ANTIBODIES: CPT

## 2022-11-16 PROCEDURE — 36415 COLL VENOUS BLD VENIPUNCTURE: CPT

## 2022-11-16 PROCEDURE — 80053 COMPREHEN METABOLIC PANEL: CPT

## 2022-11-16 PROCEDURE — 85652 RBC SED RATE AUTOMATED: CPT

## 2022-11-16 PROCEDURE — 82728 ASSAY OF FERRITIN: CPT | Performed by: INTERNAL MEDICINE

## 2022-11-16 PROCEDURE — 84443 ASSAY THYROID STIM HORMONE: CPT

## 2022-11-16 PROCEDURE — 83550 IRON BINDING TEST: CPT | Performed by: INTERNAL MEDICINE

## 2022-11-16 PROCEDURE — 82306 VITAMIN D 25 HYDROXY: CPT

## 2022-11-16 PROCEDURE — 86225 DNA ANTIBODY NATIVE: CPT

## 2022-11-16 PROCEDURE — 86431 RHEUMATOID FACTOR QUANT: CPT

## 2022-11-16 PROCEDURE — 80061 LIPID PANEL: CPT

## 2022-11-16 PROCEDURE — 83540 ASSAY OF IRON: CPT | Performed by: INTERNAL MEDICINE

## 2022-11-16 PROCEDURE — 85025 COMPLETE CBC W/AUTO DIFF WBC: CPT

## 2022-11-16 PROCEDURE — 83036 HEMOGLOBIN GLYCOSYLATED A1C: CPT

## 2022-11-16 PROCEDURE — 86140 C-REACTIVE PROTEIN: CPT

## 2022-11-18 LAB
CCP IGG SERPL-ACNC: 0.6 U/ML (ref 0–6.9)
DSDNA IGG SERPL IA-ACNC: 8.9 IU/ML
ENA AB SER QL IA: 0.1 UG/L
ENA AB SER QL IA: NEGATIVE

## 2023-02-23 NOTE — TELEPHONE ENCOUNTER
Tell her I sent a 30-day supply of the Bystolic to her pharmacy. Gume Vizcarra. Bentley Moran MD  Diplomate, American Board of Internal Medicine  [facility]  [address] [facility], [address], [facility], [address]  T: [phone]; F: [place] [address] Gabapentin Counseling: I discussed with the patient the risks of gabapentin including but not limited to dizziness, somnolence, fatigue and ataxia.

## 2023-05-11 ENCOUNTER — LAB ENCOUNTER (OUTPATIENT)
Dept: LAB | Age: 63
End: 2023-05-11
Attending: INTERNAL MEDICINE
Payer: COMMERCIAL

## 2023-05-11 DIAGNOSIS — D64.9 ANEMIA, UNSPECIFIED TYPE: ICD-10-CM

## 2023-05-11 DIAGNOSIS — E03.9 PRIMARY HYPOTHYROIDISM: ICD-10-CM

## 2023-05-11 DIAGNOSIS — E78.2 MIXED HYPERLIPIDEMIA: ICD-10-CM

## 2023-05-11 DIAGNOSIS — Z00.00 ROUTINE GENERAL MEDICAL EXAMINATION AT A HEALTH CARE FACILITY: ICD-10-CM

## 2023-05-11 DIAGNOSIS — R73.9 HYPERGLYCEMIA: ICD-10-CM

## 2023-05-11 LAB
ALBUMIN SERPL-MCNC: 3.9 G/DL (ref 3.4–5)
ALBUMIN/GLOB SERPL: 1.3 {RATIO} (ref 1–2)
ALP LIVER SERPL-CCNC: 46 U/L
ALT SERPL-CCNC: 21 U/L
ANION GAP SERPL CALC-SCNC: 2 MMOL/L (ref 0–18)
AST SERPL-CCNC: 24 U/L (ref 15–37)
BASOPHILS # BLD AUTO: 0.03 X10(3) UL (ref 0–0.2)
BASOPHILS NFR BLD AUTO: 0.9 %
BILIRUB SERPL-MCNC: 1 MG/DL (ref 0.1–2)
BUN BLD-MCNC: 10 MG/DL (ref 7–18)
CALCIUM BLD-MCNC: 8.6 MG/DL (ref 8.5–10.1)
CHLORIDE SERPL-SCNC: 108 MMOL/L (ref 98–112)
CHOLEST SERPL-MCNC: 205 MG/DL (ref ?–200)
CO2 SERPL-SCNC: 26 MMOL/L (ref 21–32)
CREAT BLD-MCNC: 0.77 MG/DL
DEPRECATED HBV CORE AB SER IA-ACNC: 63 NG/ML
EOSINOPHIL # BLD AUTO: 0.1 X10(3) UL (ref 0–0.7)
EOSINOPHIL NFR BLD AUTO: 2.9 %
ERYTHROCYTE [DISTWIDTH] IN BLOOD BY AUTOMATED COUNT: 11.6 %
EST. AVERAGE GLUCOSE BLD GHB EST-MCNC: 97 MG/DL (ref 68–126)
FASTING PATIENT LIPID ANSWER: YES
FASTING STATUS PATIENT QL REPORTED: YES
GFR SERPLBLD BASED ON 1.73 SQ M-ARVRAT: 88 ML/MIN/1.73M2 (ref 60–?)
GLOBULIN PLAS-MCNC: 3.1 G/DL (ref 2.8–4.4)
GLUCOSE BLD-MCNC: 88 MG/DL (ref 70–99)
HBA1C MFR BLD: 5 % (ref ?–5.7)
HCT VFR BLD AUTO: 42.3 %
HDLC SERPL-MCNC: 104 MG/DL (ref 40–59)
HGB BLD-MCNC: 14.1 G/DL
IMM GRANULOCYTES # BLD AUTO: 0.01 X10(3) UL (ref 0–1)
IMM GRANULOCYTES NFR BLD: 0.3 %
IRON SATN MFR SERPL: 25 %
IRON SERPL-MCNC: 82 UG/DL
LDLC SERPL CALC-MCNC: 92 MG/DL (ref ?–100)
LYMPHOCYTES # BLD AUTO: 1.32 X10(3) UL (ref 1–4)
LYMPHOCYTES NFR BLD AUTO: 38.8 %
MCH RBC QN AUTO: 33.8 PG (ref 26–34)
MCHC RBC AUTO-ENTMCNC: 33.3 G/DL (ref 31–37)
MCV RBC AUTO: 101.4 FL
MONOCYTES # BLD AUTO: 0.31 X10(3) UL (ref 0.1–1)
MONOCYTES NFR BLD AUTO: 9.1 %
NEUTROPHILS # BLD AUTO: 1.63 X10 (3) UL (ref 1.5–7.7)
NEUTROPHILS # BLD AUTO: 1.63 X10(3) UL (ref 1.5–7.7)
NEUTROPHILS NFR BLD AUTO: 48 %
NONHDLC SERPL-MCNC: 101 MG/DL (ref ?–130)
OSMOLALITY SERPL CALC.SUM OF ELEC: 280 MOSM/KG (ref 275–295)
PLATELET # BLD AUTO: 238 10(3)UL (ref 150–450)
POTASSIUM SERPL-SCNC: 3.9 MMOL/L (ref 3.5–5.1)
PROT SERPL-MCNC: 7 G/DL (ref 6.4–8.2)
RBC # BLD AUTO: 4.17 X10(6)UL
SODIUM SERPL-SCNC: 136 MMOL/L (ref 136–145)
TIBC SERPL-MCNC: 322 UG/DL (ref 240–450)
TRANSFERRIN SERPL-MCNC: 216 MG/DL (ref 200–360)
TRIGL SERPL-MCNC: 45 MG/DL (ref 30–149)
TSI SER-ACNC: 1.84 MIU/ML (ref 0.36–3.74)
VLDLC SERPL CALC-MCNC: 7 MG/DL (ref 0–30)
WBC # BLD AUTO: 3.4 X10(3) UL (ref 4–11)

## 2023-05-11 PROCEDURE — 83540 ASSAY OF IRON: CPT

## 2023-05-11 PROCEDURE — 80053 COMPREHEN METABOLIC PANEL: CPT

## 2023-05-11 PROCEDURE — 82728 ASSAY OF FERRITIN: CPT

## 2023-05-11 PROCEDURE — 84443 ASSAY THYROID STIM HORMONE: CPT

## 2023-05-11 PROCEDURE — 36415 COLL VENOUS BLD VENIPUNCTURE: CPT

## 2023-05-11 PROCEDURE — 80061 LIPID PANEL: CPT

## 2023-05-11 PROCEDURE — 85025 COMPLETE CBC W/AUTO DIFF WBC: CPT

## 2023-05-11 PROCEDURE — 83036 HEMOGLOBIN GLYCOSYLATED A1C: CPT

## 2023-05-11 PROCEDURE — 83550 IRON BINDING TEST: CPT

## 2023-05-11 NOTE — PROGRESS NOTES
I will discuss results with the patient in person at the next visit- Appt scheduled - date verified by me.  Shellie Gustafson,

## 2023-05-25 PROBLEM — R87.629 ABNORMAL PAPANICOLAOU SMEAR OF VAGINA: Status: ACTIVE | Noted: 2023-05-25

## 2023-06-14 PROCEDURE — 87624 HPV HI-RISK TYP POOLED RSLT: CPT | Performed by: CLINICAL MEDICAL LABORATORY

## 2023-06-14 PROCEDURE — 88175 CYTOPATH C/V AUTO FLUID REDO: CPT | Performed by: CLINICAL MEDICAL LABORATORY

## 2023-06-15 ENCOUNTER — LAB REQUISITION (OUTPATIENT)
Dept: LAB | Age: 63
End: 2023-06-15

## 2023-06-15 DIAGNOSIS — R87.622 LOW GRADE SQUAMOUS INTRAEPITHELIAL LESION ON CYTOLOGIC SMEAR OF VAGINA (LGSIL): ICD-10-CM

## 2023-06-15 DIAGNOSIS — Z12.72 ENCOUNTER FOR SCREENING FOR MALIGNANT NEOPLASM OF VAGINA: ICD-10-CM

## 2023-06-20 LAB
CASE RPRT: ABNORMAL
CLINICAL INFO: ABNORMAL
CYTOLOGY CVX/VAG STUDY: ABNORMAL
CYTOLOGY CVX/VAG STUDY: ABNORMAL
GEN CATEG CVX/VAG CYTO-IMP: ABNORMAL
HPV16+18+45 E6+E7MRNA CVX NAA+PROBE: POSITIVE
Lab: ABNORMAL
PAP EDUCATIONAL NOTE: ABNORMAL
SPECIMEN ADEQUACY: ABNORMAL

## 2023-07-07 ENCOUNTER — HOSPITAL ENCOUNTER (OUTPATIENT)
Dept: BONE DENSITY | Age: 63
Discharge: HOME | End: 2023-07-07
Attending: OBSTETRICS & GYNECOLOGY
Payer: COMMERCIAL

## 2023-07-07 DIAGNOSIS — Z13.820 SCREENING FOR OSTEOPOROSIS: ICD-10-CM

## 2023-07-07 PROCEDURE — 77080 DXA BONE DENSITY AXIAL: CPT | Performed by: OBSTETRICS & GYNECOLOGY

## 2023-08-21 ENCOUNTER — LAB ENCOUNTER (OUTPATIENT)
Dept: LAB | Age: 63
End: 2023-08-21
Attending: INTERNAL MEDICINE
Payer: COMMERCIAL

## 2023-08-21 DIAGNOSIS — N95.9 MENOPAUSAL PROBLEM: Primary | ICD-10-CM

## 2023-08-21 LAB
ESTRADIOL SERPL-MCNC: 75.8 PG/ML
FSH SERPL-ACNC: 20.6 MIU/ML

## 2023-08-21 PROCEDURE — 82670 ASSAY OF TOTAL ESTRADIOL: CPT

## 2023-08-21 PROCEDURE — 83001 ASSAY OF GONADOTROPIN (FSH): CPT

## 2023-08-21 PROCEDURE — 84410 TESTOSTERONE BIOAVAILABLE: CPT

## 2023-08-25 LAB
SEX HORM BIND GLOB: 87.3 NMOL/L
TESTOST % FREE+WEAK BND: 9.7 %
TESTOST FREE+WEAK BND: 35.7 NG/DL
TESTOSTERONE TOT /MS: 368.1 NG/DL

## 2023-09-16 ENCOUNTER — HOSPITAL ENCOUNTER (OUTPATIENT)
Dept: MAMMOGRAPHY | Age: 63
Discharge: HOME | End: 2023-09-16
Attending: OBSTETRICS & GYNECOLOGY
Payer: COMMERCIAL

## 2023-09-16 DIAGNOSIS — Z12.31 SCREENING MAMMOGRAM FOR BREAST CANCER: ICD-10-CM

## 2023-09-16 PROCEDURE — 77067 SCR MAMMO BI INCL CAD: CPT | Performed by: OBSTETRICS & GYNECOLOGY

## 2023-09-16 PROCEDURE — 77063 BREAST TOMOSYNTHESIS BI: CPT | Performed by: OBSTETRICS & GYNECOLOGY

## 2023-09-30 ENCOUNTER — HOSPITAL ENCOUNTER (OUTPATIENT)
Dept: MRI IMAGING | Age: 63
Discharge: HOME | End: 2023-09-30
Attending: ORTHOPAEDIC SURGERY
Payer: COMMERCIAL

## 2023-09-30 DIAGNOSIS — M84.30XA STRESS FRACTURE: ICD-10-CM

## 2023-09-30 DIAGNOSIS — M77.01 MEDIAL EPICONDYLITIS OF RIGHT ELBOW: ICD-10-CM

## 2023-09-30 PROCEDURE — 73221 MRI JOINT UPR EXTREM W/O DYE: CPT | Performed by: ORTHOPAEDIC SURGERY

## 2023-12-13 ENCOUNTER — LAB ENCOUNTER (OUTPATIENT)
Dept: LAB | Age: 63
End: 2023-12-13
Attending: INTERNAL MEDICINE
Payer: COMMERCIAL

## 2023-12-13 DIAGNOSIS — R09.81 SINUS CONGESTION: ICD-10-CM

## 2023-12-13 DIAGNOSIS — R50.9 FEVER, UNSPECIFIED FEVER CAUSE: ICD-10-CM

## 2023-12-13 PROCEDURE — 87637 SARSCOV2&INF A&B&RSV AMP PRB: CPT

## 2023-12-14 LAB
FLUAV + FLUBV RNA SPEC NAA+PROBE: NOT DETECTED
FLUAV + FLUBV RNA SPEC NAA+PROBE: NOT DETECTED
RSV RNA SPEC NAA+PROBE: DETECTED
SARS-COV-2 RNA RESP QL NAA+PROBE: NOT DETECTED

## 2023-12-16 ENCOUNTER — OFFICE VISIT (OUTPATIENT)
Dept: FAMILY MEDICINE CLINIC | Facility: CLINIC | Age: 63
End: 2023-12-16
Payer: COMMERCIAL

## 2023-12-16 VITALS
HEIGHT: 66 IN | OXYGEN SATURATION: 98 % | BODY MASS INDEX: 20.89 KG/M2 | WEIGHT: 130 LBS | HEART RATE: 82 BPM | DIASTOLIC BLOOD PRESSURE: 82 MMHG | TEMPERATURE: 99 F | SYSTOLIC BLOOD PRESSURE: 124 MMHG

## 2023-12-16 DIAGNOSIS — B33.8 RSV (RESPIRATORY SYNCYTIAL VIRUS INFECTION): Primary | ICD-10-CM

## 2023-12-16 PROCEDURE — 99213 OFFICE O/P EST LOW 20 MIN: CPT | Performed by: NURSE PRACTITIONER

## 2023-12-16 PROCEDURE — 3008F BODY MASS INDEX DOCD: CPT | Performed by: NURSE PRACTITIONER

## 2023-12-16 PROCEDURE — 3074F SYST BP LT 130 MM HG: CPT | Performed by: NURSE PRACTITIONER

## 2023-12-16 PROCEDURE — 3079F DIAST BP 80-89 MM HG: CPT | Performed by: NURSE PRACTITIONER

## 2023-12-16 RX ORDER — BENZONATATE 200 MG/1
200 CAPSULE ORAL 3 TIMES DAILY PRN
Qty: 20 CAPSULE | Refills: 0 | Status: SHIPPED | OUTPATIENT
Start: 2023-12-16

## 2023-12-16 RX ORDER — ALBUTEROL SULFATE 90 UG/1
2 AEROSOL, METERED RESPIRATORY (INHALATION) EVERY 4 HOURS PRN
Qty: 1 EACH | Refills: 0 | Status: SHIPPED | OUTPATIENT
Start: 2023-12-16

## 2023-12-20 PROCEDURE — 87591 N.GONORRHOEAE DNA AMP PROB: CPT | Performed by: CLINICAL MEDICAL LABORATORY

## 2023-12-20 PROCEDURE — 88175 CYTOPATH C/V AUTO FLUID REDO: CPT | Performed by: CLINICAL MEDICAL LABORATORY

## 2023-12-20 PROCEDURE — 87491 CHLMYD TRACH DNA AMP PROBE: CPT | Performed by: CLINICAL MEDICAL LABORATORY

## 2023-12-20 PROCEDURE — 87624 HPV HI-RISK TYP POOLED RSLT: CPT | Performed by: CLINICAL MEDICAL LABORATORY

## 2023-12-21 ENCOUNTER — LAB REQUISITION (OUTPATIENT)
Dept: LAB | Age: 63
End: 2023-12-21

## 2023-12-21 ENCOUNTER — LAB ENCOUNTER (OUTPATIENT)
Dept: LAB | Age: 63
End: 2023-12-21
Attending: INTERNAL MEDICINE
Payer: COMMERCIAL

## 2023-12-21 DIAGNOSIS — R73.9 HYPERGLYCEMIA: ICD-10-CM

## 2023-12-21 DIAGNOSIS — Z11.3 SCREENING EXAMINATION FOR VENEREAL DISEASE: Primary | ICD-10-CM

## 2023-12-21 DIAGNOSIS — Z11.4 SCREENING FOR HUMAN IMMUNODEFICIENCY VIRUS: ICD-10-CM

## 2023-12-21 DIAGNOSIS — R87.622 LOW GRADE SQUAMOUS INTRAEPITHELIAL LESION ON CYTOLOGIC SMEAR OF VAGINA (LGSIL): ICD-10-CM

## 2023-12-21 DIAGNOSIS — Z00.00 ROUTINE GENERAL MEDICAL EXAMINATION AT A HEALTH CARE FACILITY: ICD-10-CM

## 2023-12-21 DIAGNOSIS — E78.2 MIXED HYPERLIPIDEMIA: ICD-10-CM

## 2023-12-21 DIAGNOSIS — E03.9 PRIMARY HYPOTHYROIDISM: ICD-10-CM

## 2023-12-21 DIAGNOSIS — Z11.59 SPECIAL SCREENING EXAMINATION FOR VIRAL DISEASE: ICD-10-CM

## 2023-12-21 DIAGNOSIS — D64.9 ANEMIA, UNSPECIFIED TYPE: ICD-10-CM

## 2023-12-21 DIAGNOSIS — Z92.89 PERSONAL HISTORY OF OTHER MEDICAL TREATMENT: ICD-10-CM

## 2023-12-21 LAB
ALBUMIN SERPL-MCNC: 3.3 G/DL (ref 3.4–5)
ALBUMIN/GLOB SERPL: 0.8 {RATIO} (ref 1–2)
ALP LIVER SERPL-CCNC: 60 U/L
ALT SERPL-CCNC: 16 U/L
ANION GAP SERPL CALC-SCNC: 3 MMOL/L (ref 0–18)
AST SERPL-CCNC: 13 U/L (ref 15–37)
BASOPHILS # BLD AUTO: 0.07 X10(3) UL (ref 0–0.2)
BASOPHILS NFR BLD AUTO: 0.8 %
BILIRUB SERPL-MCNC: 0.5 MG/DL (ref 0.1–2)
BUN BLD-MCNC: 11 MG/DL (ref 9–23)
C TRACH RRNA SPEC QL NAA+PROBE: NEGATIVE
CALCIUM BLD-MCNC: 8.7 MG/DL (ref 8.5–10.1)
CHLORIDE SERPL-SCNC: 106 MMOL/L (ref 98–112)
CHOLEST SERPL-MCNC: 154 MG/DL (ref ?–200)
CO2 SERPL-SCNC: 30 MMOL/L (ref 21–32)
CREAT BLD-MCNC: 0.85 MG/DL
EGFRCR SERPLBLD CKD-EPI 2021: 78 ML/MIN/1.73M2 (ref 60–?)
EOSINOPHIL # BLD AUTO: 0.14 X10(3) UL (ref 0–0.7)
EOSINOPHIL NFR BLD AUTO: 1.5 %
ERYTHROCYTE [DISTWIDTH] IN BLOOD BY AUTOMATED COUNT: 11.1 %
EST. AVERAGE GLUCOSE BLD GHB EST-MCNC: 103 MG/DL (ref 68–126)
FASTING PATIENT LIPID ANSWER: YES
FASTING STATUS PATIENT QL REPORTED: YES
GLOBULIN PLAS-MCNC: 4.1 G/DL (ref 2.8–4.4)
GLUCOSE BLD-MCNC: 90 MG/DL (ref 70–99)
HAV IGM SER QL: NONREACTIVE
HBA1C MFR BLD: 5.2 % (ref ?–5.7)
HBV CORE IGM SER QL: NONREACTIVE
HCT VFR BLD AUTO: 38.7 %
HCV AB SERPL QL IA: NONREACTIVE
HDLC SERPL-MCNC: 55 MG/DL (ref 40–59)
HGB BLD-MCNC: 12.6 G/DL
IMM GRANULOCYTES # BLD AUTO: 0.13 X10(3) UL (ref 0–1)
IMM GRANULOCYTES NFR BLD: 1.4 %
LDLC SERPL CALC-MCNC: 87 MG/DL (ref ?–100)
LYMPHOCYTES # BLD AUTO: 1.34 X10(3) UL (ref 1–4)
LYMPHOCYTES NFR BLD AUTO: 14.6 %
Lab: NORMAL
MCH RBC QN AUTO: 33.8 PG (ref 26–34)
MCHC RBC AUTO-ENTMCNC: 32.6 G/DL (ref 31–37)
MCV RBC AUTO: 103.8 FL
MONOCYTES # BLD AUTO: 0.69 X10(3) UL (ref 0.1–1)
MONOCYTES NFR BLD AUTO: 7.5 %
N GONORRHOEA RRNA SPEC QL NAA+PROBE: NEGATIVE
NEUTROPHILS # BLD AUTO: 6.8 X10 (3) UL (ref 1.5–7.7)
NEUTROPHILS # BLD AUTO: 6.8 X10(3) UL (ref 1.5–7.7)
NEUTROPHILS NFR BLD AUTO: 74.2 %
NONHDLC SERPL-MCNC: 99 MG/DL (ref ?–130)
OSMOLALITY SERPL CALC.SUM OF ELEC: 287 MOSM/KG (ref 275–295)
PLATELET # BLD AUTO: 407 10(3)UL (ref 150–450)
POTASSIUM SERPL-SCNC: 4.2 MMOL/L (ref 3.5–5.1)
PROT SERPL-MCNC: 7.4 G/DL (ref 6.4–8.2)
RBC # BLD AUTO: 3.73 X10(6)UL
SODIUM SERPL-SCNC: 139 MMOL/L (ref 136–145)
TRIGL SERPL-MCNC: 57 MG/DL (ref 30–149)
TSI SER-ACNC: 1.63 MIU/ML (ref 0.36–3.74)
VLDLC SERPL CALC-MCNC: 9 MG/DL (ref 0–30)
WBC # BLD AUTO: 9.2 X10(3) UL (ref 4–11)

## 2023-12-21 PROCEDURE — 80053 COMPREHEN METABOLIC PANEL: CPT

## 2023-12-21 PROCEDURE — 86592 SYPHILIS TEST NON-TREP QUAL: CPT

## 2023-12-21 PROCEDURE — 86705 HEP B CORE ANTIBODY IGM: CPT

## 2023-12-21 PROCEDURE — 86709 HEPATITIS A IGM ANTIBODY: CPT

## 2023-12-21 PROCEDURE — 86695 HERPES SIMPLEX TYPE 1 TEST: CPT

## 2023-12-21 PROCEDURE — 87591 N.GONORRHOEAE DNA AMP PROB: CPT

## 2023-12-21 PROCEDURE — 84443 ASSAY THYROID STIM HORMONE: CPT

## 2023-12-21 PROCEDURE — 86696 HERPES SIMPLEX TYPE 2 TEST: CPT

## 2023-12-21 PROCEDURE — 85025 COMPLETE CBC W/AUTO DIFF WBC: CPT

## 2023-12-21 PROCEDURE — 87491 CHLMYD TRACH DNA AMP PROBE: CPT

## 2023-12-21 PROCEDURE — 86803 HEPATITIS C AB TEST: CPT

## 2023-12-21 PROCEDURE — 36415 COLL VENOUS BLD VENIPUNCTURE: CPT

## 2023-12-21 PROCEDURE — 87389 HIV-1 AG W/HIV-1&-2 AB AG IA: CPT

## 2023-12-21 PROCEDURE — 83036 HEMOGLOBIN GLYCOSYLATED A1C: CPT

## 2023-12-21 PROCEDURE — 80061 LIPID PANEL: CPT

## 2023-12-22 LAB
C TRACH DNA SPEC QL NAA+PROBE: NEGATIVE
HSV 1 GLYCOPROTEIN G, IGG: POSITIVE
HSV 2 GLYCOPROTEIN G, IGG: NEGATIVE
N GONORRHOEA DNA SPEC QL NAA+PROBE: NEGATIVE
RPR SER QL: NONREACTIVE

## 2023-12-28 LAB
CASE RPRT: ABNORMAL
CYTOLOGY CVX/VAG STUDY: ABNORMAL
CYTOLOGY CVX/VAG STUDY: ABNORMAL
GEN CATEG CVX/VAG CYTO-IMP: ABNORMAL
HPV16+18+45 E6+E7MRNA CVX NAA+PROBE: POSITIVE
Lab: ABNORMAL
PAP EDUCATIONAL NOTE: ABNORMAL
SPECIMEN ADEQUACY: ABNORMAL

## 2024-05-01 ENCOUNTER — HOSPITAL ENCOUNTER (OUTPATIENT)
Dept: GENERAL RADIOLOGY | Age: 64
Discharge: HOME | End: 2024-05-01
Attending: PODIATRIST
Payer: COMMERCIAL

## 2024-05-01 ENCOUNTER — OFFICE VISIT (OUTPATIENT)
Dept: ORTHOPEDICS CLINIC | Facility: CLINIC | Age: 64
End: 2024-05-01
Payer: COMMERCIAL

## 2024-05-01 VITALS — BODY MASS INDEX: 20.57 KG/M2 | WEIGHT: 128 LBS | HEIGHT: 66 IN

## 2024-05-01 DIAGNOSIS — M79.672 LEFT FOOT PAIN: Primary | ICD-10-CM

## 2024-05-01 DIAGNOSIS — M77.52 LEFT CALCANEAL BURSITIS: ICD-10-CM

## 2024-05-01 DIAGNOSIS — M72.2 PLANTAR FASCIITIS: ICD-10-CM

## 2024-05-01 DIAGNOSIS — M79.672 PAIN OF LEFT HEEL: ICD-10-CM

## 2024-05-01 DIAGNOSIS — M79.672 LEFT FOOT PAIN: ICD-10-CM

## 2024-05-01 DIAGNOSIS — M79.675 TOE PAIN, LEFT: ICD-10-CM

## 2024-05-01 PROCEDURE — 99204 OFFICE O/P NEW MOD 45 MIN: CPT | Performed by: PODIATRIST

## 2024-05-01 PROCEDURE — 3008F BODY MASS INDEX DOCD: CPT | Performed by: PODIATRIST

## 2024-05-01 RX ORDER — MELOXICAM 7.5 MG/1
7.5 TABLET ORAL DAILY
Qty: 15 TABLET | Refills: 1 | Status: SHIPPED | OUTPATIENT
Start: 2024-05-01

## 2024-05-01 NOTE — PROGRESS NOTES
[facility] New Patient Note    CC:   Chief Complaint   Patient presents with    Foot Pain     Left heel pain, Onset: 6-8wks ago  Left pinky toe, Onset: 10.2023 after hitting it on a rock  Pain: 8/10  Sz: 10       HPI: The patient is a 63 year old female who presents today with complaints of left heel pain for about 6 weeks or so  Hx of PF years ago  Pain more in heel this time, bottom of heel  Hurts more after standing  In AM out of bed - less pain with a splint  Injury? Was in [place], slept off edge of pool-could be related      Stubbed 5th toe back in October - still feels funny  Xrays were negative at the time      Past Medical History:    Essential hypertension    Thyroid disease     Past Surgical History:   Procedure Laterality Date          2    Cholecystectomy      Hysterectomy  10/2018    Other surgical history      Carpal tunnel right hand     Other surgical history      Bilateral knee surgery     Other surgical history      Oral tooth extraction surgery     Other surgical history Bilateral     Breast implantation     Current Outpatient Medications   Medication Sig Dispense Refill    albuterol (PROAIR HFA) 108 (90 Base) MCG/ACT Inhalation Aero Soln Inhale 2 puffs into the lungs every 4 (four) hours as needed. 1 each 0    benzonatate 200 MG Oral Cap Take 1 capsule (200 mg total) by mouth 3 (three) times daily as needed for cough. 20 capsule 0    diazePAM 5 MG Oral Tab Take 1 tablet (5 mg total) by mouth daily as needed for Anxiety. 30 tablet 5    zolpidem 5 MG Oral Tab Take 1 tablet (5 mg total) by mouth nightly as needed. TAKE AT BEDTIME 30 tablet 5    losartan 25 MG Oral Tab Take 1 tablet (25 mg total) by mouth every morning. 90 tablet 1    valACYclovir 500 MG Oral Tab Take 500 mg by mouth 2 (two) times daily.       Allergies   Allergen Reactions    Dust Mites Runny nose and Tightness in Throat     Family History   Problem Relation Age of Onset    Hypertension Father     Stroke Father      Other (Parkinson's Dosease) Father     Other (Alcoholism) Father     Other (gout) Father     Other (Ankylosing spondylitis ) Father     Cancer Mother     Hypertension Mother     Heart Disorder Mother         Heart Attack Hx    Hypertension Sister     Hypertension Brother     Stroke Paternal Grandmother     Other (Ruptured cerebral aneurysm) Paternal Grandfather      Social History     Occupational History    Not on file   Tobacco Use    Smoking status: Never    Smokeless tobacco: Never   Vaping Use    Vaping status: Never Used   Substance and Sexual Activity    Alcohol use: Yes     Alcohol/week: 5.0 standard drinks of alcohol     Types: 5 Glasses of wine per week    Drug use: No    Sexual activity: Not on file        ROS:  Complete ROS reviewed by me and non-contributory to the chief complaint except as mentioned above.    Physical Exam:    Ht 5' 6\" (1.676 m)   Wt 128 lb (58.1 kg)   LMP 06/22/2016 (Approximate)   BMI 20.66 kg/m²       5th toe left foot - mild tender at pipj and prox phalanx  No deformity of toe, no hammertoe, no swelling    Left heel - Pt has pain at plantar fascial insertion area of heel.  No pain with side to side compression of calcaneus.  No swelling.  Negative tinel's sign at medial ankle.    Mild palpable painful plantar bursae as well.  Flexible heel pad  Tendonachilles tight  Sensation is intact sharp versus dull.  She can feel light touch to the tips of the toes  Palpable pedal pulses.  Hair growth is present.  Skin is supple and feet are well perfused and warm.    Strength is 5 out of 5 all muscle groups    Full range of motion and nonpainful motion of the ankle joint, subtalar joint, MP joints, and midfoot joints.     Imaging:  await xrays outside facility - too backed up today     personally viewed, independently interpreted and radiology report read.      Assessment/Diagnoses:  Diagnoses and all orders for this visit:    Left foot pain  -     XR FOOT WEIGHTBEARING (3 VIEWS), LEFT  (CPT=73630); Future    Pain of left heel  -     XR FOOT WEIGHTBEARING (3 VIEWS), LEFT (CPT=73630); Future        Plan:  I reviewed imaging and exam findings with the patient.  She has some plantar fascitis related likely to the injury  Also bursitis of heel as well  Condition of plantar fascitis (etiology, anatomy) and treatment plan were discussed, including stretching, ice, massage, good shoegear, arch support and consideration of a series of 3 steroid injections.  Physical therapy also discussed.  Consider custom orthotics.    Await x-ray results before doing an injection.  She may get the x-rays in the next week at an outside facility because it was too busy here today    Start PT  LE eval and treat  Modalities, graston and dry needling if available  Gait eval     Rx meloxicam 7.5 mg  With food in the morning.  Discontinue if any side effects.  Short course for 2 weeks          As far as 5th toe -she now has no deformity but still has discomfort.  Discussed the fact that sometimes the fifth toe continues to hurt for almost 1 year after an injury because it is on the outside of the foot and rubs on all the shoes.  Be mindful of the shoes she wears and also discussed the type of shoes she should be in for her foot type and working out.  Arch support and also heel cushioning.    Will await the x-rays to see if she has a history of a fracture of the fifth toe.  Small likelihood of hammertoe developing which could be taken care of surgically at a later date.  I do not think this will be the case from my exam.    Chelly Neely, [facility]  [facility]  [address], [address] [place] [address]   [address] [place] [address]   [address] [place] [address]  [email or web address]  [email or web address]  t: [phone]   f: [phone]              This document was partially prepared using Dragon Medical voice recognition software.

## 2024-05-14 ENCOUNTER — HOSPITAL ENCOUNTER (OUTPATIENT)
Dept: GENERAL RADIOLOGY | Age: 64
Discharge: HOME | End: 2024-05-14
Attending: PODIATRIST

## 2024-05-14 PROCEDURE — 73630 X-RAY EXAM OF FOOT: CPT | Performed by: PODIATRIST

## 2024-05-23 ENCOUNTER — TELEPHONE (OUTPATIENT)
Dept: ORTHOPEDICS CLINIC | Facility: CLINIC | Age: 64
End: 2024-05-23

## 2024-05-23 NOTE — TELEPHONE ENCOUNTER
Patient recently had her xray done and was calling about the results.  Patient also has a couple more weeks of physical therapy and wants to know what the next steps are after PT.

## 2024-05-23 NOTE — TELEPHONE ENCOUNTER
Patient recently had her xray done and was calling about the results.  Patient also has a couple more weeks of physical therapy and wants to know what the next steps are after PT.         LOV 5/1/24  \"Will await the x-rays to see if she has a history of a fracture of the fifth toe. Small likelihood of hammertoe developing which could be taken care of surgically at a later date. I do not think this will be the case from my exam\"    XR 5/14/24    FINDINGS:    No fracture or dislocation.  Small plantar calcaneal spur.  Metal anchor projected over lateral malleolus.      Impression   CONCLUSION:  Calcaneal enthesopathy.

## 2024-05-24 NOTE — TELEPHONE ENCOUNTER
Chelly Neely, DPM   to [facility] Clinical Pool        5/23/24  4:45 PM  I would like to see her when she finishes PT if still having pain  As far as xray, 5th toe looks normal

## 2024-05-24 NOTE — TELEPHONE ENCOUNTER
Spoke with patient to let her know Dr Rosales said her 5th toe looks normal and to follow up with her after PT is completed.  Appointment for follow up set for 6/25/24 at 9:30.

## 2024-06-25 ENCOUNTER — OFFICE VISIT (OUTPATIENT)
Dept: ORTHOPEDICS CLINIC | Facility: CLINIC | Age: 64
End: 2024-06-25

## 2024-06-25 VITALS — HEIGHT: 66 IN | BODY MASS INDEX: 20.57 KG/M2 | WEIGHT: 128 LBS

## 2024-06-25 DIAGNOSIS — M72.2 PLANTAR FASCIITIS OF LEFT FOOT: ICD-10-CM

## 2024-06-25 DIAGNOSIS — M20.5X1 HALLUX LIMITUS OF RIGHT FOOT: Primary | ICD-10-CM

## 2024-06-25 PROCEDURE — 3008F BODY MASS INDEX DOCD: CPT | Performed by: PODIATRIST

## 2024-06-25 PROCEDURE — 99214 OFFICE O/P EST MOD 30 MIN: CPT | Performed by: PODIATRIST

## 2024-06-25 RX ORDER — ESTRADIOL 0.5 MG/.5G
GEL TOPICAL
COMMUNITY
Start: 2024-01-26

## 2024-06-25 NOTE — PROGRESS NOTES
[facility] Progress Note    Subjective:     Mone is here for follow-up of the plantar fasciitis and also with a new issue, that of problems with her big toe joint on the right foot.  Therapy had suspected arthritis and have done some soft tissue mobilization but she is concerned about this.  She plays a lot of tennis and is concerned that she cannot push off that foot at times.  No history of injury that she can remember but she did have bunion surgery years ago and has done well with that.  As far as the plantar fasciitis she is a little better but therapy was not as helpful as she was hoping.  She has been using a heel pad and does not have any arch supports as of yet.      Objective:     Exam first MP joint right foot enlarged joint with limited range of motion and mild pain with motion.  And range.  Full range of motion nonpainful left for comparison.  Mild swelling and discomfort about the joint.  Toe is rectus and well-healed incision from bunion surgery    Exam left heel she is tender plantar fascial area plantar medial aspect and direct plantar aspect.  No pain with side-to-side compression no swelling today          Imaging: X-rays of her left foot reviewed which show a very small calcaneal spur plantar.        Assessment/Plan:     Diagnoses and all orders for this visit:    Plantar fasciitis    Hallux limitus of right foot  -     XR FOOT, COMPLETE (MIN 3 VIEWS), RIGHT (CPT=73630); Future      Discussed her x-rays of the left foot  Yes, she does have a heel spur but spur not significant and discussed why-does not need to be removed.    She may have a tear of the plantar fascia at some point because of tennis and that kind of explosive activity and side-to-side activity.  We could consider a soft tissue ultrasound with Dr. Atwood if she is not improving.  We can look at the plantar fascia neck gives us an idea of thickening of the fascia and also if there is a tear.    Come back when can do steroid  injection and rest, both heel left and first mpj right  Continue with PT      Order WB right foot xray to check first mpj  Previous bunion surgery as she has pretty good motion but definitely locked up and less motion than the left foot.  We discussed hallux limitus and what it is and how it can progress to hallux rigidus and eventual autofusion  There surgical things that can be done in the meantime depending on what stage she is in and if it is symptomatic.  We can also do a steroid injection and she could do that at the next visit as well as the heel injection so she can rest both for 2 to 3 days.  Discussed the type of shoes that are good for both conditions and arch support etc.  Getting arch supports at [facility] for a little more lift in the arch.  Less of a concern for heel cushion      Chelly Neely, [facility]  [facility]  [address], [address] [address]   [address] [place] [address]   [address] [place], [place] [address] [email or web address]  [email or web address]  t: [phone]   f: [phone]            Dragon speech recognition software was used to prepare this note. If a word or phrase is confusing, it is likely do to a failure of recognition. Please contact me with any questions or clarifications.

## 2024-06-28 ENCOUNTER — LAB ENCOUNTER (OUTPATIENT)
Dept: LAB | Age: 64
End: 2024-06-28
Attending: INTERNAL MEDICINE
Payer: COMMERCIAL

## 2024-06-28 DIAGNOSIS — E78.2 MIXED HYPERLIPIDEMIA: ICD-10-CM

## 2024-06-28 DIAGNOSIS — E55.9 VITAMIN D DEFICIENCY: ICD-10-CM

## 2024-06-28 DIAGNOSIS — M06.9 RHEUMATOID ARTHRITIS, INVOLVING UNSPECIFIED SITE, UNSPECIFIED WHETHER RHEUMATOID FACTOR PRESENT (HCC): ICD-10-CM

## 2024-06-28 DIAGNOSIS — D51.8 VITAMIN B12 DEFICIENCY (DIETARY) ANEMIA: ICD-10-CM

## 2024-06-28 DIAGNOSIS — Z00.00 ROUTINE GENERAL MEDICAL EXAMINATION AT A HEALTH CARE FACILITY: ICD-10-CM

## 2024-06-28 DIAGNOSIS — D64.9 ANEMIA, UNSPECIFIED TYPE: ICD-10-CM

## 2024-06-28 DIAGNOSIS — R73.9 HYPERGLYCEMIA: ICD-10-CM

## 2024-06-28 DIAGNOSIS — E03.9 PRIMARY HYPOTHYROIDISM: ICD-10-CM

## 2024-06-28 DIAGNOSIS — M10.9 GOUT, UNSPECIFIED CAUSE, UNSPECIFIED CHRONICITY, UNSPECIFIED SITE: ICD-10-CM

## 2024-06-28 LAB
ALBUMIN SERPL-MCNC: 3.7 G/DL (ref 3.4–5)
ALBUMIN/GLOB SERPL: 1.1 {RATIO} (ref 1–2)
ALP LIVER SERPL-CCNC: 42 U/L
ALT SERPL-CCNC: 21 U/L
ANION GAP SERPL CALC-SCNC: 6 MMOL/L (ref 0–18)
AST SERPL-CCNC: 20 U/L (ref 15–37)
BASOPHILS # BLD AUTO: 0.05 X10(3) UL (ref 0–0.2)
BASOPHILS NFR BLD AUTO: 1.2 %
BILIRUB SERPL-MCNC: 0.8 MG/DL (ref 0.1–2)
BUN BLD-MCNC: 10 MG/DL (ref 9–23)
CALCIUM BLD-MCNC: 8.7 MG/DL (ref 8.5–10.1)
CHLORIDE SERPL-SCNC: 108 MMOL/L (ref 98–112)
CHOLEST SERPL-MCNC: 185 MG/DL (ref ?–200)
CO2 SERPL-SCNC: 26 MMOL/L (ref 21–32)
CREAT BLD-MCNC: 0.83 MG/DL
CRP SERPL-MCNC: <0.29 MG/DL (ref ?–0.3)
EGFRCR SERPLBLD CKD-EPI 2021: 80 ML/MIN/1.73M2 (ref 60–?)
EOSINOPHIL # BLD AUTO: 0.09 X10(3) UL (ref 0–0.7)
EOSINOPHIL NFR BLD AUTO: 2.1 %
ERYTHROCYTE [DISTWIDTH] IN BLOOD BY AUTOMATED COUNT: 11.6 %
ERYTHROCYTE [SEDIMENTATION RATE] IN BLOOD: 10 MM/HR
EST. AVERAGE GLUCOSE BLD GHB EST-MCNC: 100 MG/DL (ref 68–126)
FASTING PATIENT LIPID ANSWER: YES
FASTING STATUS PATIENT QL REPORTED: YES
FOLATE SERPL-MCNC: 27.2 NG/ML (ref 8.7–?)
GLOBULIN PLAS-MCNC: 3.3 G/DL (ref 2.8–4.4)
GLUCOSE BLD-MCNC: 93 MG/DL (ref 70–99)
HBA1C MFR BLD: 5.1 % (ref ?–5.7)
HCT VFR BLD AUTO: 43.1 %
HDLC SERPL-MCNC: 87 MG/DL (ref 40–59)
HGB BLD-MCNC: 14.7 G/DL
IMM GRANULOCYTES # BLD AUTO: 0.01 X10(3) UL (ref 0–1)
IMM GRANULOCYTES NFR BLD: 0.2 %
LDLC SERPL CALC-MCNC: 89 MG/DL (ref ?–100)
LYMPHOCYTES # BLD AUTO: 1.54 X10(3) UL (ref 1–4)
LYMPHOCYTES NFR BLD AUTO: 35.6 %
MCH RBC QN AUTO: 34.2 PG (ref 26–34)
MCHC RBC AUTO-ENTMCNC: 34.1 G/DL (ref 31–37)
MCV RBC AUTO: 100.2 FL
MONOCYTES # BLD AUTO: 0.49 X10(3) UL (ref 0.1–1)
MONOCYTES NFR BLD AUTO: 11.3 %
NEUTROPHILS # BLD AUTO: 2.14 X10 (3) UL (ref 1.5–7.7)
NEUTROPHILS # BLD AUTO: 2.14 X10(3) UL (ref 1.5–7.7)
NEUTROPHILS NFR BLD AUTO: 49.6 %
NONHDLC SERPL-MCNC: 98 MG/DL (ref ?–130)
OSMOLALITY SERPL CALC.SUM OF ELEC: 289 MOSM/KG (ref 275–295)
PLATELET # BLD AUTO: 306 10(3)UL (ref 150–450)
POTASSIUM SERPL-SCNC: 4.2 MMOL/L (ref 3.5–5.1)
PROT SERPL-MCNC: 7 G/DL (ref 6.4–8.2)
RBC # BLD AUTO: 4.3 X10(6)UL
RHEUMATOID FACT SERPL-ACNC: <10 IU/ML (ref ?–15)
SODIUM SERPL-SCNC: 140 MMOL/L (ref 136–145)
TRIGL SERPL-MCNC: 42 MG/DL (ref 30–149)
TSI SER-ACNC: 2.62 MIU/ML (ref 0.36–3.74)
URATE SERPL-MCNC: 5 MG/DL
VIT B12 SERPL-MCNC: 423 PG/ML (ref 193–986)
VIT D+METAB SERPL-MCNC: 44 NG/ML (ref 30–100)
VLDLC SERPL CALC-MCNC: 7 MG/DL (ref 0–30)
WBC # BLD AUTO: 4.3 X10(3) UL (ref 4–11)

## 2024-06-28 PROCEDURE — 83036 HEMOGLOBIN GLYCOSYLATED A1C: CPT

## 2024-06-28 PROCEDURE — 86038 ANTINUCLEAR ANTIBODIES: CPT

## 2024-06-28 PROCEDURE — 85025 COMPLETE CBC W/AUTO DIFF WBC: CPT

## 2024-06-28 PROCEDURE — 86140 C-REACTIVE PROTEIN: CPT

## 2024-06-28 PROCEDURE — 85652 RBC SED RATE AUTOMATED: CPT

## 2024-06-28 PROCEDURE — 82306 VITAMIN D 25 HYDROXY: CPT

## 2024-06-28 PROCEDURE — 86039 ANTINUCLEAR ANTIBODIES (ANA): CPT

## 2024-06-28 PROCEDURE — 86431 RHEUMATOID FACTOR QUANT: CPT

## 2024-06-28 PROCEDURE — 86200 CCP ANTIBODY: CPT

## 2024-06-28 PROCEDURE — 80061 LIPID PANEL: CPT

## 2024-06-28 PROCEDURE — 84443 ASSAY THYROID STIM HORMONE: CPT

## 2024-06-28 PROCEDURE — 36415 COLL VENOUS BLD VENIPUNCTURE: CPT

## 2024-06-28 PROCEDURE — 84550 ASSAY OF BLOOD/URIC ACID: CPT

## 2024-06-28 PROCEDURE — 82746 ASSAY OF FOLIC ACID SERUM: CPT

## 2024-06-28 PROCEDURE — 82607 VITAMIN B-12: CPT

## 2024-06-28 PROCEDURE — 80053 COMPREHEN METABOLIC PANEL: CPT

## 2024-07-01 LAB
ANA NUCLEOLAR TITR SER IF: 160 {TITER}
CCP IGG SERPL-ACNC: 0.9 U/ML (ref 0–6.9)
NUCLEAR IGG TITR SER IF: POSITIVE {TITER}

## 2024-07-05 ENCOUNTER — HOSPITAL ENCOUNTER (OUTPATIENT)
Dept: GENERAL RADIOLOGY | Age: 64
Discharge: HOME | End: 2024-07-05
Attending: PODIATRIST
Payer: COMMERCIAL

## 2024-07-05 DIAGNOSIS — M20.5X1 HALLUX LIMITUS OF RIGHT FOOT: ICD-10-CM

## 2024-07-05 PROCEDURE — 73630 X-RAY EXAM OF FOOT: CPT | Performed by: PODIATRIST

## 2024-07-24 ENCOUNTER — OFFICE VISIT (OUTPATIENT)
Facility: CLINIC | Age: 64
End: 2024-07-24
Payer: COMMERCIAL

## 2024-07-24 VITALS — HEIGHT: 66 IN | WEIGHT: 131 LBS | BODY MASS INDEX: 21.05 KG/M2

## 2024-07-24 DIAGNOSIS — M72.2 PLANTAR FASCIITIS OF LEFT FOOT: Primary | ICD-10-CM

## 2024-07-24 PROCEDURE — 99204 OFFICE O/P NEW MOD 45 MIN: CPT | Performed by: FAMILY MEDICINE

## 2024-07-24 PROCEDURE — 3008F BODY MASS INDEX DOCD: CPT | Performed by: FAMILY MEDICINE

## 2024-07-24 RX ORDER — DIAZEPAM 5 MG/1
TABLET ORAL
COMMUNITY
Start: 2024-07-13

## 2024-07-24 NOTE — H&P
Sports Medicine Clinic Note     Subjective:    Chief Complaint   Patient presents with    Procedure     New patient Left foot ultrasound referred by Chin;  Onset - playing tennis back in April this year      History of Present Illness: 63 year old female presents for an ultrasound of the left foot to evaluate for a plantar fascia tear. She continues to experience pain despite previous interventions, including physical therapy and the use of heel pads. She reports some improvement but not to the extent hoped for. She plays a lot of tennis and is worried about her ability to push off her foot due to the plantar pain. No significant history of injury is recalled, but she did have successful bunion surgery years ago.    Objective:    Left Foot Examination:    Inspection:  - No visible swelling or deformity.    Palpation:  - Tenderness at the plantar medial aspect and direct plantar aspect of the left heel.  - No tenderness with side-to-side compression.    Range of Motion:  - Full range of motion without pain on the left foot and ankle.    Neurovascular:  - Intact sensation and pulses in both feet.    Special Tests:  - Positive Windlass test.    Diagnostic Tests:    X-rays of the left foot also reviewed show a very small calcaneal spur on the plantar aspect.    Ultrasound of the Left Foot:    Probe Selection: High-frequency linear transducer.  Patient Position: Supine on exam table, foot and ankle in neutral position.    Findings:  High-resolution images were obtained in both longitudinal and transverse planes.  The plantar fascia was visualized from its origin at the medial calcaneal tubercle to its insertion at the metatarsal heads.  The left plantar fascia thickness measured 4.3 mm, compared to the normal range of less than 4 mm, indicating thickening. The right plantar fascia thickness was measured for comparison at 3.8 mm.  There was a questionable hypoechoic area at the heel where the plantar fascia inserts,  suggesting a possible small interstitial tear. However, no gapping was observed with stress testing, favoring the interpretation of this area as fluid from inflammation rather than a tear.  No evidence of a full-thickness tear of the plantar fascia was observed.  The surrounding soft tissues, including the flexor tendons and intrinsic muscles of the foot, appeared normal.  There was evidence of a heel spur or calcaneal enthesophyte in the ultrasound images, corroborating the prior X-ray findings.    Assessment:    Left plantar fasciitis with thickening of the plantar fascia.    Plan:    Therapy:  - Continue with home exercise therapy focusing on plantar fascia-specific stretching and strengthening exercises. Consider formal PT defer to Dr. Neely if she thinks this is necessary.  - Consider alternative or additional therapeutic modalities such as shockwave therapy or ultrasound therapy if traditional physical therapy continues to be insufficient.    Medications:  - Continue current NSAIDs for pain management as needed.    Bracing/Casting:  - Custom arch supports to provide additional lift and support for the arch.    Procedures:  - Patient plans for steroid injection in the left heel at the next visit with Dr. Neely to address inflammation and pain deferred for time being.    Activity Recommendations:  - Advise continued limitation of high-impact activities such as tennis.  - Encourage low-impact activities such as swimming or cycling to maintain fitness without exacerbating symptoms.    Follow-Up:  - Tentative follow-up scheduled with Dr. Neely as planned to assess response to ongoing treatment and consider further interventions as necessary. Instructed patient to return earlier if symptoms worsen or new symptoms develop.      Raf Atwood DO, CAQSM  Primary Care Sports Medicine    Department of Orthopaedic Surgery  [facility]    [address] [place] [address] [place] [address]  [address] [place] [address],  [place] [address]    t: [phone]  f: [phone]      [email or web address]

## 2024-07-29 ENCOUNTER — OFFICE VISIT (OUTPATIENT)
Dept: INTERNAL MEDICINE CLINIC | Facility: CLINIC | Age: 64
End: 2024-07-29
Payer: COMMERCIAL

## 2024-07-29 ENCOUNTER — PATIENT MESSAGE (OUTPATIENT)
Dept: INTERNAL MEDICINE CLINIC | Facility: CLINIC | Age: 64
End: 2024-07-29

## 2024-07-29 VITALS
DIASTOLIC BLOOD PRESSURE: 78 MMHG | WEIGHT: 131.19 LBS | OXYGEN SATURATION: 98 % | TEMPERATURE: 98 F | HEART RATE: 85 BPM | BODY MASS INDEX: 21.08 KG/M2 | SYSTOLIC BLOOD PRESSURE: 132 MMHG | RESPIRATION RATE: 16 BRPM | HEIGHT: 66 IN

## 2024-07-29 DIAGNOSIS — Z78.0 POST-MENOPAUSAL: ICD-10-CM

## 2024-07-29 DIAGNOSIS — I10 ESSENTIAL HYPERTENSION: Primary | ICD-10-CM

## 2024-07-29 DIAGNOSIS — R76.8 POSITIVE ANA (ANTINUCLEAR ANTIBODY): ICD-10-CM

## 2024-07-29 DIAGNOSIS — F41.9 ANXIETY: ICD-10-CM

## 2024-07-29 PROBLEM — R87.629 ABNORMAL PAPANICOLAOU SMEAR OF VAGINA: Status: RESOLVED | Noted: 2023-05-25 | Resolved: 2024-07-29

## 2024-07-29 PROBLEM — R20.2 PARESTHESIA: Status: RESOLVED | Noted: 2017-06-16 | Resolved: 2024-07-29

## 2024-07-29 PROBLEM — J30.9 ALLERGIC RHINITIS DUE TO ALLERGEN: Status: RESOLVED | Noted: 2021-09-16 | Resolved: 2024-07-29

## 2024-07-29 PROBLEM — K21.9 LPRD (LARYNGOPHARYNGEAL REFLUX DISEASE): Status: RESOLVED | Noted: 2021-01-05 | Resolved: 2024-07-29

## 2024-07-29 PROBLEM — G47.00 INSOMNIA: Status: RESOLVED | Noted: 2017-04-25 | Resolved: 2024-07-29

## 2024-07-29 PROBLEM — R00.0 TACHYCARDIA: Status: RESOLVED | Noted: 2022-08-02 | Resolved: 2024-07-29

## 2024-07-29 PROBLEM — E07.9 THYROID DISORDER: Status: RESOLVED | Noted: 2021-09-16 | Resolved: 2024-07-29

## 2024-07-29 PROBLEM — H91.90 HARD OF HEARING: Status: RESOLVED | Noted: 2022-11-01 | Resolved: 2024-07-29

## 2024-07-29 NOTE — PROGRESS NOTES
Patient Office Visit    ASSESSMENT AND PLAN:   1. Essential hypertension  Note: controlled on losartan. Will continue     2. Anxiety  Note: agreed with patient to wean off the diazepam. Per chart review she does drink alcohol regularly. Patient does not want to take a daily medication at this time    3. Positive GARRET (antinuclear antibody)  Note: she is not interested in seeing the rheumatologist. Will send a message to the specialist to see if any further testing is recommended. Per chart review patient has seen a rheumatologist in the past, but at that time the GARRET was negative     4. Post-menopausal  Note: patient is on hormonal treatment. Upon chart review, the previous rheumatologist recommended against treatment given risks of stroke. Will ensure patient is aware of the risk     RTC in 6 months       Patient/Caregiver Education: Patient/Caregiver Education: There are no barriers to learning. Medical education done. Outcome: Patient verbalizes understanding. Patient is notified to call with any questions, complications, allergies, or worsening or changing symptoms.  Patient is to call with any side effects or complications from the treatments as a result of today.      Reviewed Past Medical History and   Patient Active Problem List   Diagnosis    Anxiety    Essential hypertension    Insomnia    Antiphospholipid antibody positive    Small fiber neuropathy    LPRD (laryngopharyngeal reflux disease)    Allergic rhinitis due to allergen    History of hypoglycemia    Hard of hearing    Abnormal Papanicolaou smear of vagina       No orders of the defined types were placed in this encounter.    Requested Prescriptions      No prescriptions requested or ordered in this encounter         Jasmina Heaton DO  CC:  Chief Complaint   Patient presents with    Establish [facility]     New patient and was 20 minutes late     HPI:   Mone Wolff is a 63 year old female who presents to establish care    Hypertension/anxiety:  she takes her blood pressure medication regularly. She is trying to wean off diazepam. She is following up with a chiropractor who has been recommending over the counter treatments to help with anxiety  Positive GARRET: she recently had this done, but has no personal history of lupus, rash or worsening joint pains. Upon chart review, patient has seen the rheumatologist in the past however GARRET was negative at that time  Hormonal treatment: she is getting bioidentical treatment through a gynecologist     Past Medical History:    Essential hypertension    Thyroid disease       Past Surgical History:   Procedure Laterality Date          2    Cholecystectomy      Colonoscopy      Hysterectomy  10/2018    Other surgical history      Carpal tunnel right hand     Other surgical history      Bilateral knee surgery     Other surgical history      Oral tooth extraction surgery     Other surgical history Bilateral     Breast implantation       Social History:  Social History     Socioeconomic History    Marital status:    Tobacco Use    Smoking status: Never    Smokeless tobacco: Never   Vaping Use    Vaping status: Never Used   Substance and Sexual Activity    Alcohol use: Yes     Alcohol/week: 4.0 standard drinks of alcohol     Types: 4 Glasses of wine per week     Comment: moderate    Drug use: No   Other Topics Concern    Caffeine Concern No    Exercise Yes    Seat Belt Yes    Special Diet No    Stress Concern No    Weight Concern No     Family History:  Family History   Problem Relation Age of Onset    Hypertension Father     Stroke Father     Other (Parkinson's Dosease) Father     Other (Alcoholism) Father     Other (gout) Father     Other (Ankylosing spondylitis ) Father     Cancer Mother         uterine    Hypertension Mother     Heart Disorder Mother         heart attack    Hypertension Sister     Hypertension Brother     Stroke Paternal Grandmother     Other (Ruptured cerebral aneurysm) Paternal  Grandfather      Allergies:  Allergies   Allergen Reactions    Dust Mites Runny nose and Tightness in Throat     Current Meds:  Current Outpatient Medications on File Prior to Visit   Medication Sig Dispense Refill    diazePAM 5 MG Oral Tab Take 1 tablet twice a day by oral route as needed for 30 days, for anxiety.      losartan 25 MG Oral Tab Take 1 tablet (25 mg total) by mouth every morning. 90 tablet 1     No current facility-administered medications on file prior to visit.         REVIEW OF SYSTEMS   Constitutional: no fatigue normal energy no weight changes   HENT: normal sinuses and no mouth issues   Eyes: . normal vision no eye pain   Respiratory: normal respirations no cough   Cardiovascular: no CP, or palpitations   Gastrointestinal: normal bowels and no abd pains   Genitourinary:  normal urination no hematuria, no frequency   Musculoskeletal: no pains in arms/legs, normal range of motion   Skin: no rashes or skin lesions that are new   Neurological:  no weakness, no numbness, normal gait   Hematological:  no bruises or bleeding   Psychiatric/Behavioral: normal mood no anxiety normal behavior     /78 (BP Location: Right arm, Patient Position: Sitting, Cuff Size: adult)   Pulse 85   Temp 97.8 °F (36.6 °C) (Temporal)   Resp 16   Ht 5' 6\" (1.676 m)   Wt 131 lb 3.2 oz (59.5 kg)   LMP 06/22/2016 (Approximate)   SpO2 98%   BMI 21.18 kg/m²     PHYSICAL EXAM:   Constitutional: Vital signs reviewed as noted, well developed, in no acute distress.   HENT: NCAT  Eyes: pupils reactive bilaterally  Cardiovascular: nl s1 s2 no m/r/g  Pulmonary/Chest: CTA bilaterally with no wheezes  Extremities: no pedal edema   Neurological:  no weakness in UE and LE, reflexes are normal  Skin: no rashes or bruises on visualized skin, not undressed   Psychiatric:normal mood

## 2024-07-31 NOTE — TELEPHONE ENCOUNTER
From: Jasmina Heaton  To: Mone Wolff  Sent: 7/29/2024 1:09 PM CDT  Subject: message    Luis Shore    Nice to meet you. I have sent a message to the rheumatologist. I see that you had seen one in 2017 and she had recommended against hormonal treatments as you have an increased risk for blood clots. I would highly recommend that you stop the hormonal treatment and discuss with your gynecologist about other alternates.     Jasmina Heaton DO

## 2024-08-07 ENCOUNTER — OFFICE VISIT (OUTPATIENT)
Dept: ORTHOPEDICS CLINIC | Facility: CLINIC | Age: 64
End: 2024-08-07
Payer: COMMERCIAL

## 2024-08-07 VITALS — BODY MASS INDEX: 21.08 KG/M2 | HEIGHT: 66 IN | WEIGHT: 131.19 LBS

## 2024-08-07 DIAGNOSIS — M72.2 PLANTAR FASCIITIS: Primary | ICD-10-CM

## 2024-08-07 DIAGNOSIS — M20.5X1 HALLUX LIMITUS OF RIGHT FOOT: ICD-10-CM

## 2024-08-07 DIAGNOSIS — R76.8 POSITIVE ANA (ANTINUCLEAR ANTIBODY): ICD-10-CM

## 2024-08-07 PROCEDURE — 3008F BODY MASS INDEX DOCD: CPT | Performed by: PODIATRIST

## 2024-08-07 PROCEDURE — 99214 OFFICE O/P EST MOD 30 MIN: CPT | Performed by: PODIATRIST

## 2024-08-07 NOTE — PROGRESS NOTES
[facility] Progress Note    Subjective:     Mone is here in anticipation of discussion of surgery for the first MP joint right foot.  We had talked about a cheilectomy in the past and she also had seen another orthopedic doctor where an injection of the left heel which was helpful, and also thoughts on the right big toe joint.  Cheilectomy was also recommended.  She is very active and would like to maintain her activity.    The left heel overall is feeling better although she would like to do some physical therapy again.  She thinks the injection helped for a time.  She had seen Dr. Atwood for ultrasound study and there was inflammation and thickening of the fascia but no tear.  I had been concerned for a plantar fascial tear with all of the tennis and plyometric type activities        Objective:     Exam left heel less tenderness today about the left heel    Exam right foot first MP joint well-healed incision.  Limited range of motion first MP joint with no swelling today.  Toe is pretty rectus.  Mild palpable tenderness about the enlarged joint      Imaging: Ultrasound left, thickening of the fascia but no plantar fascial tear        Assessment/Plan:     Diagnoses and all orders for this visit:    Plantar fasciitis  -     Physical Therapy Referral - External  -     XR FOOT WEIGHTBEARING (3 VIEWS), RIGHT   (CPT=73630); Future    Hallux limitus of right foot  -     Physical Therapy Referral - External  -     XR FOOT WEIGHTBEARING (3 VIEWS), RIGHT   (CPT=73630); Future    Positive GARRET (antinuclear antibody)        The plantar fasciitis of the left heel has improved and she would like to do a little more therapy at a prior place.  We gave her a new prescription for that    As far as the hallux limitus condition right foot we discussed the cheilectomy versus other joint sparing procedures versus fusion of the joint.  This is a little early to fuse the joint when she is looking for something in the interim to  give her some help and I think a cheilectomy may give her a little time and hopefully a lot of relief.  We also discussed the fact that sometimes it can make the joint worse as it is an arthritic joint.  We discussed the procedure involved, MAC with local anesthesia and postop course getting the joint moving right away and early weightbearing.  Always a chance of infection and continued pain. Also going into the joint a second time - first bunion procedure was several years ago.      She may also be seeing Dr. Bellamy in the interim for any other ideas about cartilage sparing procedures  Instead of eventual fusion of the joint.  I usually only do a cheilectomy or fuse the joint.          SURGERY SCHEDULING SHEET    She is looking to early November  Likely Tae Wolff  12/22/1960  [id number]    Procedure: Right   MPJ cheilectomy    Diagnosis: hallux limitus right foot    Anesthesia: Monitored Anesthesia Care (MAC)     Length of Surgery: 30 mins    Disposition: Outpatient    Special Equipment: NONE    Assist: No    Pre-op Testing: PER ANESTHESIA GUIDELINES    Clearance: HISTORY AND PHYSICAL     Post op: Day after surgery    Chelly Neely DPM  [facility] Orthopedic Surgery  Phone: [phone]  Fax: [phone]       [facility]  [facility]  [address] [place] [address] [id number] [place] [address]   [address] [place], [place] [address] [place] [address].[facility]  [email or web address]  t: [phone]   f: [phone]            Dragon speech recognition software was used to prepare this note. If a word or phrase is confusing, it is likely do to a failure of recognition. Please contact me with any questions or clarifications.

## 2024-08-08 ENCOUNTER — TELEPHONE (OUTPATIENT)
Dept: ORTHOPEDICS CLINIC | Facility: CLINIC | Age: 64
End: 2024-08-08

## 2024-08-08 DIAGNOSIS — M20.5X1 HALLUX LIMITUS OF RIGHT FOOT: Primary | ICD-10-CM

## 2024-08-08 NOTE — TELEPHONE ENCOUNTER
Date of Surgery: 11/08/2024    Post Op Appt:  11/12/2024 10AM    Case ID: [id number]    Notes:         SURGERY SCHEDULING SHEET     She is looking to early November  Likely Tae Shore DAVE Wolff  12/22/1960  [id number]     Procedure: Right   MPJ cheilectomy  (34545)     Diagnosis: hallux limitus right foot     Anesthesia: Monitored Anesthesia Care (MAC)      Length of Surgery: 30 mins     Disposition: Outpatient     Special Equipment: NONE     Assist: No     Pre-op Testing: PER ANESTHESIA GUIDELINES     Clearance: HISTORY AND PHYSICAL      Post op: Day after surgery     Chelly Neely DPM  [facility] Orthopedic Surgery  Phone: [phone]  Fax: [phone]         [facility] Do[facility]odiatric Surgery  [facility]  [address] [place] [address] [id number] [place] [address]   [address] [place], [place] [id number] [place] [address].[email or web address]  [email or web address]  t: [phone]   f: [phone]

## 2024-08-13 ENCOUNTER — HOSPITAL ENCOUNTER (OUTPATIENT)
Dept: GENERAL RADIOLOGY | Age: 64
Discharge: HOME | End: 2024-08-13
Attending: PODIATRIST
Payer: COMMERCIAL

## 2024-08-13 DIAGNOSIS — M20.5X1 HALLUX LIMITUS OF RIGHT FOOT: ICD-10-CM

## 2024-08-13 DIAGNOSIS — M72.2 PLANTAR FASCIITIS: ICD-10-CM

## 2024-08-13 PROCEDURE — 73630 X-RAY EXAM OF FOOT: CPT | Performed by: PODIATRIST

## 2024-08-20 ENCOUNTER — LAB ENCOUNTER (OUTPATIENT)
Dept: LAB | Age: 64
End: 2024-08-20
Attending: INTERNAL MEDICINE
Payer: COMMERCIAL

## 2024-08-20 DIAGNOSIS — R76.8 POSITIVE ANA (ANTINUCLEAR ANTIBODY): Primary | ICD-10-CM

## 2024-08-20 LAB
CRP SERPL-MCNC: <0.4 MG/DL (ref ?–0.5)
ERYTHROCYTE [SEDIMENTATION RATE] IN BLOOD: 12 MM/HR
RHEUMATOID FACT SERPL-ACNC: 8.7 IU/ML (ref ?–14)

## 2024-08-20 PROCEDURE — 86225 DNA ANTIBODY NATIVE: CPT | Performed by: INTERNAL MEDICINE

## 2024-08-20 PROCEDURE — 86038 ANTINUCLEAR ANTIBODIES: CPT | Performed by: INTERNAL MEDICINE

## 2024-08-21 LAB
CCP IGG SERPL-ACNC: 1.5 U/ML (ref 0–6.9)
DSDNA IGG SERPL IA-ACNC: 5.3 IU/ML
ENA AB SER QL IA: 0.2 UG/L
ENA AB SER QL IA: NEGATIVE

## 2024-09-19 DIAGNOSIS — F51.01 PRIMARY INSOMNIA: ICD-10-CM

## 2024-09-19 RX ORDER — ZOLPIDEM TARTRATE 5 MG/1
2.5 TABLET ORAL NIGHTLY PRN
Qty: 30 TABLET | Refills: 0 | Status: SHIPPED | OUTPATIENT
Start: 2024-09-19

## 2024-09-19 NOTE — TELEPHONE ENCOUNTER
zolpidem 5 MG Oral Tab         Sig: Take 0.5 tablets (2.5 mg total) by mouth nightly as needed for Sleep.    Disp: 30 tablet    Refills: 2    Start: 9/19/2024    Class: Normal    Non-formulary For: Primary insomnia    Last ordered: 3 weeks ago (8/26/2024) by Jasmina Heaton DO    Patient comment: Can you pls request 30 day supply of 5mg - there were only 15 pills last time.  Tkx    Controlled Substance Medication [id number]/19/2024 08:04 AM    This medication is a controlled substance - forward to provider to refill      To be filled at: [id number] - [place], [place] - [address] AT [facility] [address], [phone], [phone]

## 2024-09-26 ENCOUNTER — HOSPITAL ENCOUNTER (OUTPATIENT)
Dept: MAMMOGRAPHY | Age: 64
Discharge: HOME | End: 2024-09-26
Attending: OBSTETRICS & GYNECOLOGY
Payer: COMMERCIAL

## 2024-09-26 DIAGNOSIS — Z12.31 SCREENING MAMMOGRAM FOR BREAST CANCER: ICD-10-CM

## 2024-09-26 PROCEDURE — 77063 BREAST TOMOSYNTHESIS BI: CPT | Performed by: OBSTETRICS & GYNECOLOGY

## 2024-09-26 PROCEDURE — 77067 SCR MAMMO BI INCL CAD: CPT | Performed by: OBSTETRICS & GYNECOLOGY

## 2024-09-27 ENCOUNTER — OFFICE VISIT (OUTPATIENT)
Dept: PODIATRY CLINIC | Facility: CLINIC | Age: 64
End: 2024-09-27
Payer: COMMERCIAL

## 2024-09-27 DIAGNOSIS — M20.21 HALLUX RIGIDUS, RIGHT FOOT: Primary | ICD-10-CM

## 2024-09-27 DIAGNOSIS — M72.2 PLANTAR FASCIITIS, LEFT: ICD-10-CM

## 2024-09-27 PROCEDURE — 99204 OFFICE O/P NEW MOD 45 MIN: CPT | Performed by: STUDENT IN AN ORGANIZED HEALTH CARE EDUCATION/TRAINING PROGRAM

## 2024-09-28 NOTE — PROGRESS NOTES
[facility]iatry  Progress Note    Mone Wolff is a 63 year old female.   Chief Complaint   Patient presents with    Consult     Right hallux- has cartilage damage in the right hallux- want to know if there is a different option-rates pain 7/10 when walking          HPI:     Patient is a very pleasant 63-year-old female who presents to clinic for evaluation of multiple pedal complaints.  She does have arthritic changes to her great toe joint and is scheduled for upcoming cheilectomy procedure with Dr. Neely on 2024.  She does have history of [place] bunionectomy to site but has developed worsening arthritic changes since this time.  She does have stiffness and pain to her great toe that prevents her from completing activities she would like to.  She is very active and plays tennis and hikes frequently.  She would like to preserve motion at her joint if possible.  Additionally, patient has signs of plantar fasciitis symptoms to her left foot.  She has tried a number of supportive shoes and stretch.  She has had cortisone injection in the past which did not seem to help her symptoms very much.  No other complaints are mentioned.  Past medical history, medications, and allergies reviewed.      Allergies: Dust mites   Current Outpatient Medications   Medication Sig Dispense Refill    zolpidem 5 MG Oral Tab Take 0.5 tablets (2.5 mg total) by mouth nightly as needed for Sleep. 30 tablet 0    diazePAM 5 MG Oral Tab Take 1 tablet twice a day by oral route as needed for 30 days, for anxiety.      losartan 25 MG Oral Tab Take 1 tablet (25 mg total) by mouth every morning. 90 tablet 1      Past Medical History:    Essential hypertension    Thyroid disease      Past Surgical History:   Procedure Laterality Date          2    Cholecystectomy      Colonoscopy      Hysterectomy  10/2018    Other surgical history      Carpal tunnel right hand     Other surgical history      Bilateral knee surgery     Other  surgical history      Oral tooth extraction surgery     Other surgical history Bilateral     Breast implantation      Family History   Problem Relation Age of Onset    Hypertension Father     Stroke Father     Other (Parkinson's Dosease) Father     Other (Alcoholism) Father     Other (gout) Father     Other (Ankylosing spondylitis ) Father     Cancer Mother         uterine    Hypertension Mother     Heart Disorder Mother         heart attack    Hypertension Sister     Hypertension Brother     Stroke Paternal Grandmother     Other (Ruptured cerebral aneurysm) Paternal Grandfather       Social History     Socioeconomic History    Marital status:    Tobacco Use    Smoking status: Never    Smokeless tobacco: Never   Vaping Use    Vaping status: Never Used   Substance and Sexual Activity    Alcohol use: Yes     Alcohol/week: 4.0 standard drinks of alcohol     Types: 4 Glasses of wine per week     Comment: moderate    Drug use: No   Other Topics Concern    Caffeine Concern No    Exercise Yes    Seat Belt Yes    Special Diet No    Stress Concern No    Weight Concern No           REVIEW OF SYSTEMS:     No nausea, vomiting, fever, chills.      EXAM:   LMP 06/22/2016 (Approximate)   GENERAL: well developed, well nourished, in no apparent distress  EXTREMITIES:   1. Integument: Normal skin temperature and turgor.  Well-healed cicatrix to right great toe from prior surgery.  2. Vascular: Dorsalis pedis two out of four bilateral and posterior tibial pulses two out of   four bilateral, capillary refill normal.   3. Musculoskeletal: All muscle groups are graded 5 out of 5 in the foot and ankle.  Decreased first MPJ range of motion.  Palpable exostosis noted to site.  Mostly rectus position of right hallux.  Pain with right first MPJ range of motion.  Pain noted to left plantar heel at medial tubercle of calcaneus.   4. Neurological: Normal sharp dull sensation; reflexes normal.    [facility] BINH 2D+3D SCREENING [facility]  ANNE MARIE([id number])    Result Date: 9/26/2024  CONCLUSION:  No mammographic evidence of malignancy.    BI-RADS CATEGORY:  DIAGNOSTIC CATEGORY 1 - NEGATIVE ASSESSMENT.   RECOMMENDATIONS:  ROUTINE MAMMOGRAM AND CLINICAL EVALUATION IN 12 MONTHS.   Because of breast density, this patient may benefit from supplemental screening with breast MRI, Molecular Breast Imaging (MBI) or bilateral whole breast ultrasound for increased sensitivity for detection of cancer which can be obscured mammographically.   Please contact your ordering provider to discuss supplemental screening options.    A letter explaining the results in lay terms has been sent to the patient.  This exam was evaluated with a computer-aided device.  This patient's information has been entered into a reminder system with a target due date for the next mammogram.   LOCATION:  [facility]   Dictated by (CST): Claudio Hedrick MD on 9/26/2024 at 2:37 PM     Finalized by (CST): Claudio Hedrick MD on 9/26/2024 at 2:39 PM   [facility] [address], [place] [address] [phone]    XR FOOT WEIGHTBEARING (3 VIEWS), RIGHT   (CPT=73630)    Result Date: 8/13/2024  CONCLUSION:    Surgical changes from bunionectomy.  Metal screw distal 1st metatarsal bone stable.  No developing hallux valgus.  Degenerative joint changes at the 1st metatarsophalangeal joint redemonstrated.  No acute fracture dislocation.  Lateral view shows no pes planus.  There is a plantar calcaneal spur redemonstrated.  LOCATION:  [facility]   Dictated by (CST): Shola Prabhakar MD on 8/13/2024 at 9:54 AM     Finalized by (CST): Shola Prabhakar MD on 8/13/2024 at 9:54 AM       XR FOOT, COMPLETE (MIN 3 VIEWS), RIGHT (CPT=73630)    Result Date: 7/5/2024  CONCLUSION:  See above.   LOCATION:  [facility]   Dictated by (CST): Joss Fernandes MD on 7/05/2024 at 9:39 AM     Finalized by (CST): Joss Fernandes MD on 7/05/2024 at 9:39 AM             ASSESSMENT AND PLAN:   Diagnoses and all orders for this  visit:    Hallux rigidus, right foot  -     DME - EXTERNAL     Plantar fasciitis, left  -     DME - EXTERNAL         Plan:    -Patient examined, chart history reviewed.  -Discussed etiology of condition and various treatment options.  -X-rays reviewed and discussed with patient.  -Patient does have a moderate amount of arthritic changes to the right great toe joint.  She is scheduled for cheilectomy procedure with Dr. Neely on 11/8/2024.  -Discussed this procedure with patient as well as alternatives such as Levi implant of right first MPJ fusion.  Discussed pros and cons of each treatment option.  -Discussed with patient that I think cheilectomy is a good treatment option in the short-term.  Discussed this procedure is beneficial because it will not burn any bridges for future procedures and will have a short recovery.  Hopefully will also help with range of motion and pain as well.  -Discussed that long-term she may benefit from first MPJ fusion procedure.  Discussed with patient that this procedure does typically have very functional outcomes and she could likely still participate in many of the activities that she enjoys with less pain.  -Discussed that Levi implant is an option for her but the lifespan of this implant may not work for her given her young age and high activity level.  Discussed that I typically like to use this for patients that are older than her and are more sedentary as the implant will wear out over time.    Also discussed plantar fasciitis treatment options for her left foot.  Order placed for custom orthotics for patient.  Discussed importance of stretching and supportive shoe gear.  Also discussed the importance of anti-inflammatory/icing.  Could consider another cortisone injection if patient would like.  Could also consider Topaz procedure endoscopic plantar fasciotomy if symptoms worsen or fail to improve.    All questions answered to satisfaction.  I agree with patient moving  forward with cheilectomy with Dr. Neely.  Happy to evaluate patient for other concerns moving forward as well.    The patient indicates understanding of these issues and agrees to the plan.        Guanako Bellamy DPM    Dragon speech recognition software was used to prepare this note.  Errors in word recognition may occur.  Please contact me with any questions/concerns with this note.

## 2024-10-03 NOTE — TELEPHONE ENCOUNTER
DOS CHANGED TO 11/21/24 PER PT REQUEST    Date of Surgery: 11/08/2024     Post Op Appt:  11/12/2024 10AM     Case ID: [id number]     Notes:

## 2024-10-04 ENCOUNTER — TELEPHONE (OUTPATIENT)
Dept: INTERNAL MEDICINE CLINIC | Facility: CLINIC | Age: 64
End: 2024-10-04

## 2024-10-04 NOTE — TELEPHONE ENCOUNTER
Fax received from Ortho   Pt having surgery on 11/21/24 with Dr. Neely   Requesting H&P and medical clearance    Future Appointments   Date Time Provider Department Center   11/2/2024  4:00 PM  MR RM2 (1.5T WIDE)  MRI [facility]   11/20/2024  9:30 AM Jasmina Heaton,  EMG 8 [facility]   11/26/2024  8:00 AM Chelly Neely, ZEVM EM[facility] 75 [facility]     Pt has her pre-op marlene schedule for the day before her surgery  Lov: 7/29/24

## 2024-10-17 NOTE — TELEPHONE ENCOUNTER
CHANGED PT DOS TO 12/12/24 PER PT REQUEST    DOS CHANGED TO 11/21/24 PER PT REQUEST     Date of Surgery: 11/08/2024     Post Op Appt:  11/12/2024 10AM     Case ID: [id number]     Notes:

## 2024-10-29 ENCOUNTER — TELEPHONE (OUTPATIENT)
Dept: INTERNAL MEDICINE CLINIC | Facility: CLINIC | Age: 64
End: 2024-10-29

## 2024-10-29 NOTE — TELEPHONE ENCOUNTER
FAX RECEVIED FROM PRE-OP  PT HAVING SURGERY ON 12/12/24  REQUESTING:  H&P  MEDICAL CLEARANCE    Future Appointments   Date Time Provider Department Center   11/2/2024  4:00 PM  MR RM2 (1.5T WIDE)  MRI [facility]   11/21/2024  7:30 AM PF [facility] PF OUTPT LAB [facility]   11/21/2024  7:45 AM PF [facility] PF OUTPT [facility]   12/9/2024 10:00 AM Jasmina Heaton,  EMG 8 [facility] [place]   12/17/2024  8:00 AM Chelly Neely, DPM EM[facility]O 75 [facility]

## 2024-11-02 ENCOUNTER — HOSPITAL ENCOUNTER (OUTPATIENT)
Dept: MRI IMAGING | Facility: HOSPITAL | Age: 64
Discharge: HOME | End: 2024-11-02
Attending: OBSTETRICS & GYNECOLOGY
Payer: COMMERCIAL

## 2024-11-02 DIAGNOSIS — R92.30 DENSE BREAST TISSUE: ICD-10-CM

## 2024-11-11 ENCOUNTER — TELEPHONE (OUTPATIENT)
Dept: ORTHOPEDICS CLINIC | Facility: CLINIC | Age: 64
End: 2024-11-11

## 2024-11-11 DIAGNOSIS — M20.5X1 HALLUX LIMITUS OF RIGHT FOOT: Primary | ICD-10-CM

## 2024-11-11 NOTE — TELEPHONE ENCOUNTER
Date of Surgery: 12/20/2024      Post Op Appt: 12/24/2024 830AM    Case ID: [id number]    Notes:     SURGERY SCHEDULING SHEET     She is looking to early November  Likely Tae ACOSTA New  12/22/1960  [id number]     Procedure: Right   MPJ cheilectomy  (43035)     Diagnosis: hallux limitus right foot     Anesthesia: Monitored Anesthesia Care (MAC)      Length of Surgery: 30 mins     Disposition: Outpatient     Special Equipment: NONE     Assist: No     Pre-op Testing: PER ANESTHESIA GUIDELINES     Clearance: HISTORY AND PHYSICAL      Post op: Day after surgery     Chelly Neely DPM  [facility] Orthopedic Surgery  Phone: [phone]  Fax: [phone]         [facility]  [facility]Or[facility]oped[facility]  [address] [place] [address] [id number] [place] [id number]   [address] [place], [place] [address] [place] [address].[facility]  [email or web address]  t: [phone]   f: [phone]

## 2024-11-14 ENCOUNTER — HOSPITAL ENCOUNTER (OUTPATIENT)
Age: 64
Discharge: HOME | End: 2024-11-14
Attending: EMERGENCY MEDICINE
Payer: COMMERCIAL

## 2024-11-14 VITALS
DIASTOLIC BLOOD PRESSURE: 71 MMHG | OXYGEN SATURATION: 97 % | SYSTOLIC BLOOD PRESSURE: 140 MMHG | HEART RATE: 76 BPM | TEMPERATURE: 98 F | RESPIRATION RATE: 18 BRPM

## 2024-11-14 DIAGNOSIS — H81.399 PERIPHERAL VERTIGO, UNSPECIFIED LATERALITY: Primary | ICD-10-CM

## 2024-11-14 PROCEDURE — 99213 OFFICE O/P EST LOW 20 MIN: CPT

## 2024-11-14 PROCEDURE — 99203 OFFICE O/P NEW LOW 30 MIN: CPT

## 2024-11-14 RX ORDER — MECLIZINE HYDROCHLORIDE 25 MG/1
25 TABLET ORAL EVERY 6 HOURS PRN
Qty: 20 TABLET | Refills: 0 | Status: SHIPPED | OUTPATIENT
Start: 2024-11-14

## 2024-11-14 NOTE — ED INITIAL ASSESSMENT (HPI)
C/o having dizziness for few weeks, states possible ear issues. Denies ear pain Feeling off balance when walking

## 2024-11-14 NOTE — ED PROVIDER NOTES
Patient Seen in: [facility]      History     Chief Complaint   Patient presents with    Dizziness     Stated Complaint: Headache - The option I need was not on list - been having dizziness for few we*    Subjective:   HPI      64 yo female with dizziness for the last few weeks. She says that intermittently when she changes positions or moves her head she gets dizzy. She describes somewhat of an off balance feeling. No headache. No new extremity numbness or weakness. She has been working with chiropractor on neck and left arm issues for some time and these symptoms are actually improving. No recent illness. No vomiting.     Objective:     Past Medical History:    Essential hypertension    High blood pressure    PONV (postoperative nausea and vomiting)    Thyroid disease    Visual impairment    GLASSES              Past Surgical History:   Procedure Laterality Date          2    Cholecystectomy      Colonoscopy      Hysterectomy  10/2018    Other surgical history      Carpal tunnel right hand     Other surgical history      Bilateral knee surgery     Other surgical history      Oral tooth extraction surgery     Other surgical history Bilateral     Breast implantation                Social History     Socioeconomic History    Marital status:    Tobacco Use    Smoking status: Never    Smokeless tobacco: Never   Vaping Use    Vaping status: Never Used   Substance and Sexual Activity    Alcohol use: Yes     Alcohol/week: 4.0 standard drinks of alcohol     Types: 4 Glasses of wine per week     Comment: moderate    Drug use: No   Other Topics Concern    Caffeine Concern No    Exercise Yes    Seat Belt Yes    Special Diet No    Stress Concern No    Weight Concern No              Review of Systems    Positive for stated complaint: Headache - The option I need was not on list - been having dizziness for few we*  Other systems are as noted in HPI.  Constitutional and vital signs reviewed.       All other systems reviewed and negative except as noted above.    Physical Exam     ED Triage Vitals [11/14/24 0859]   /71   Pulse 76   Resp 18   Temp 98 °F (36.7 °C)   Temp src Temporal   SpO2 97 %   O2 Device None (Room air)       Current Vitals:   Vital Signs  BP: 140/71  Pulse: 76  Resp: 18  Temp: 98 °F (36.7 °C)  Temp src: Temporal    Oxygen Therapy  SpO2: 97 %  O2 Device: None (Room air)        Physical Exam  Vitals and nursing note reviewed.   Constitutional:       Appearance: Normal appearance. She is well-developed.   HENT:      Head: Normocephalic and atraumatic.   Eyes:      Extraocular Movements: Extraocular movements intact.      Right eye: No nystagmus.      Left eye: No nystagmus.      Pupils: Pupils are equal, round, and reactive to light.   Cardiovascular:      Rate and Rhythm: Normal rate and regular rhythm.   Pulmonary:      Effort: Pulmonary effort is normal. No respiratory distress.      Breath sounds: Normal breath sounds.   Skin:     General: Skin is warm and dry.      Capillary Refill: Capillary refill takes less than 2 seconds.   Neurological:      General: No focal deficit present.      Mental Status: She is alert.      Cranial Nerves: No cranial nerve deficit, dysarthria or facial asymmetry.      Sensory: No sensory deficit.      Motor: No weakness.      Coordination: Coordination normal.      Gait: Gait normal.   Psychiatric:         Mood and Affect: Mood normal.         Behavior: Behavior normal.            ED Course   Labs Reviewed - No data to display                MDM           Medical Decision Making  Peripheral vs central vertigo. Exam findings and history consistent with peripheral vertigo. Discharge on meclizine and physicial therapy.     Disposition and Plan     Clinical Impression:  1. Peripheral vertigo, unspecified laterality         Disposition:  Discharge  11/14/2024  9:23 am    Follow-up:  Jasmina Heaton DO  [address]  [address]  [facility]  [address]  [phone]      As needed          Medications Prescribed:  Current Discharge Medication List        START taking these medications    Details   meclizine 25 MG Oral Tab Take 1 tablet (25 mg total) by mouth every 6 (six) hours as needed for Dizziness.  Qty: 20 tablet, Refills: 0                 Supplementary Documentation:

## 2024-11-19 ENCOUNTER — TELEPHONE (OUTPATIENT)
Dept: INTERNAL MEDICINE CLINIC | Facility: CLINIC | Age: 64
End: 2024-11-19

## 2024-11-19 DIAGNOSIS — E53.8 VITAMIN B12 DEFICIENCY: Primary | ICD-10-CM

## 2024-11-19 RX ORDER — CYANOCOBALAMIN 1000 UG/ML
1000 INJECTION, SOLUTION INTRAMUSCULAR; SUBCUTANEOUS
Status: SHIPPED | OUTPATIENT
Start: 2024-11-19

## 2024-11-19 NOTE — TELEPHONE ENCOUNTER
PT scheduled   Future Appointments   Date Time Provider Department Center   11/21/2024  7:30 AM PF [facility] PF OUTPT [facility]   11/21/2024  7:45 AM PF [facility] PF OUTPT [facility]   11/25/2024 10:00 AM  MR RM1 (1.5T WIDE) [facility]   11/26/2024  9:30 AM EMG 08 NURSE EMG 8 [facility] [place]   12/17/2024 10:00 AM Jasmina Heaton DO EMG 8 [facility]   12/24/2024  8:30 AM Chelly Neely, DPM EM[facility] 75 [facility]

## 2024-11-19 NOTE — TELEPHONE ENCOUNTER
Tried to call patient twice to schedule NV for b12. First time call was answered and lost connection. Second time connection was dropped again   Please schedule patient for NV when she calls back

## 2024-11-19 NOTE — TELEPHONE ENCOUNTER
Patient is wanting to know if SV is okay with her coming in for monthly B12 injections. Done with previous provider and it is something she would like to start doing again.   Please advise if okay?

## 2024-11-21 ENCOUNTER — LAB ENCOUNTER (OUTPATIENT)
Dept: LAB | Age: 64
End: 2024-11-21
Attending: PODIATRIST
Payer: COMMERCIAL

## 2024-11-21 ENCOUNTER — TELEPHONE (OUTPATIENT)
Dept: INTERNAL MEDICINE CLINIC | Facility: CLINIC | Age: 64
End: 2024-11-21

## 2024-11-21 DIAGNOSIS — I10 ESSENTIAL HYPERTENSION: Primary | ICD-10-CM

## 2024-11-21 NOTE — TELEPHONE ENCOUNTER
Patient called asking if any labs are needed can we please order them so she can do them prior to the appt

## 2024-11-21 NOTE — TELEPHONE ENCOUNTER
PER PRE-OP FAX RECEIVED ONLY H&P AND MEDICAL CLEARANCE IS REQUIRED  NO LABS OR EKG STATED ON THE FAX WE RECEIVED  PLEASE ADVICE

## 2024-11-21 NOTE — TELEPHONE ENCOUNTER
Dr. Neely's office calling to request pre op clearance for upcoming surgery on 12/20. They are aware that the pt has cancelled previous pre op appts but has upcoming pre op on 12/17.     Future Appointments   Date Time Provider Department Center   11/25/2024 10:00 AM  MR RM1 (1.5T WIDE) [facility]   11/26/2024  9:30 AM EMG 08 NURSE EMG 8 [facility] [place]   12/17/2024 10:00 AM Jasmina Heaton,  EMG 8 [facility] [place]   12/24/2024  8:30 AM Chelly Neely, DPM EM[facility] 75 [facility]

## 2024-11-25 ENCOUNTER — HOSPITAL ENCOUNTER (OUTPATIENT)
Dept: MRI IMAGING | Facility: HOSPITAL | Age: 64
Discharge: HOME | End: 2024-11-25
Attending: OBSTETRICS & GYNECOLOGY
Payer: COMMERCIAL

## 2024-11-25 PROCEDURE — A9575 INJ GADOTERATE MEGLUMI 0.1ML: HCPCS | Performed by: OBSTETRICS & GYNECOLOGY

## 2024-11-25 PROCEDURE — 77049 MRI BREAST C-+ W/CAD BI: CPT | Performed by: OBSTETRICS & GYNECOLOGY

## 2024-11-25 RX ORDER — GADOTERATE MEGLUMINE 376.9 MG/ML
15 INJECTION INTRAVENOUS
Status: COMPLETED | OUTPATIENT
Start: 2024-11-25 | End: 2024-11-25

## 2024-11-25 RX ADMIN — GADOTERATE MEGLUMINE 12 ML: 376.9 INJECTION INTRAVENOUS at 11:29:00

## 2024-11-26 ENCOUNTER — NURSE ONLY (OUTPATIENT)
Dept: INTERNAL MEDICINE CLINIC | Facility: CLINIC | Age: 64
End: 2024-11-26
Payer: COMMERCIAL

## 2024-11-26 ENCOUNTER — LAB ENCOUNTER (OUTPATIENT)
Dept: LAB | Age: 64
End: 2024-11-26
Attending: INTERNAL MEDICINE
Payer: COMMERCIAL

## 2024-11-26 DIAGNOSIS — I10 ESSENTIAL HYPERTENSION: ICD-10-CM

## 2024-11-26 LAB
ANION GAP SERPL CALC-SCNC: 6 MMOL/L (ref 0–18)
BUN BLD-MCNC: 11 MG/DL (ref 9–23)
CALCIUM BLD-MCNC: 9.4 MG/DL (ref 8.7–10.4)
CHLORIDE SERPL-SCNC: 107 MMOL/L (ref 98–112)
CO2 SERPL-SCNC: 26 MMOL/L (ref 21–32)
CREAT BLD-MCNC: 0.74 MG/DL
EGFRCR SERPLBLD CKD-EPI 2021: 92 ML/MIN/1.73M2 (ref 60–?)
FASTING STATUS PATIENT QL REPORTED: NO
GLUCOSE BLD-MCNC: 78 MG/DL (ref 70–99)
OSMOLALITY SERPL CALC.SUM OF ELEC: 286 MOSM/KG (ref 275–295)
POTASSIUM SERPL-SCNC: 4.4 MMOL/L (ref 3.5–5.1)
SODIUM SERPL-SCNC: 139 MMOL/L (ref 136–145)

## 2024-11-26 PROCEDURE — 80048 BASIC METABOLIC PNL TOTAL CA: CPT | Performed by: INTERNAL MEDICINE

## 2024-11-26 PROCEDURE — 96372 THER/PROPH/DIAG INJ SC/IM: CPT | Performed by: INTERNAL MEDICINE

## 2024-11-26 RX ADMIN — CYANOCOBALAMIN 1000 MCG: 1000 INJECTION, SOLUTION INTRAMUSCULAR; SUBCUTANEOUS at 09:57:00

## 2024-12-17 ENCOUNTER — OFFICE VISIT (OUTPATIENT)
Dept: INTERNAL MEDICINE CLINIC | Facility: CLINIC | Age: 64
End: 2024-12-17
Payer: COMMERCIAL

## 2024-12-17 ENCOUNTER — TELEPHONE (OUTPATIENT)
Dept: INTERNAL MEDICINE CLINIC | Facility: CLINIC | Age: 64
End: 2024-12-17

## 2024-12-17 VITALS
RESPIRATION RATE: 16 BRPM | WEIGHT: 133 LBS | BODY MASS INDEX: 21.38 KG/M2 | OXYGEN SATURATION: 98 % | SYSTOLIC BLOOD PRESSURE: 126 MMHG | HEIGHT: 66 IN | DIASTOLIC BLOOD PRESSURE: 74 MMHG | TEMPERATURE: 99 F | HEART RATE: 90 BPM

## 2024-12-17 DIAGNOSIS — I10 ESSENTIAL HYPERTENSION: ICD-10-CM

## 2024-12-17 DIAGNOSIS — Z01.818 PREOP EXAM FOR INTERNAL MEDICINE: Primary | ICD-10-CM

## 2024-12-17 DIAGNOSIS — F51.01 PRIMARY INSOMNIA: ICD-10-CM

## 2024-12-17 PROCEDURE — 3078F DIAST BP <80 MM HG: CPT | Performed by: INTERNAL MEDICINE

## 2024-12-17 PROCEDURE — 99213 OFFICE O/P EST LOW 20 MIN: CPT | Performed by: INTERNAL MEDICINE

## 2024-12-17 PROCEDURE — 3008F BODY MASS INDEX DOCD: CPT | Performed by: INTERNAL MEDICINE

## 2024-12-17 PROCEDURE — 3074F SYST BP LT 130 MM HG: CPT | Performed by: INTERNAL MEDICINE

## 2024-12-17 RX ORDER — LOSARTAN POTASSIUM 25 MG/1
25 TABLET ORAL EVERY MORNING
Qty: 90 TABLET | Refills: 1 | Status: SHIPPED | OUTPATIENT
Start: 2024-12-17

## 2024-12-17 RX ORDER — ZOLPIDEM TARTRATE 5 MG/1
2.5 TABLET ORAL NIGHTLY PRN
Qty: 30 TABLET | Refills: 3 | Status: SHIPPED | OUTPATIENT
Start: 2024-12-17 | End: 2024-12-23

## 2024-12-17 NOTE — TELEPHONE ENCOUNTER
losartan 25 MG Oral Tab         Sig: Take 1 tablet (25 mg total) by mouth every morning.    Disp: 90 tablet    Refills: 1    Start: 12/17/2024    Class: Normal    For: Essential hypertension    To pharmacy: ZERO refills remain on this prescription. Your patient is requesting advance approval of refills for this medication to PREVENT ANY MISSED DOSES    Last ordered: 5 months ago (7/2/2024) by Edward Gamboa MD    Hypertension Medications Protocol [id number]/17/2024 11:46 AM   Protocol Details CMP or BMP in past 12 months    Last BP reading less than 140/90    In person appointment or virtual visit in the past 12 mos or appointment in next 3 mos    EGFRCR or GFRNAA > 50      To be filled at: [facility] #[id number] [place], [place] - [address] AT [facility] [address], [phone], [phone]

## 2024-12-17 NOTE — TELEPHONE ENCOUNTER
I see 30 tablets sent. Maybe the pharmacy is decreasing the amount. I have sent the 30 again    Jasmina Heaton DO

## 2024-12-17 NOTE — H&P (VIEW-ONLY)
Preoperative evaluation    Chief Complaint   Patient presents with    Pre-Op Exam       Mone Wolff is a 63 year old female who presents for a pre-operative physical exam.   Mone Wolff is scheduled for a right metatarsophalangeal Joint Cheilectomy procedure at [facility] on 12/20/24 performed by Dr Neely, who has requested that I provide pre-operative consultation before this procedure.   HPI related to surgery:   She  has had previous anesthesia:  Yes.  Previous complications:  No  Patient has good functional status (>4 METS).  No active anginal symptoms, no history of arrhythmias, coronary artery disease, valvular disease.    Hypertension: Well-controlled  REVIEW OF SYSTEMS:   GENERAL/ const: no fevers/chills. feels well, no weight loss  SKIN: denies any unusual skin lesions  EYES:no vision problems  HEENT: denies sinus pain or sinus tenderness  LUNGS: denies shortness of breath   CARDIOVASCULAR: denies chest pain  GI: denies nausea/emesis/ abdominal pain diarrhea constipation  : denies dysuria   MUSCULOSKELETAL: Chronic foot pain  NEURO: denies headaches  PSYCHIATRIC: denies issues  ENDOCRINE: no hot/cold intolerance  ALLERGY: Allergies[1]  PAST HISTORY:     Current Outpatient Medications:     zolpidem 5 MG Oral Tab, Take 0.5 tablets (2.5 mg total) by mouth nightly as needed for Sleep., Disp: 30 tablet, Rfl: 0    diazePAM 5 MG Oral Tab, Take 1 tablet twice a day by oral route as needed for 30 days, for anxiety., Disp: , Rfl:     losartan 25 MG Oral Tab, Take 1 tablet (25 mg total) by mouth every morning., Disp: 90 tablet, Rfl: 1    meclizine 25 MG Oral Tab, Take 1 tablet (25 mg total) by mouth every 6 (six) hours as needed for Dizziness., Disp: 20 tablet, Rfl: 0  Medical:  has a past medical history of Essential hypertension, High blood pressure, PONV (postoperative nausea and vomiting), Thyroid disease, and Visual impairment.  Surgical:  has a past surgical history that includes cholecystectomy;  ; other surgical history; other surgical history; other surgical history; other surgical history (Bilateral); hysterectomy (10/2018); and colonoscopy.  Family: family history includes Alcoholism in her father; Ankylosing spondylitis in her father; Cancer in her mother; Heart Disorder in her mother; Hypertension in her brother, father, mother, and sister; Parkinson's Dosease in her father; Ruptured cerebral aneurysm in her paternal grandfather; Stroke in her father and paternal grandmother; gout in her father.  Social:   Social History     Socioeconomic History    Marital status:    Tobacco Use    Smoking status: Never    Smokeless tobacco: Never   Vaping Use    Vaping status: Never Used   Substance and Sexual Activity    Alcohol use: Yes     Alcohol/week: 4.0 standard drinks of alcohol     Types: 4 Glasses of wine per week     Comment: moderate    Drug use: No   Other Topics Concern    Caffeine Concern No    Exercise Yes    Seat Belt Yes    Special Diet No    Stress Concern No    Weight Concern No     PHYSICAL EXAM:   /74 (BP Location: Right arm, Patient Position: Sitting, Cuff Size: adult)   Pulse 90   Temp 98.6 °F (37 °C) (Temporal)   Resp 16   Ht 5' 6\" (1.676 m)   Wt 133 lb (60.3 kg)   LMP 2016 (Approximate)   SpO2 98%   BMI 21.47 kg/m²    GENERAL: Alert and oriented, well developed, well nourished,in no apparent distress  SKIN: no rashes,no suspicious lesions  HEENT: atraumatic, PERRLA, EOMI, normal lid and conjunctiva  NECK: supple, no jvd, no thyromegaly  LUNGS: clear to auscultation bilaterally, no wheezing/rubs  CARDIO: RRR without murmurs.  No clubbing, cyanosis or edema.  GI: soft non tender nondistended no hepatosplenomegaly, bowel sounds throughout  NEURO: CN II-XII intact  PSYCH: pleasant, appropriate mood and affect  LABORATORY DATA:   Noted     ASSESSMENT AND PLAN:   1. Preop exam for internal medicine  Note: Patient presents for pre-operative examination at the  request of performing surgeon.  Patient has good functional status (>4 METS).  No active anginal symptoms, no history of arrhythmias, coronary artery disease, valvular disease.  RCRI score of 0,  Class I risk, 3.9% risk of major cardiac event.  She is considered a low risk patient undergoing a moderate risk procedure, and is ok to proceed with surgery.  Note and pertinent pre-operative testing results will be faxed to referring surgeon's office and/or surgical center as requested.    2. Essential hypertension  Note: Continue losartan        Patient Care Team:  Jasmina Heaton DO as PCP - General (Internal Medicine)  Vickey Contreras MD as Consulting Physician (ENDOCRINOLOGY)  Ginette Irene MD as Consulting Physician (NEUROLOGY)  The patient indicates understanding of these issues and agrees to the plan.  The patient is asked to return to clinic in 6 months with Dr. Jasmina Heaton DO for follow up on chronic issues, or earlier if acute issues arise.  Jasmina Heaton DO          [1]   Allergies  Allergen Reactions    Dust Mites Runny nose and Tightness in Throat    Adhesive Tape RASH

## 2024-12-17 NOTE — PROGRESS NOTES
Preoperative evaluation    Chief Complaint   Patient presents with    Pre-Op Exam       Mone Wolff is a 63 year old female who presents for a pre-operative physical exam.   Mone Wolff is scheduled for a right metatarsophalangeal Joint Cheilectomy procedure at [place] on 12/20/24 performed by Dr Neely, who has requested that I provide pre-operative consultation before this procedure.   HPI related to surgery:   She  has had previous anesthesia:  Yes.  Previous complications:  No  Patient has good functional status (>4 METS).  No active anginal symptoms, no history of arrhythmias, coronary artery disease, valvular disease.    Hypertension: Well-controlled  REVIEW OF SYSTEMS:   GENERAL/ const: no fevers/chills. feels well, no weight loss  SKIN: denies any unusual skin lesions  EYES:no vision problems  HEENT: denies sinus pain or sinus tenderness  LUNGS: denies shortness of breath   CARDIOVASCULAR: denies chest pain  GI: denies nausea/emesis/ abdominal pain diarrhea constipation  : denies dysuria   MUSCULOSKELETAL: Chronic foot pain  NEURO: denies headaches  PSYCHIATRIC: denies issues  ENDOCRINE: no hot/cold intolerance  ALLERGY: Allergies[1]  PAST HISTORY:     Current Outpatient Medications:     zolpidem 5 MG Oral Tab, Take 0.5 tablets (2.5 mg total) by mouth nightly as needed for Sleep., Disp: 30 tablet, Rfl: 0    diazePAM 5 MG Oral Tab, Take 1 tablet twice a day by oral route as needed for 30 days, for anxiety., Disp: , Rfl:     losartan 25 MG Oral Tab, Take 1 tablet (25 mg total) by mouth every morning., Disp: 90 tablet, Rfl: 1    meclizine 25 MG Oral Tab, Take 1 tablet (25 mg total) by mouth every 6 (six) hours as needed for Dizziness., Disp: 20 tablet, Rfl: 0  Medical:  has a past medical history of Essential hypertension, High blood pressure, PONV (postoperative nausea and vomiting), Thyroid disease, and Visual impairment.  Surgical:  has a past surgical history that includes cholecystectomy;  ; other surgical history; other surgical history; other surgical history; other surgical history (Bilateral); hysterectomy (10/2018); and colonoscopy.  Family: family history includes Alcoholism in her father; Ankylosing spondylitis in her father; Cancer in her mother; Heart Disorder in her mother; Hypertension in her brother, father, mother, and sister; Parkinson's Dosease in her father; Ruptured cerebral aneurysm in her paternal grandfather; Stroke in her father and paternal grandmother; gout in her father.  Social:   Social History     Socioeconomic History    Marital status:    Tobacco Use    Smoking status: Never    Smokeless tobacco: Never   Vaping Use    Vaping status: Never Used   Substance and Sexual Activity    Alcohol use: Yes     Alcohol/week: 4.0 standard drinks of alcohol     Types: 4 Glasses of wine per week     Comment: moderate    Drug use: No   Other Topics Concern    Caffeine Concern No    Exercise Yes    Seat Belt Yes    Special Diet No    Stress Concern No    Weight Concern No     PHYSICAL EXAM:   /74 (BP Location: Right arm, Patient Position: Sitting, Cuff Size: adult)   Pulse 90   Temp 98.6 °F (37 °C) (Temporal)   Resp 16   Ht 5' 6\" (1.676 m)   Wt 133 lb (60.3 kg)   LMP 2016 (Approximate)   SpO2 98%   BMI 21.47 kg/m²    GENERAL: Alert and oriented, well developed, well nourished,in no apparent distress  SKIN: no rashes,no suspicious lesions  HEENT: atraumatic, PERRLA, EOMI, normal lid and conjunctiva  NECK: supple, no jvd, no thyromegaly  LUNGS: clear to auscultation bilaterally, no wheezing/rubs  CARDIO: RRR without murmurs.  No clubbing, cyanosis or edema.  GI: soft non tender nondistended no hepatosplenomegaly, bowel sounds throughout  NEURO: CN II-XII intact  PSYCH: pleasant, appropriate mood and affect  LABORATORY DATA:   Noted     ASSESSMENT AND PLAN:   1. Preop exam for internal medicine  Note: Patient presents for pre-operative examination at the  request of performing surgeon.  Patient has good functional status (>4 METS).  No active anginal symptoms, no history of arrhythmias, coronary artery disease, valvular disease.  RCRI score of 0,  Class I risk, 3.9% risk of major cardiac event.  She is considered a low risk patient undergoing a moderate risk procedure, and is ok to proceed with surgery.  Note and pertinent pre-operative testing results will be faxed to referring surgeon's office and/or surgical center as requested.    2. Essential hypertension  Note: Continue losartan        Patient Care Team:  Jasmina Heaton DO as PCP - General (Internal Medicine)  Vickey Contreras MD as Consulting Physician (ENDOCRINOLOGY)  Ginette Irene MD as Consulting Physician (NEUROLOGY)  The patient indicates understanding of these issues and agrees to the plan.  The patient is asked to return to clinic in 6 months with Dr. Jasmina Heaton DO for follow up on chronic issues, or earlier if acute issues arise.  Jasmina Heaton DO          [1]   Allergies  Allergen Reactions    Dust Mites Runny nose and Tightness in Throat    Adhesive Tape RASH

## 2024-12-17 NOTE — TELEPHONE ENCOUNTER
Pt was calling to make sure when [facility] sends in the Zolpidem we are sending 30 tablets and that she will make sure she cuts them in half that the last time only 15 tablets were dispensed and that is needs to be refilled more quickly that way

## 2024-12-18 NOTE — TELEPHONE ENCOUNTER
Patient called the office stating that her pharmacy has not received the Zolpidem, informed that it was sent yesterday. Patient states that the pharmacy has not received the order. Please advise.

## 2024-12-18 NOTE — DISCHARGE INSTRUCTIONS
Dr GUNDERSON INSTRUCTIONS    Elevate foot  Ice ankle area or back of knee area for 15 minutes, three times daily  Do not remove inner bandage.  Can remove ace wrap if needed.  Weight bearing status - full weight allowed   Use post op shoe  Take pain meds as directed.        HOME INSTRUCTIONS  [facility] HOME CARE INSTRUCTIONS: POST-OP ANESTHESIA  The medication that you received for sedation or general anesthesia can last up to 24 hours. Your judgment and reflexes may be altered, even if you feel like your normal self.      We Recommend:   Do not drive any motor vehicle or bicycle   Avoid mowing the lawn, playing sports, or working with power tools/applicances (power saws, electric knives or mixers)   That you have someone stay with you on your first night home   Do not drink alcohol or take sleeping pills or tranquilizers   Do not sign legal documents within 24 hours of your procedure   If you had a nerve block for your surgery, take extra care not to put any pressure on your arm or hand for 24 hours    It is normal:  For you to have a sore throat if you had a breathing tube during surgery (while you were asleep!). The sore throat should get better within 48 hours. You can gargle with warm salt water (1/2 tsp in 4 oz warm water) or use a throat lozenge for comfort  To feel muscle aches or soreness especially in the abdomen, chest or neck. The achy feeling should go away in the next 24 hours  To feel weak, sleepy or \"wiped out\". Your should start feeling better in the next 24 hours.   To experience mild discomforts such as sore lip or tongue, headache, cramps, gas pains or a bloated feeling in your abdomen.   To experience mild back pain or soreness for a day or two if you had spinal or epidural anesthesia.   If you had laparoscopic surgery, to feel shoulder pain or discomfort on the day of surgery.   For some patients to have nausea after surgery/anesthesia    If you feel nausea or experience vomiting:   Try to move  around less.   Eat less than usual or drink only liquids until the next morning   Nausea should resolve in about 24 hours    If you have a problem when you are at home:    Call your surgeons office   Discharge Instructions: After Your Surgery  You’ve just had surgery. During surgery, you were given medicine called anesthesia to keep you relaxed and free of pain. After surgery, you may have some pain or nausea. This is common. Here are some tips for feeling better and getting well after surgery.   Going home  Your healthcare provider will show you how to take care of yourself when you go home. They'll also answer your questions. Have an adult family member or friend drive you home. For the first 24 hours after your surgery:   Don't drive or use heavy equipment.  Don't make important decisions or sign legal papers.  Take medicines as directed.  Don't drink alcohol.  Have someone stay with you, if needed. They can watch for problems and help keep you safe.  Be sure to go to all follow-up visits with your healthcare provider. And rest after your surgery for as long as your provider tells you to.   Coping with pain  If you have pain after surgery, pain medicine will help you feel better. Take it as directed, before pain becomes severe. Also, ask your healthcare provider or pharmacist about other ways to control pain. This might be with heat, ice, or relaxation. And follow any other instructions your surgeon or nurse gives you.      Stay on schedule with your medicine.     Tips for taking pain medicine  To get the best relief possible, remember these points:   Pain medicines can upset your stomach. Taking them with a little food may help.  Most pain relievers taken by mouth need at least 20 to 30 minutes to start to work.  Don't wait till your pain becomes severe before you take your medicine. Try to time your medicine so that you can take it before starting an activity. This might be before you get dressed, go for a  walk, or sit down for dinner.  Constipation is a common side effect of some pain medicines. Call your healthcare provider before taking any medicines such as laxatives or stool softeners to help ease constipation. Also ask if you should skip any foods. Drinking lots of fluids and eating foods such as fruits and vegetables that are high in fiber can also help. Remember, don't take laxatives unless your surgeon has prescribed them.  Drinking alcohol and taking pain medicine can cause dizziness and slow your breathing. It can even be deadly. Don't drink alcohol while taking pain medicine.  Pain medicine can make you react more slowly to things. Don't drive or run machinery while taking pain medicine.  Your healthcare provider may tell you to take acetaminophen to help ease your pain. Ask them how much you're supposed to take each day. Acetaminophen or other pain relievers may interact with your prescription medicines or other over-the-counter (OTC) medicines. Some prescription medicines have acetaminophen and other ingredients in them. Using both prescription and OTC acetaminophen for pain can cause you to accidentally overdose. Read the labels on your OTC medicines with care. This will help you to clearly know the list of ingredients, how much to take, and any warnings. It may also help you not take too much acetaminophen. If you have questions or don't understand the information, ask your pharmacist or healthcare provider to explain it to you before you take the OTC medicine.   Managing nausea  Some people have an upset stomach (nausea) after surgery. This is often because of anesthesia, pain, or pain medicine, less movement of food in the stomach, or the stress of surgery. These tips will help you handle nausea and eat healthy foods as you get better. If you were on a special food plan before surgery, ask your healthcare provider if you should follow it while you get better. Check with your provider on how your  eating should progress. It may depend on the surgery you had. These general tips may help:   Don't push yourself to eat. Your body will tell you when to eat and how much.  Start off with clear liquids and soup. They're easier to digest.  Next try semi-solid foods as you feel ready. These include mashed potatoes, applesauce, and gelatin.  Slowly move to solid foods. Don’t eat fatty, rich, or spicy foods at first.  Don't force yourself to have 3 large meals a day. Instead eat smaller amounts more often.  Take pain medicines with a small amount of solid food, such as crackers or toast. This helps prevent nausea.  When to call your healthcare provider  Call your healthcare provider right away if you have any of these:   You still have too much pain, or the pain gets worse, after taking the medicine. The medicine may not be strong enough. Or there may be a complication from the surgery.  You feel too sleepy, dizzy, or groggy. The medicine may be too strong.  Side effects such as nausea or vomiting. Your healthcare provider may advise taking other medicines to .  Skin changes such as rash, itching, or hives. This may mean you have an allergic reaction. Your provider may advise taking other medicines.  The incision looks different (for instance, part of it opens up).  Bleeding or fluid leaking from the incision site, and weren't told to expect that.  Fever of 100.4°F (38°C) or higher, or as directed by your provider.  Call 911  Call 911 right away if you have:   Trouble breathing  Facial swelling    If you have obstructive sleep apnea   You were given anesthesia medicine during surgery to keep you comfortable and free of pain. After surgery, you may have more apnea spells because of this medicine and other medicines you were given. The spells may last longer than normal.    At home:  Keep using the continuous positive airway pressure (CPAP) device when you sleep. Unless your healthcare provider tells you not to, use it  when you sleep, day or night. CPAP is a common device used to treat obstructive sleep apnea.  Talk with your provider before taking any pain medicine, muscle relaxants, or sedatives. Your provider will tell you about the possible dangers of taking these medicines.  Contact your provider if your sleeping changes a lot even when taking medicines as directed.  [employer] last reviewed this educational content on 10/1/2021  © 0973-5582 [employer]. All rights reserved. This information is not intended as a substitute for professional medical care. Always follow your healthcare professional's instructions.

## 2024-12-18 NOTE — TELEPHONE ENCOUNTER
Called pharmacy, rx is ready to be picked up  Pharmacy is only dispensing 15 pills since that is all that insurance is going to cover per month.

## 2024-12-20 ENCOUNTER — ANESTHESIA EVENT (OUTPATIENT)
Dept: SURGERY | Facility: HOSPITAL | Age: 64
End: 2024-12-20
Payer: COMMERCIAL

## 2024-12-20 ENCOUNTER — ANESTHESIA (OUTPATIENT)
Dept: SURGERY | Facility: HOSPITAL | Age: 64
End: 2024-12-20
Payer: COMMERCIAL

## 2024-12-20 ENCOUNTER — HOSPITAL ENCOUNTER (OUTPATIENT)
Facility: HOSPITAL | Age: 64
Setting detail: HOSPITAL OUTPATIENT SURGERY
Discharge: HOME | End: 2024-12-20
Attending: PODIATRIST | Admitting: PODIATRIST
Payer: COMMERCIAL

## 2024-12-20 VITALS
DIASTOLIC BLOOD PRESSURE: 81 MMHG | WEIGHT: 133 LBS | HEART RATE: 58 BPM | TEMPERATURE: 99 F | OXYGEN SATURATION: 100 % | SYSTOLIC BLOOD PRESSURE: 168 MMHG | RESPIRATION RATE: 18 BRPM | HEIGHT: 66 IN | BODY MASS INDEX: 21.38 KG/M2

## 2024-12-20 DIAGNOSIS — M21.619 BUNION: Primary | ICD-10-CM

## 2024-12-20 PROCEDURE — 20550 NJX 1 TENDON SHEATH/LIGAMENT: CPT | Performed by: PODIATRIST

## 2024-12-20 PROCEDURE — 0QBN0ZZ EXCISION OF RIGHT METATARSAL, OPEN APPROACH: ICD-10-PCS | Performed by: PODIATRIST

## 2024-12-20 PROCEDURE — 28289 CORRJ HALUX RIGDUS W/O IMPLT: CPT | Performed by: PODIATRIST

## 2024-12-20 RX ORDER — SODIUM CHLORIDE, SODIUM LACTATE, POTASSIUM CHLORIDE, CALCIUM CHLORIDE 600; 310; 30; 20 MG/100ML; MG/100ML; MG/100ML; MG/100ML
INJECTION, SOLUTION INTRAVENOUS CONTINUOUS
Status: DISCONTINUED | OUTPATIENT
Start: 2024-12-20 | End: 2024-12-20

## 2024-12-20 RX ORDER — LIDOCAINE HYDROCHLORIDE 10 MG/ML
INJECTION, SOLUTION EPIDURAL; INFILTRATION; INTRACAUDAL; PERINEURAL AS NEEDED
Status: DISCONTINUED | OUTPATIENT
Start: 2024-12-20 | End: 2024-12-20 | Stop reason: SURG

## 2024-12-20 RX ORDER — NALOXONE HYDROCHLORIDE 0.4 MG/ML
0.08 INJECTION, SOLUTION INTRAMUSCULAR; INTRAVENOUS; SUBCUTANEOUS AS NEEDED
Status: DISCONTINUED | OUTPATIENT
Start: 2024-12-20 | End: 2024-12-20

## 2024-12-20 RX ORDER — ONDANSETRON 2 MG/ML
INJECTION INTRAMUSCULAR; INTRAVENOUS AS NEEDED
Status: DISCONTINUED | OUTPATIENT
Start: 2024-12-20 | End: 2024-12-20 | Stop reason: SURG

## 2024-12-20 RX ORDER — LIDOCAINE HYDROCHLORIDE 10 MG/ML
INJECTION, SOLUTION EPIDURAL; INFILTRATION; INTRACAUDAL; PERINEURAL AS NEEDED
Status: DISCONTINUED | OUTPATIENT
Start: 2024-12-20 | End: 2024-12-20 | Stop reason: HOSPADM

## 2024-12-20 RX ORDER — HYDROCODONE BITARTRATE AND ACETAMINOPHEN 5; 325 MG/1; MG/1
1 TABLET ORAL EVERY 6 HOURS PRN
Qty: 20 TABLET | Refills: 0 | Status: SHIPPED | OUTPATIENT
Start: 2024-12-20

## 2024-12-20 RX ORDER — BUPIVACAINE HYDROCHLORIDE 5 MG/ML
INJECTION, SOLUTION EPIDURAL; INTRACAUDAL AS NEEDED
Status: DISCONTINUED | OUTPATIENT
Start: 2024-12-20 | End: 2024-12-20 | Stop reason: HOSPADM

## 2024-12-20 RX ORDER — DEXAMETHASONE SODIUM PHOSPHATE 4 MG/ML
VIAL (ML) INJECTION AS NEEDED
Status: DISCONTINUED | OUTPATIENT
Start: 2024-12-20 | End: 2024-12-20 | Stop reason: SURG

## 2024-12-20 RX ORDER — ACETAMINOPHEN 500 MG
1000 TABLET ORAL ONCE
Status: COMPLETED | OUTPATIENT
Start: 2024-12-20 | End: 2024-12-20

## 2024-12-20 RX ORDER — EPHEDRINE SULFATE 50 MG/ML
INJECTION INTRAVENOUS AS NEEDED
Status: DISCONTINUED | OUTPATIENT
Start: 2024-12-20 | End: 2024-12-20 | Stop reason: SURG

## 2024-12-20 RX ORDER — DEXAMETHASONE SODIUM PHOSPHATE 4 MG/ML
VIAL (ML) INJECTION AS NEEDED
Status: DISCONTINUED | OUTPATIENT
Start: 2024-12-20 | End: 2024-12-20 | Stop reason: HOSPADM

## 2024-12-20 RX ORDER — HYDROMORPHONE HYDROCHLORIDE 1 MG/ML
0.4 INJECTION, SOLUTION INTRAMUSCULAR; INTRAVENOUS; SUBCUTANEOUS EVERY 5 MIN PRN
Status: DISCONTINUED | OUTPATIENT
Start: 2024-12-20 | End: 2024-12-20

## 2024-12-20 RX ADMIN — EPHEDRINE SULFATE 10 MG: 50 INJECTION INTRAVENOUS at 15:17:00

## 2024-12-20 RX ADMIN — ONDANSETRON 4 MG: 2 INJECTION INTRAMUSCULAR; INTRAVENOUS at 15:10:00

## 2024-12-20 RX ADMIN — LIDOCAINE HYDROCHLORIDE 50 MG: 10 INJECTION, SOLUTION EPIDURAL; INFILTRATION; INTRACAUDAL; PERINEURAL at 14:53:00

## 2024-12-20 RX ADMIN — DEXAMETHASONE SODIUM PHOSPHATE 4 MG: 4 MG/ML VIAL (ML) INJECTION at 15:17:00

## 2024-12-20 NOTE — INTERVAL H&P NOTE
Pre-op Diagnosis: Hallux limitus of right foot [M20.5X1]    The above referenced H&P was reviewed by Chelly Neely DPM on 12/20/2024, the patient was examined and no significant changes have occurred in the patient's condition since the H&P was performed.  I discussed with the patient and/or legal representative the potential benefits, risks and side effects of this procedure; the likelihood of the patient achieving goals; and potential problems that might occur during recuperation.  I discussed reasonable alternatives to the procedure, including risks, benefits and side effects related to the alternatives and risks related to not receiving this procedure.  We will proceed with procedure as planned.

## 2024-12-20 NOTE — ANESTHESIA PREPROCEDURE EVALUATION
Anesthesia PreOp Note    HPI:     Mone Wolff is a 63 year old female who presents for preoperative consultation requested by: Chelly Neely DPM    Date of Surgery: 2024    Procedure(s):  Right Metatarsophalangeal Joint Cheilectomy, steroid injection of left heel  Indication: Hallux limitus of right foot [M20.5X1]    Relevant Problems   No relevant active problems       NPO:  Last Liquid Consumption Date: 24  Last Liquid Consumption Time: 0800 (gatorade)  Last Solid Consumption Date: 24  Last Solid Consumption Time:   Last Liquid Consumption Date: 24          History Review:  Patient Active Problem List    Diagnosis Date Noted    Positive GARRET (antinuclear antibody) 2024    Post-menopausal 2024    History of hypoglycemia 2022    Small fiber neuropathy 2018    Antiphospholipid antibody positive 08/10/2017    Essential hypertension 2011    Anxiety 2003       Past Medical History:    Essential hypertension    High blood pressure    PONV (postoperative nausea and vomiting)    Thyroid disease    Visual impairment    GLASSES       Past Surgical History:   Procedure Laterality Date          2    Cholecystectomy      Colonoscopy      Hysterectomy  10/2018    Other surgical history      Carpal tunnel right hand     Other surgical history      Bilateral knee surgery     Other surgical history      Oral tooth extraction surgery     Other surgical history Bilateral     Breast implantation       Prescriptions Prior to Admission[1]  Current Medications and Prescriptions Ordered in Epic[2]    Allergies[3]    Family History   Problem Relation Age of Onset    Hypertension Father     Stroke Father     Other (Parkinson's Dosease) Father     Other (Alcoholism) Father     Other (gout) Father     Other (Ankylosing spondylitis ) Father     Cancer Mother         uterine    Hypertension Mother     Heart Disorder Mother         heart attack    Hypertension  Sister     Hypertension Brother     Stroke Paternal Grandmother     Other (Ruptured cerebral aneurysm) Paternal Grandfather      Social History     Socioeconomic History    Marital status:    Tobacco Use    Smoking status: Never    Smokeless tobacco: Never   Vaping Use    Vaping status: Never Used   Substance and Sexual Activity    Alcohol use: Yes     Alcohol/week: 4.0 standard drinks of alcohol     Types: 4 Glasses of wine per week     Comment: moderate    Drug use: No   Other Topics Concern    Caffeine Concern No    Exercise Yes    Seat Belt Yes    Special Diet No    Stress Concern No    Weight Concern No       Available pre-op labs reviewed.     Lab Results   Component Value Date     11/26/2024    K 4.4 11/26/2024     11/26/2024    CO2 26.0 11/26/2024    BUN 11 11/26/2024    CREATSERUM 0.74 11/26/2024    GLU 78 11/26/2024    CA 9.4 11/26/2024          Vital Signs:  Body mass index is 21.47 kg/m².   height is 1.676 m (5' 6\") and weight is 60.3 kg (133 lb). Her blood pressure is 150/90 and her pulse is 55. Her respiration is 16 and oxygen saturation is 98%.   Vitals:    12/17/24 1125 12/20/24 1313   BP:  150/90   Pulse:  55   Resp:  16   SpO2:  98%   Weight: 58.1 kg (128 lb) 60.3 kg (133 lb)   Height: 1.676 m (5' 6\") 1.676 m (5' 6\")        Anesthesia Evaluation     Patient summary reviewed    History of anesthetic complications   Airway   Mallampati: II  TM distance: >3 FB  Neck ROM: full  Dental      Pulmonary    Cardiovascular   Exercise tolerance: good  (+) hypertension well controlled    NYHA Classification: I    Neuro/Psych    (+)  neuromuscular disease, anxiety/panic attacks,        GI/Hepatic/Renal - negative ROS     Endo/Other - negative ROS   Abdominal                  Anesthesia Plan:   ASA:  2  Plan:   MAC  Informed Consent Plan and Risks Discussed With:  Patient      I have informed Mone Garcialing and/or legal guardian or family member of the nature of the anesthetic plan, benefits,  risks including possible dental damage if relevant, major complications, and any alternative forms of anesthetic management.   All of the patient's questions were answered to the best of my ability. The patient desires the anesthetic management as planned.  Malvin Tubbs MD  12/20/2024 1:43 PM  Present on Admission:  **None**           [1]   Facility-Administered Medications Prior to Admission   Medication Dose Route Frequency Provider Last Rate Last Admin    cyanocobalamin (Vitamin B12) 1000 MCG/ML injection 1,000 mcg  1,000 mcg Intramuscular Q30 Days    1,000 mcg at 11/26/24 0957     Medications Prior to Admission   Medication Sig Dispense Refill Last Dose/Taking    acetaminophen 325 MG Rectal Suppos Place 2 suppositories (650 mg total) rectally every 4 (four) hours as needed for Pain.   Past Week    zolpidem 5 MG Oral Tab Take 0.5 tablets (2.5 mg total) by mouth nightly as needed for Sleep. 30 tablet 3 Past Week    losartan 25 MG Oral Tab Take 1 tablet (25 mg total) by mouth every morning. 90 tablet 1 12/19/2024    diazePAM 5 MG Oral Tab Take 1 tablet twice a day by oral route as needed for 30 days, for anxiety.   12/18/2024   [2]   Current Facility-Administered Medications Ordered in Epic   Medication Dose Route Frequency Provider Last Rate Last Admin    lactated ringers infusion   Intravenous Continuous Chelly Neely DPM 20 mL/hr at 12/20/24 1326 New Bag at 12/20/24 1326     No current [facility]-ordered outpatient medications on file.   [3]   Allergies  Allergen Reactions    Dust Mites Runny nose and Tightness in Throat    Adhesive Tape RASH

## 2024-12-20 NOTE — TELEPHONE ENCOUNTER
Pt calling regarding her script for zolpidem. Pt does not want to take 1/2 a pill and would like a new script for her to take a full pill of 5 mg with 30 day supply.

## 2024-12-20 NOTE — ANESTHESIA POSTPROCEDURE EVALUATION
Patient: Mone ACOSTA Volling    Procedure Summary       Date: 12/20/24 Room / Location: [facility] MAIN OR 03 / [facility] MAIN OR    Anesthesia Start: 1445 Anesthesia Stop: 1605    Procedure: Right Metatarsophalangeal Joint Cheilectomy, steroid injection of left heel (Right: Foot) Diagnosis:       Hallux limitus of right foot      (Hallux limitus of right foot [M20.5X1])    Surgeons: Chelly Neely DPM Anesthesiologist: Malvin Tubbs MD    Anesthesia Type: MAC ASA Status: 2            Anesthesia Type: No value filed.    Vitals Value Taken Time   /92 12/20/24 1609   Temp 97 °F (36.1 °C) 12/20/24 1547   Pulse 57 12/20/24 1610   Resp 18 12/20/24 1610   SpO2 100 % 12/20/24 1610   Vitals shown include unfiled device data.    [facility] AN Post Evaluation:   Patient Evaluated in PACU  Patient Participation: complete - patient participated  Level of Consciousness: awake  Pain Score: 0  Pain Management: adequate  Airway Patency:patent  Yes    Nausea/Vomiting: none  Cardiovascular Status: acceptable  Respiratory Status: acceptable  Postoperative Hydration acceptable      Malvin Tubbs MD  12/20/2024 4:11 PM

## 2024-12-20 NOTE — BRIEF OP NOTE
Pre-Operative Diagnosis: Hallux limitus of right foot [M20.5X1]   plantar fascitis left heel  Post-Operative Diagnosis: Hallux limitus of right foot [M20.5X1]    plantar fascitis left heel  Procedure Performed:   Right Metatarsophalangeal Joint Cheilectomy, steroid injection of left heel    Surgeons and Role:     * Chelly Neely DPM - Primary    Assistant(s):  Surgical Assistant.: Jovany Mcgee     Surgical Findings: joint arthritis     Specimen: no     Estimated Blood Loss: less than 5 cc    Dictation Number:      Chelly Neely DPM  12/20/2024  3:48 PM

## 2024-12-21 NOTE — OPERATIVE REPORT
OPERATIVE REPORT     Mone Wolff Patient Status:  Hospital Outpatient Surgery    1960 MRN [id number]   Location [facility] PRE OP RECOVERY Attending No att. providers found   Hosp Day # 0 PCP Jasmina Heaton DO     Date of Surgery:  2024    Preoperative Diagnosis: Hallux limitus of right foot [M20.5X1]  Plantar fascitis left heel  Postoperative Diagnosis: same    Primary Surgeon: Chelly Neely DPM    Assistant: Jovany Mcgee RSA    Procedures: cheilectomy first mpj right foot  2.  Injection steroid left heel    Surgical Findings: arthritic joint    Anesthesia: MAC with local    Complications: none    Implants: injections    Specimen: no    Drains: no    Condition: stable    Estimated Blood Loss: less than 3 cc    Preoperative history/indications:  Patient presented to Chelly Neely DPM due to chronic painful bunion right foot with previous bunion surgery.  Pain with motion and discussion of cheilectomy vs implant vs fusion of joint.  All preoperative conservative care have failed to resolve the patient's pain and discomfort.  The patient would like to proceed with surgery involving clean up or cheilectomy.     Informed Consent:  All treatment options have been discussed with the patient including both conservative and surgical attempts at correction. Potential risks and complications of surgical intervention were discussed at length including but not limited to death, loss of limb, post op pain, swelling, infection, bleeding, reoccurrence, extended healing,  and the possibility of further and future surgery.  No guarantees have been made to the patient and the informed consent has been signed.    Procedure in detail:  The patient was brought into the operating room and placed on the operating table in the supine position.  A timeout was called in the presence of the anesthesiologist, circulating nurse, scrub tech, and myself to confirm the correct patient, patient's  date of birth, procedure, and location of the procedure to be performed.  All present were in agreement.  A pneumatic tourniquet was placed about the patient's right ankle.  Following IV sedation, a local infiltrative block was administered about the right foot utilizing approx 15 ccs of a 1:1 mixture of 1% lidocaine plain and 0.5% marcaine plain. The right foot was then scrubbed, prepped, and draped in the usual aseptic manner.  An Esmarch bandage was utilized to exsanguinate the lower extremity and the pneumatic tourniquet was inflated to 250 mmHg where it remained for the duration of the procedure.  Atttention to right foot at first mpj level.  Linear incision approximately 4 cm in length medial to extensor tendon.  Incision was deepened through superficial layers and abundant scar tissue noted.  Care to avoid neurovascular structures.  Capsule was entered an abundant synovial fluid expressed.  Abundant spurring about the proximal phalanx and the head of the first metatarsal was noted.  Articular cartilage of the proximal phalanx fairly intact but the first metatarsal cartilage with marked erosion.  Sagittal saw used to remove osteophytes dorsal, medial and lateral head of first metatarsal.  Adaptive spurs of proximal phalanx removed with rongeur .. Mcglamery elevator used to free the sesamoids plantarly.  Better motion of joint noted.  Site was irrigated and closed in layers, ending with 4.0 nylon for skin.    The incision was dressed with xeroform wrap and covered with sterile compressive dressings consisting of gauze, kerlix, and a mildly compressive ACE wrap.  The pneumatic ankle tourniquet was then deflated and a prompt hyperemic response was noted to all digits of the right foot.  At this time a steroid injection to left heel  Plantar heel prepped and 2cc decadron injected into heel area marked as painful prior to procedure.  Bandaid applied      The patient tolerated the procedure and anesthesia well.  she  was transferred to the recovery room with VSS and vascular status intact.  Following a period of postoperative monitoring, the patient will be discharged home on the following written and oral postop instructions: keep dressings CDI, avoid excessive ambulation, ice and elevate foot when at rest, wear surgical shoe at all times when ambulating, contact my office for all postoperative f/u care and should any problems arise.      Chelly Neely, [facility]  [facility]  [address], [address], [place] [address] [address] [place] [address] [email or web address]  [email or web address]  t: [phone]   f: [phone]          12/20/2024

## 2024-12-23 ENCOUNTER — TELEPHONE (OUTPATIENT)
Facility: CLINIC | Age: 64
End: 2024-12-23

## 2024-12-23 DIAGNOSIS — M79.671 RIGHT FOOT PAIN: Primary | ICD-10-CM

## 2024-12-23 RX ORDER — ZOLPIDEM TARTRATE 5 MG/1
5 TABLET ORAL NIGHTLY PRN
Qty: 30 TABLET | Refills: 3 | Status: SHIPPED | OUTPATIENT
Start: 2024-12-23

## 2024-12-23 NOTE — TELEPHONE ENCOUNTER
XR ordered and scheduled per Ortho protocol.   Sent patient message via [employer] to inform them and ask them to arrive 15-20 minutes prior to appointment with

## 2024-12-24 ENCOUNTER — OFFICE VISIT (OUTPATIENT)
Dept: ORTHOPEDICS CLINIC | Facility: CLINIC | Age: 64
End: 2024-12-24
Payer: COMMERCIAL

## 2024-12-24 ENCOUNTER — HOSPITAL ENCOUNTER (OUTPATIENT)
Dept: GENERAL RADIOLOGY | Age: 64
Discharge: HOME | End: 2024-12-24
Attending: PODIATRIST
Payer: COMMERCIAL

## 2024-12-24 VITALS — BODY MASS INDEX: 21.38 KG/M2 | WEIGHT: 133 LBS | HEIGHT: 66 IN

## 2024-12-24 DIAGNOSIS — M79.671 RIGHT FOOT PAIN: ICD-10-CM

## 2024-12-24 DIAGNOSIS — Z48.89 AFTERCARE FOLLOWING SURGERY: Primary | ICD-10-CM

## 2024-12-24 PROCEDURE — 3008F BODY MASS INDEX DOCD: CPT | Performed by: PODIATRIST

## 2024-12-24 PROCEDURE — 73630 X-RAY EXAM OF FOOT: CPT | Performed by: PODIATRIST

## 2024-12-24 PROCEDURE — 99024 POSTOP FOLLOW-UP VISIT: CPT | Performed by: PODIATRIST

## 2024-12-24 NOTE — PROGRESS NOTES
[facility] Progress Note    Subjective:     Patient is here for postop check.  Friday she had a cheilectomy procedure for the right first MP joint and an injection for the left heel plantar fasciitis.  She is doing pretty well.  Minimal pain        Objective:     Exam left heel nontender  Right foot first MP joint good range of motion is maintained.  Minimal pain.  Sutures intact.  No signs of infection          Imaging: X-rays show absence of the osteo fights about the first MP joint.  Narrowing of the first MP joint noted        Assessment/Plan:     Diagnoses and all orders for this visit:    Aftercare following surgery        We discussed the findings intraoperatively which were pretty significant erosion of the first metatarsal head cartilage.  Because of this I am not as hopeful that she gets long-term relief from this procedure but we will see.  Start range of motion exercises.  She can be without the postop shoe around the house as she walks around to allow more motion.  Range of motion exercises discussed.  Bandage reapplied and follow-up for sutures in about 2 and half weeks, no x-rays            Chelly Neely DPM  [facility]    Venda speech recognition software was used to prepare this note. If a word or phrase is confusing, it is likely do to a failure of recognition. Please contact me with any questions or clarifications.

## 2024-12-26 ENCOUNTER — TELEPHONE (OUTPATIENT)
Dept: ORTHOPEDICS CLINIC | Facility: CLINIC | Age: 64
End: 2024-12-26

## 2024-12-26 NOTE — TELEPHONE ENCOUNTER
Spoke with patient to let her know she should not get the stitches wet because the skin at the incision could pull apart.  Also discussed avoiding submersion for 6 weeks after surgery.  Patient states they are going on vacation in February to the beach.  Advised she speak with Dr Neely at her upcoming appointment to remove stitches.  Patient verbalized understanding.         Future Appointments   Date Time Provider Department Center   1/9/2025 11:15 AM Chelly Neely, JA [facility]

## 2025-01-07 ENCOUNTER — PATIENT MESSAGE (OUTPATIENT)
Dept: INTERNAL MEDICINE CLINIC | Facility: CLINIC | Age: 65
End: 2025-01-07

## 2025-01-07 DIAGNOSIS — M54.2 CHRONIC NECK PAIN: Primary | ICD-10-CM

## 2025-01-07 DIAGNOSIS — G89.29 CHRONIC NECK PAIN: Primary | ICD-10-CM

## 2025-01-08 NOTE — TELEPHONE ENCOUNTER
SV: Pt experiencing intermittent neck pain (chronic) and now they're becoming sharp pains. Would you like to see her in OV or rec she sees a specialist? Please advise, TY!

## 2025-01-09 ENCOUNTER — OFFICE VISIT (OUTPATIENT)
Dept: ORTHOPEDICS CLINIC | Facility: CLINIC | Age: 65
End: 2025-01-09
Payer: COMMERCIAL

## 2025-01-09 VITALS — HEIGHT: 66 IN | WEIGHT: 133 LBS | BODY MASS INDEX: 21.38 KG/M2

## 2025-01-09 DIAGNOSIS — Z48.89 AFTERCARE FOLLOWING SURGERY: Primary | ICD-10-CM

## 2025-01-09 PROCEDURE — 99024 POSTOP FOLLOW-UP VISIT: CPT | Performed by: PODIATRIST

## 2025-01-09 PROCEDURE — 3008F BODY MASS INDEX DOCD: CPT | Performed by: PODIATRIST

## 2025-01-09 NOTE — PROGRESS NOTES
[facility] Progress Note    Subjective:     Mone Is here for a postop check now 3 weeks postop cheilectomy first MP joint right foot.    She is doing well having minimal pain    Objective:     Exam sutures intact no signs of infection very little swelling.  Her range of motion has improved first MP joint          Imaging: Review of x-rays        Assessment/Plan:     Diagnoses and all orders for this visit:    Aftercare following surgery        Sutures removed without incident.  Continue range of motion exercises and now get into a regular tennis shoe.  Gradual increase in active to video but avoid running side-to-side activity tennis until 3 months postop  Follow-up as needed        Chelly Neely, DPMPodiatric Surgery  [facility]  [facility]  [address], [address], [place] [id number] [address] [place], [place] [address] [email or web address]  [email or web address]  t: [phone]   f: [phone]            Dragon speech recognition software was used to prepare this note. If a word or phrase is confusing, it is likely do to a failure of recognition. Please contact me with any questions or clarifications.

## 2025-02-04 ENCOUNTER — TELEPHONE (OUTPATIENT)
Dept: ORTHOPEDICS CLINIC | Facility: CLINIC | Age: 65
End: 2025-02-04

## 2025-02-04 NOTE — TELEPHONE ENCOUNTER
Spoke with patient directly--  She describes foot pain as \"sharp\" and \"intermittent\".  CMS intact. Denies swelling. Cannot replicate pain, says \"comes out of nowhere\".   Advised patient to stay off of foot as much as she can tonight, ice and elevate and take acetaminophen or ibuprofen by following directions on bottle.   Patient verbalized understanding and will call back if pains persist despite rest and conservative measures.

## 2025-04-07 ENCOUNTER — HOSPITAL ENCOUNTER (OUTPATIENT)
Dept: GENERAL RADIOLOGY | Age: 65
Discharge: HOME | End: 2025-04-07
Attending: INTERNAL MEDICINE
Payer: COMMERCIAL

## 2025-04-07 DIAGNOSIS — G89.29 CHRONIC NECK PAIN: ICD-10-CM

## 2025-04-07 DIAGNOSIS — M54.2 CHRONIC NECK PAIN: ICD-10-CM

## 2025-04-07 PROCEDURE — 72040 X-RAY EXAM NECK SPINE 2-3 VW: CPT | Performed by: INTERNAL MEDICINE

## 2025-04-09 ENCOUNTER — TELEPHONE (OUTPATIENT)
Dept: INTERNAL MEDICINE CLINIC | Facility: CLINIC | Age: 65
End: 2025-04-09

## 2025-04-09 NOTE — TELEPHONE ENCOUNTER
Pt is requesting for her recent 'XR Cervical Spine' images and result to be sent over to the following:    [facility]  [facility]  Fax# [phone]  email: [email or web address]    Pt wants c/b when done

## 2025-04-09 NOTE — TELEPHONE ENCOUNTER
Called patient to inform that we can fax results, but not images as that will have to be provided by the radiology department. Pt v/u.     Faxed results to [phone]. Confirmation received.

## 2025-04-15 ENCOUNTER — LAB ENCOUNTER (OUTPATIENT)
Dept: LAB | Age: 65
End: 2025-04-15
Attending: INTERNAL MEDICINE
Payer: COMMERCIAL

## 2025-04-15 DIAGNOSIS — Z00.00 LABORATORY EXAMINATION ORDERED AS PART OF A COMPLETE PHYSICAL EXAMINATION: ICD-10-CM

## 2025-04-15 LAB
ALT SERPL-CCNC: 14 U/L (ref 10–49)
ANION GAP SERPL CALC-SCNC: 8 MMOL/L (ref 0–18)
AST SERPL-CCNC: 27 U/L (ref ?–34)
BASOPHILS # BLD AUTO: 0.04 X10(3) UL (ref 0–0.2)
BASOPHILS NFR BLD AUTO: 0.9 %
BUN BLD-MCNC: 13 MG/DL (ref 9–23)
CALCIUM BLD-MCNC: 9.7 MG/DL (ref 8.7–10.6)
CHLORIDE SERPL-SCNC: 105 MMOL/L (ref 98–112)
CHOLEST SERPL-MCNC: 197 MG/DL (ref ?–200)
CO2 SERPL-SCNC: 27 MMOL/L (ref 21–32)
CREAT BLD-MCNC: 0.82 MG/DL (ref 0.55–1.02)
EGFRCR SERPLBLD CKD-EPI 2021: 81 ML/MIN/1.73M2 (ref 60–?)
EOSINOPHIL # BLD AUTO: 0.07 X10(3) UL (ref 0–0.7)
EOSINOPHIL NFR BLD AUTO: 1.6 %
ERYTHROCYTE [DISTWIDTH] IN BLOOD BY AUTOMATED COUNT: 11.4 %
FASTING PATIENT LIPID ANSWER: YES
FASTING STATUS PATIENT QL REPORTED: YES
GLUCOSE BLD-MCNC: 83 MG/DL (ref 70–99)
HCT VFR BLD AUTO: 41.5 % (ref 35–48)
HDLC SERPL-MCNC: 92 MG/DL (ref 40–59)
HGB BLD-MCNC: 14.3 G/DL (ref 12–16)
IMM GRANULOCYTES # BLD AUTO: 0.01 X10(3) UL (ref 0–1)
IMM GRANULOCYTES NFR BLD: 0.2 %
LDLC SERPL CALC-MCNC: 92 MG/DL (ref ?–100)
LYMPHOCYTES # BLD AUTO: 1.57 X10(3) UL (ref 1–4)
LYMPHOCYTES NFR BLD AUTO: 35 %
MCH RBC QN AUTO: 33.9 PG (ref 26–34)
MCHC RBC AUTO-ENTMCNC: 34.5 G/DL (ref 31–37)
MCV RBC AUTO: 98.3 FL (ref 80–100)
MONOCYTES # BLD AUTO: 0.46 X10(3) UL (ref 0.1–1)
MONOCYTES NFR BLD AUTO: 10.3 %
NEUTROPHILS # BLD AUTO: 2.33 X10 (3) UL (ref 1.5–7.7)
NEUTROPHILS # BLD AUTO: 2.33 X10(3) UL (ref 1.5–7.7)
NEUTROPHILS NFR BLD AUTO: 52 %
NONHDLC SERPL-MCNC: 105 MG/DL (ref ?–130)
OSMOLALITY SERPL CALC.SUM OF ELEC: 289 MOSM/KG (ref 275–295)
PLATELET # BLD AUTO: 261 10(3)UL (ref 150–450)
POTASSIUM SERPL-SCNC: 4.1 MMOL/L (ref 3.5–5.1)
RBC # BLD AUTO: 4.22 X10(6)UL (ref 3.8–5.3)
SODIUM SERPL-SCNC: 140 MMOL/L (ref 136–145)
TRIGL SERPL-MCNC: 75 MG/DL (ref 30–149)
TSI SER-ACNC: 2.5 UIU/ML (ref 0.55–4.78)
VLDLC SERPL CALC-MCNC: 12 MG/DL (ref 0–30)
WBC # BLD AUTO: 4.5 X10(3) UL (ref 4–11)

## 2025-04-15 PROCEDURE — 84443 ASSAY THYROID STIM HORMONE: CPT | Performed by: INTERNAL MEDICINE

## 2025-04-15 PROCEDURE — 84460 ALANINE AMINO (ALT) (SGPT): CPT | Performed by: INTERNAL MEDICINE

## 2025-04-15 PROCEDURE — 80061 LIPID PANEL: CPT | Performed by: INTERNAL MEDICINE

## 2025-04-15 PROCEDURE — 84450 TRANSFERASE (AST) (SGOT): CPT | Performed by: INTERNAL MEDICINE

## 2025-04-15 PROCEDURE — 80048 BASIC METABOLIC PNL TOTAL CA: CPT | Performed by: INTERNAL MEDICINE

## 2025-04-15 PROCEDURE — 85025 COMPLETE CBC W/AUTO DIFF WBC: CPT | Performed by: INTERNAL MEDICINE

## 2025-04-17 ENCOUNTER — OFFICE VISIT (OUTPATIENT)
Dept: INTERNAL MEDICINE CLINIC | Facility: CLINIC | Age: 65
End: 2025-04-17
Payer: COMMERCIAL

## 2025-04-17 VITALS
BODY MASS INDEX: 21.64 KG/M2 | HEIGHT: 66 IN | RESPIRATION RATE: 14 BRPM | WEIGHT: 134.63 LBS | DIASTOLIC BLOOD PRESSURE: 80 MMHG | HEART RATE: 61 BPM | SYSTOLIC BLOOD PRESSURE: 140 MMHG | OXYGEN SATURATION: 98 % | TEMPERATURE: 98 F

## 2025-04-17 DIAGNOSIS — Z00.00 ROUTINE PHYSICAL EXAMINATION: Primary | ICD-10-CM

## 2025-04-17 DIAGNOSIS — I10 ESSENTIAL HYPERTENSION: ICD-10-CM

## 2025-04-17 DIAGNOSIS — F41.9 ANXIETY: ICD-10-CM

## 2025-04-17 DIAGNOSIS — Z78.0 POST-MENOPAUSAL: ICD-10-CM

## 2025-04-17 NOTE — PROGRESS NOTES
Patient Office Visit    ASSESSMENT AND PLAN:   1. Routine physical examination  Note: Continue to exercise at least 150 minutes a week and Eat a plant based diet. Please take 2000 IU of vitamin D daily for life to keep your bones strong. Please see your dentist every 6 months. Continue with your regular eye exam     2. Anxiety  Note: we discussed about a daily medication called SSRI, but she declined. She would like to try to wean off diazepam in the future     3. Essential hypertension  Note: blood pressure elevated today, but patient just had 2 espressos. She will return in 2 week for a blood pressure check. Continue losartan     4. Post-menopausal  Note: continue follow up with the specialist even though we had discussed against pellets       RTC In 2 weeks    Patient/Caregiver Education: Patient/Caregiver Education: There are no barriers to learning. Medical education done. Outcome: Patient verbalizes understanding. Patient is notified to call with any questions, complications, allergies, or worsening or changing symptoms.  Patient is to call with any side effects or complications from the treatments as a result of today.      Reviewed Past Medical History and   Problem List[1]    No orders of the defined types were placed in this encounter.    Requested Prescriptions      No prescriptions requested or ordered in this encounter         Jasmina Heaton DO  CC:  Chief Complaint   Patient presents with    Physical         HPI:   Mone Wolff is a 64 year old female who presents for a physical.    Anxiety: she does feel anxious and has tried to cut down on diazepam. She has been hesitant in starting a daily medication. She is hoping that once her neck and foot pain resolves, she will feel better  Hypertension: compliant with her medication     Past Medical History[2]    Past Surgical History[3]    Social History:  Short Social Hx on File[4]  Family History:  Family  History[5]  Allergies:  Allergies[6]  Current Meds:  Medications Ordered Prior to Encounter[7]      REVIEW OF SYSTEMS   Constitutional: no fatigue normal energy no weight changes   HENT: normal sinuses and no mouth issues   Eyes: . normal vision no eye pain   Respiratory: normal respirations no cough   Cardiovascular: no CP, or palpitations   Gastrointestinal: normal bowels and no abd pains   Genitourinary:  normal urination no hematuria, no frequency   Musculoskeletal: has chronic neck and foot pain  Skin: no rashes or skin lesions that are new   Neurological:  no weakness, no numbness, normal gait   Hematological:  no bruises or bleeding   Psychiatric/Behavioral: anxious    /80 (BP Location: Right arm, Patient Position: Sitting, Cuff Size: adult)   Pulse 61   Temp 98.2 °F (36.8 °C) (Temporal)   Resp 14   Ht 5' 6\" (1.676 m)   Wt 134 lb 9.6 oz (61.1 kg)   LMP 06/22/2016 (Approximate)   SpO2 98%   BMI 21.73 kg/m²     PHYSICAL EXAM:   Constitutional: Vital signs reviewed as noted, well developed, in no acute distress.   HENT: NCAT, bilateral ear canal and tympanic membrane appear normal  Eyes: pupils reactive bilaterally  Neck: No thyroidmegaly  Cardiovascular: nl s1 s2 no m/r/g  Pulmonary/Chest: CTA bilaterally with no wheezes  Abdominal: Soft NT normal Bowel sounds  Extremities: no pedal edema   Neurological:  no weakness in UE and LE, reflexes are normal  Skin: no rashes or bruises on visualized skin, not undressed   Psychiatric:normal mood              [1]   Patient Active Problem List  Diagnosis    Anxiety    Essential hypertension    Antiphospholipid antibody positive    Small fiber neuropathy    History of hypoglycemia    Positive GARRET (antinuclear antibody)    Post-menopausal   [2]   Past Medical History:   Essential hypertension    High blood pressure    PONV (postoperative nausea and vomiting)    Thyroid disease    Visual impairment    GLASSES   [3]   Past Surgical History:  Procedure Laterality  Date          2    Cholecystectomy      Colonoscopy      Hysterectomy  10/2018    Other surgical history      Carpal tunnel right hand     Other surgical history      Bilateral knee surgery     Other surgical history      Oral tooth extraction surgery     Other surgical history Bilateral     Breast implantation   [4]   Social History  Socioeconomic History    Marital status:    Tobacco Use    Smoking status: Never    Smokeless tobacco: Never   Vaping Use    Vaping status: Never Used   Substance and Sexual Activity    Alcohol use: Yes     Alcohol/week: 4.0 standard drinks of alcohol     Types: 4 Glasses of wine per week     Comment: moderate    Drug use: No   Other Topics Concern    Caffeine Concern No    Exercise Yes    Seat Belt Yes    Special Diet No    Stress Concern No    Weight Concern No     Social Drivers of Health     Food Insecurity: No Food Insecurity (2025)    NCSS - Food Insecurity     Worried About Running Out of Food in the Last Year: No     Ran Out of Food in the Last Year: No   Transportation Needs: No Transportation Needs (2025)    NCSS - Transportation     Lack of Transportation: No   Housing Stability: Not At Risk (2025)    NCSS - Housing/Utilities     Has Housing: Yes     Worried About Losing Housing: No     Unable to Get Utilities: No   [5]   Family History  Problem Relation Age of Onset    Hypertension Father     Stroke Father     Other (Parkinson's Dosease) Father     Other (Alcoholism) Father     Other (gout) Father     Other (Ankylosing spondylitis ) Father     Cancer Mother         uterine    Hypertension Mother     Heart Disorder Mother         heart attack    Hypertension Sister     Hypertension Brother     Stroke Paternal Grandmother     Other (Ruptured cerebral aneurysm) Paternal Grandfather    [6]   Allergies  Allergen Reactions    Dust Mites Runny nose and Tightness in Throat    Adhesive Tape RASH   [7]   Current Outpatient Medications on File Prior to  Visit   Medication Sig Dispense Refill    zolpidem 5 MG Oral Tab Take 1 tablet (5 mg total) by mouth nightly as needed for Sleep. 30 tablet 3    losartan 25 MG Oral Tab Take 1 tablet (25 mg total) by mouth every morning. 90 tablet 1    diazePAM 5 MG Oral Tab Take 1 tablet twice a day by oral route as needed for 30 days, for anxiety.       Current Facility-Administered Medications on File Prior to Visit   Medication Dose Route Frequency Provider Last Rate Last Admin    cyanocobalamin (Vitamin B12) 1000 MCG/ML injection 1,000 mcg  1,000 mcg Intramuscular Q30 Days    1,000 mcg at 11/26/24 0925

## 2025-04-17 NOTE — PATIENT INSTRUCTIONS
Continue to exercise at least 150 minutes a week and Eat a plant based diet     Please take 2000 IU of vitamin D daily for life to keep your bones strong    Please see your dentist every 6 months    Continue with your regular eye exam    We could consider a daily anxiety medication    See me back in 2 week for a blood pressure check

## 2025-04-18 ENCOUNTER — OFFICE VISIT (OUTPATIENT)
Dept: OPHTHALMOLOGY | Age: 65
End: 2025-04-18

## 2025-04-18 DIAGNOSIS — H25.013 CATARACT CORTICAL, SENILE, BILATERAL: Primary | ICD-10-CM

## 2025-04-18 PROBLEM — R76.0 ANTIPHOSPHOLIPID ANTIBODY POSITIVE: Status: ACTIVE | Noted: 2017-08-10

## 2025-04-18 PROBLEM — E07.9 THYROID DISORDER: Status: ACTIVE | Noted: 2021-09-16

## 2025-04-18 PROBLEM — R76.8 POSITIVE ANA (ANTINUCLEAR ANTIBODY): Status: ACTIVE | Noted: 2024-07-29

## 2025-04-18 PROCEDURE — 92004 COMPRE OPH EXAM NEW PT 1/>: CPT | Performed by: OPHTHALMOLOGY

## 2025-04-18 RX ORDER — ESTRADIOL 0.5 MG/.5G
GEL TOPICAL
COMMUNITY

## 2025-04-18 RX ORDER — MELOXICAM 15 MG/1
1 TABLET ORAL DAILY
COMMUNITY
End: 2025-04-18

## 2025-04-18 RX ORDER — MELOXICAM 7.5 MG/1
1 TABLET ORAL DAILY
COMMUNITY

## 2025-04-18 RX ORDER — LOSARTAN POTASSIUM 25 MG/1
25 TABLET ORAL DAILY
COMMUNITY

## 2025-04-18 RX ORDER — DIAZEPAM 5 MG/1
5 TABLET ORAL 2 TIMES DAILY PRN
COMMUNITY
Start: 2025-01-10

## 2025-04-18 RX ORDER — HEPATITIS A VACCINE 1440 [IU]/ML
1440 INJECTION, SUSPENSION INTRAMUSCULAR ONCE
COMMUNITY

## 2025-04-18 RX ORDER — MONTELUKAST SODIUM 10 MG/1
TABLET ORAL
COMMUNITY
End: 2025-04-18

## 2025-04-18 RX ORDER — LEVOTHYROXINE SODIUM 50 UG/1
1 TABLET ORAL EVERY MORNING
COMMUNITY
End: 2025-04-18

## 2025-04-18 RX ORDER — ALBUTEROL SULFATE 90 UG/1
INHALANT RESPIRATORY (INHALATION)
COMMUNITY
End: 2025-04-18

## 2025-04-18 RX ORDER — CYANOCOBALAMIN 1000 UG/ML
1000 INJECTION, SOLUTION INTRAMUSCULAR; SUBCUTANEOUS
COMMUNITY
Start: 2024-11-19

## 2025-04-18 ASSESSMENT — EXTERNAL EXAM - LEFT EYE: OS_EXAM: NORMAL

## 2025-04-18 ASSESSMENT — REFRACTION_WEARINGRX
OD_SPHERE: +0.75
OS_ADD: +2.50
OS_SPHERE: +2.25
OD_ADD: +2.50
OD_CYLINDER: +0.75
OS_AXIS: 051
SPECS_TYPE: PROGRESSIVE
OD_AXIS: 010
OS_CYLINDER: +0.50

## 2025-04-18 ASSESSMENT — VISUAL ACUITY
OS_CC: 20/25
CORRECTION_TYPE: GLASSES
OS_CC: JAEGER 1
OD_BAT_HIGH: 20/50
METHOD: SNELLEN - LINEAR
OS_BAT_HIGH: 20/50
OD_CC: 20/20
OD_CC: JAEGER 1

## 2025-04-18 ASSESSMENT — TONOMETRY
OD_IOP_MMHG: 11
OS_IOP_MMHG: 11
IOP_METHOD: APPLANATION

## 2025-04-18 ASSESSMENT — SLIT LAMP EXAM - LIDS
COMMENTS: NORMAL
COMMENTS: NORMAL

## 2025-04-18 ASSESSMENT — CONF VISUAL FIELD
OS_INFERIOR_NASAL_RESTRICTION: 0
OD_INFERIOR_NASAL_RESTRICTION: 0
OD_INFERIOR_TEMPORAL_RESTRICTION: 0
OS_INFERIOR_TEMPORAL_RESTRICTION: 0
OD_SUPERIOR_NASAL_RESTRICTION: 0
OD_NORMAL: 1
OS_SUPERIOR_TEMPORAL_RESTRICTION: 0
OD_SUPERIOR_TEMPORAL_RESTRICTION: 0
OS_NORMAL: 1
OS_SUPERIOR_NASAL_RESTRICTION: 0

## 2025-04-18 ASSESSMENT — REFRACTION_MANIFEST
OD_CYLINDER: +0.50
OS_SPHERE: +2.75
OD_SPHERE: +1.25
OS_AXIS: 038
METHOD_AUTOREFRACTION: 1
OS_CYLINDER: +0.75
OD_AXIS: 105

## 2025-04-18 ASSESSMENT — EXTERNAL EXAM - RIGHT EYE: OD_EXAM: NORMAL

## 2025-04-18 ASSESSMENT — CUP TO DISC RATIO
OD_RATIO: 0.1
OS_RATIO: 0.1

## 2025-04-24 ENCOUNTER — PATIENT MESSAGE (OUTPATIENT)
Dept: ORTHOPEDICS CLINIC | Facility: CLINIC | Age: 65
End: 2025-04-24

## 2025-04-24 DIAGNOSIS — M20.21 HALLUX RIGIDUS OF RIGHT FOOT: ICD-10-CM

## 2025-04-24 DIAGNOSIS — M72.2 PLANTAR FASCIITIS: Primary | ICD-10-CM

## 2025-05-01 ENCOUNTER — OFFICE VISIT (OUTPATIENT)
Dept: INTERNAL MEDICINE CLINIC | Facility: CLINIC | Age: 65
End: 2025-05-01
Payer: COMMERCIAL

## 2025-05-01 VITALS
HEART RATE: 82 BPM | HEIGHT: 66 IN | BODY MASS INDEX: 21.47 KG/M2 | TEMPERATURE: 99 F | OXYGEN SATURATION: 97 % | RESPIRATION RATE: 14 BRPM | WEIGHT: 133.63 LBS | SYSTOLIC BLOOD PRESSURE: 124 MMHG | DIASTOLIC BLOOD PRESSURE: 82 MMHG

## 2025-05-01 DIAGNOSIS — I10 ESSENTIAL HYPERTENSION: Primary | ICD-10-CM

## 2025-05-01 PROCEDURE — 3079F DIAST BP 80-89 MM HG: CPT | Performed by: INTERNAL MEDICINE

## 2025-05-01 PROCEDURE — 3008F BODY MASS INDEX DOCD: CPT | Performed by: INTERNAL MEDICINE

## 2025-05-01 PROCEDURE — 3074F SYST BP LT 130 MM HG: CPT | Performed by: INTERNAL MEDICINE

## 2025-05-01 PROCEDURE — G2211 COMPLEX E/M VISIT ADD ON: HCPCS | Performed by: INTERNAL MEDICINE

## 2025-05-01 PROCEDURE — 99213 OFFICE O/P EST LOW 20 MIN: CPT | Performed by: INTERNAL MEDICINE

## 2025-05-01 NOTE — PROGRESS NOTES
Patient Office Visit    ASSESSMENT AND PLAN:   1. Essential hypertension  Note: blood pressure is in the normal range. Continue current losartan    RTC in 6 month       Patient/Caregiver Education: Patient/Caregiver Education: There are no barriers to learning. Medical education done. Outcome: Patient verbalizes understanding. Patient is notified to call with any questions, complications, allergies, or worsening or changing symptoms.  Patient is to call with any side effects or complications from the treatments as a result of today.      Reviewed Past Medical History and   Problem List[1]    No orders of the defined types were placed in this encounter.    Requested Prescriptions      No prescriptions requested or ordered in this encounter         Jasmina Heaton DO  CC:  Chief Complaint   Patient presents with    Follow - Up         HPI:   Mone Wolff is a 64 year old female who presents for a follow up. She did not have another espresso today    Past Medical History[2]    Past Surgical History[3]    Social History:  Short Social Hx on File[4]  Family History:  Family History[5]  Allergies:  Allergies[6]  Current Meds:  Medications Ordered Prior to Encounter[7]      REVIEW OF SYSTEMS   Constitutional: no fatigue normal energy no weight changes   HENT: normal sinuses and no mouth issues   Eyes: . normal vision no eye pain   Respiratory: normal respirations no cough   Cardiovascular: no CP, or palpitations   Gastrointestinal: normal bowels and no abd pains   Genitourinary:  normal urination no hematuria, no frequency   Musculoskeletal: no pains in arms/legs, normal range of motion   Skin: no rashes or skin lesions that are new   Neurological:  no weakness, no numbness, normal gait   Hematological:  no bruises or bleeding   Psychiatric/Behavioral: normal mood no anxiety normal behavior     /82 (BP Location: Right arm, Patient Position: Sitting, Cuff Size: adult)   Pulse 82   Temp 98.7 °F (37.1 °C)  (Temporal)   Resp 14   Ht 5' 6\" (1.676 m)   Wt 133 lb 9.6 oz (60.6 kg)   LMP 2016 (Approximate)   SpO2 97%   BMI 21.56 kg/m²     PHYSICAL EXAM:   Constitutional: Vital signs reviewed as noted, well developed, in no acute distress.   HENT: NCAT  Eyes: pupils reactive bilaterally  Cardiovascular: nl s1 s2 no m/r/g  Pulmonary/Chest: CTA bilaterally with no wheezes  Extremities: no pedal edema   Neurological:  no weakness in UE and LE, reflexes are normal  Skin: no rashes or bruises on visualized skin, not undressed   Psychiatric:normal mood              [1]   Patient Active Problem List  Diagnosis    Anxiety    Essential hypertension    Antiphospholipid antibody positive    Small fiber neuropathy    History of hypoglycemia    Positive GARRET (antinuclear antibody)    Post-menopausal   [2]   Past Medical History:   Essential hypertension    High blood pressure    PONV (postoperative nausea and vomiting)    Thyroid disease    Visual impairment    GLASSES   [3]   Past Surgical History:  Procedure Laterality Date          2    Cholecystectomy      Colonoscopy      Hysterectomy  10/2018    Other surgical history      Carpal tunnel right hand     Other surgical history      Bilateral knee surgery     Other surgical history      Oral tooth extraction surgery     Other surgical history Bilateral     Breast implantation   [4]   Social History  Socioeconomic History    Marital status:    Tobacco Use    Smoking status: Never    Smokeless tobacco: Never   Vaping Use    Vaping status: Never Used   Substance and Sexual Activity    Alcohol use: Yes     Alcohol/week: 4.0 standard drinks of alcohol     Types: 4 Glasses of wine per week     Comment: moderate    Drug use: No   Other Topics Concern    Caffeine Concern No    Exercise Yes    Seat Belt Yes    Special Diet No    Stress Concern No    Weight Concern No     Social Drivers of Health     Food Insecurity: No Food Insecurity (2025)    NCSS - Food  Insecurity     Worried About Running Out of Food in the Last Year: No     Ran Out of Food in the Last Year: No   Transportation Needs: No Transportation Needs (4/17/2025)    NCSS - Transportation     Lack of Transportation: No   Housing Stability: Not At Risk (4/17/2025)    NCSS - Housing/Utilities     Has Housing: Yes     Worried About Losing Housing: No     Unable to Get Utilities: No   [5]   Family History  Problem Relation Age of Onset    Hypertension Father     Stroke Father     Other (Parkinson's Dosease) Father     Other (Alcoholism) Father     Other (gout) Father     Other (Ankylosing spondylitis ) Father     Cancer Mother         uterine    Hypertension Mother     Heart Disorder Mother         heart attack    Hypertension Sister     Hypertension Brother     Stroke Paternal Grandmother     Other (Ruptured cerebral aneurysm) Paternal Grandfather    [6]   Allergies  Allergen Reactions    Dust Mites Runny nose and Tightness in Throat    Adhesive Tape RASH   [7]   Current Outpatient Medications on File Prior to Visit   Medication Sig Dispense Refill    zolpidem 5 MG Oral Tab Take 1 tablet (5 mg total) by mouth nightly as needed for Sleep. 30 tablet 3    losartan 25 MG Oral Tab Take 1 tablet (25 mg total) by mouth every morning. 90 tablet 1    diazePAM 5 MG Oral Tab Take 1 tablet twice a day by oral route as needed for 30 days, for anxiety.       Current Facility-Administered Medications on File Prior to Visit   Medication Dose Route Frequency Provider Last Rate Last Admin    cyanocobalamin (Vitamin B12) 1000 MCG/ML injection 1,000 mcg  1,000 mcg Intramuscular Q30 Days    1,000 mcg at 11/26/24 09

## 2025-05-12 NOTE — TELEPHONE ENCOUNTER
Can we do an authorization for the Modafinil?  Best,  Jan Juraez, Med Ass't     From: Sheila Eldridge  To: Gerard Garcia MD  Sent: 5/26/2017 1:24 PM CDT  Subject: Non-Urgent Medical Question    Hello again: My  and i are going to [place] 6/17. I heard on the radio that it's a good idea to check on vaccines.  Should we get booster

## 2025-05-19 ENCOUNTER — HOSPITAL ENCOUNTER (OUTPATIENT)
Age: 65
Discharge: HOME | End: 2025-05-19
Payer: COMMERCIAL

## 2025-05-19 VITALS
BODY MASS INDEX: 20.57 KG/M2 | DIASTOLIC BLOOD PRESSURE: 84 MMHG | WEIGHT: 128 LBS | RESPIRATION RATE: 18 BRPM | TEMPERATURE: 97 F | HEART RATE: 73 BPM | HEIGHT: 66 IN | SYSTOLIC BLOOD PRESSURE: 139 MMHG | OXYGEN SATURATION: 100 %

## 2025-05-19 DIAGNOSIS — T14.8XXA PUNCTURE WOUND: Primary | ICD-10-CM

## 2025-05-19 PROCEDURE — 99213 OFFICE O/P EST LOW 20 MIN: CPT

## 2025-05-19 RX ORDER — MUPIROCIN 20 MG/G
1 OINTMENT TOPICAL 3 TIMES DAILY
Qty: 1 EACH | Refills: 0 | Status: SHIPPED | OUTPATIENT
Start: 2025-05-19 | End: 2025-06-02

## 2025-05-19 NOTE — ED INITIAL ASSESSMENT (HPI)
C/o punctured wound bottom of left foot on May 8-stepped to a metal strip of carpeting. Sow healing and painful. Tdap 2018

## 2025-05-19 NOTE — ED PROVIDER NOTES
Patient Seen in: [facility]    History     Chief Complaint   Patient presents with    Wound Care     Punctured bottom of foot about May 8 and slow healing and painful - Entered by patient     Stated Complaint: Wound Care - Punctured bottom of left foot about May 8 and slow healing and nereida*    HPI    HPI: Mone Wolff is a 64 year old female who presents after an injury to the left plantar foot that occurred 10 days ago.  Pt stepped on a piece of metal in the middle of the night in her room. She reports she cleaned out the area.  Has been using a cam boot so she isn't putting pressure on the area.  She did more yesterday than she has, and it became swollen and more painful.  She was worried that there might be an infection or a fb in the foot.  Reports no redness, warmth, or drainage      Past Medical History[1]    Past Surgical History[2]         Family History[3]    Short Social Hx on File[4]    Review of Systems    Positive for stated complaint: Wound Care - Punctured bottom of left foot about May 8 and slow healing and nereida*  Other systems are as noted in HPI.  Constitutional and vital signs reviewed.      All other systems reviewed and negative except as noted above.    PSFH elements reviewed from today and agreed except as otherwise stated in HPI.    Physical Exam     ED Triage Vitals [05/19/25 1016]   /84   Pulse 73   Resp 18   Temp 97.4 °F (36.3 °C)   Temp src    SpO2 100 %   O2 Device None (Room air)       Current:/84   Pulse 73   Temp 97.4 °F (36.3 °C)   Resp 18   Ht 167.6 cm (5' 6\")   Wt 58.1 kg   LMP 06/22/2016 (Approximate)   SpO2 100%   BMI 20.66 kg/m²         Physical Exam      MENTAL STATUS: Alert, oriented, and cooperative. No focal deficit  HEAD: Atraumatic  NECK: Supple, full range of motion without pain or paresthesias  EXTREMITIES: The left  foot is tender over plantar base of the 3rd and 4th metatarsals.  There is no erythema, no drainge, no signs of  infection.  There is a skin flap that is healing well.  With no signs of a fb.   NEURO:Sensation to touch is intact.  SKIN: No open wounds, no rashes.  PSYCH: Normal affect. Calm and cooperative.    ED Course   Labs Reviewed - No data to display  I have personally  reviewed available prior medical records for any recent pertinent discharge summaries/testing. Patient/family updated on results and plan, a verbalized understanding and agreement with the plan.  I explained to the patient that emergent conditions may arise and to go to the ER for new, worsening or any persistent conditions. I've explained the importance of taking all medicatons as prescribed, follow up, and return precuations,  All questions answered.    Please note that this report has been produced using speech recognition software and may contain errors related to that system including, but not limited to, errors in grammar, punctuation, and spelling, as well as words and phrases that possibly may have been recognized inappropriately.  If there are any questions or concerns, contact the dictating provider for clarification.  MDM   Radiology:  No results found.      Patient is well-appearing, well-hydrated, well-nourished, nontoxic, there is no distress noted.  Patient has what appears to be small puncture wound on the bottom of the foot at the base of the 3rd and 4th metatarsals.  Patient has no erythema, no drainage, no obvious signs of infection or foreign bodies noted.  The wound is well-healing.  I discussed not wearing a cam walker, she should wear a postop shoe.  Patient was offered a postop shoe however she did say that she had 1 at home.  Patient was discharged home in stable condition after wound care was completed, she was sent home with Bactroban.  Patient was discharged in stable condition to follow-up with her primary care physician and to return to the emergency department with any worsening symptoms or concern    Disposition and Plan      Clinical Impression:  1. Puncture wound        Disposition:  Discharge    Follow-up:  Jasmina Heaton,   [address]  [address]  [place] [address]  [phone]            Medications Prescribed:  Current Discharge Medication List        START taking these medications    Details   mupirocin 2 % External Ointment Apply 1 Application topically 3 (three) times daily for 14 days.  Qty: 1 each, Refills: 0                            [1]   Past Medical History:   Essential hypertension    High blood pressure    PONV (postoperative nausea and vomiting)    Thyroid disease    Visual impairment    GLASSES   [2]   Past Surgical History:  Procedure Laterality Date          2    Cholecystectomy      Colonoscopy      Hysterectomy  10/2018    Other surgical history      Carpal tunnel right hand     Other surgical history      Bilateral knee surgery     Other surgical history      Oral tooth extraction surgery     Other surgical history Bilateral     Breast implantation   [3]   Family History  Problem Relation Age of Onset    Hypertension Father     Stroke Father     Other (Parkinson's Dosease) Father     Other (Alcoholism) Father     Other (gout) Father     Other (Ankylosing spondylitis ) Father     Cancer Mother         uterine    Hypertension Mother     Heart Disorder Mother         heart attack    Hypertension Sister     Hypertension Brother     Stroke Paternal Grandmother     Other (Ruptured cerebral aneurysm) Paternal Grandfather    [4]   Social History  Socioeconomic History    Marital status:    Tobacco Use    Smoking status: Never    Smokeless tobacco: Never   Vaping Use    Vaping status: Never Used   Substance and Sexual Activity    Alcohol use: Yes     Alcohol/week: 4.0 standard drinks of alcohol     Types: 4 Glasses of wine per week     Comment: moderate    Drug use: No   Other Topics Concern    Caffeine Concern No    Exercise Yes    Seat Belt Yes    Special Diet No    Stress Concern No     Weight Concern No     Social Drivers of Health     Food Insecurity: No Food Insecurity (4/17/2025)    NCSS - Food Insecurity     Worried About Running Out of Food in the Last Year: No     Ran Out of Food in the Last Year: No   Transportation Needs: No Transportation Needs (4/17/2025)    NCSS - Transportation     Lack of Transportation: No   Housing Stability: Not At Risk (4/17/2025)    NCSS - Housing/Utilities     Has Housing: Yes     Worried About Losing Housing: No     Unable to Get Utilities: No

## 2025-05-19 NOTE — DISCHARGE INSTRUCTIONS
Keep the area clean and dry    Apply antibiotic ointment 2-3 times daily    Use your postop shoe as opposed to your cam walker    Follow-up with your primary care physician in 24 to 48 hours    Return to the emergency department for any worsening symptoms or concern

## 2025-06-03 ENCOUNTER — TELEPHONE (OUTPATIENT)
Facility: CLINIC | Age: 65
End: 2025-06-03

## 2025-06-03 NOTE — TELEPHONE ENCOUNTER
Established pt scheduled online to see Dr. Neely for a wound on the bottom of her foot that is not healing.   Please advise if imaging is needed.  Future Appointments   Date Time Provider Department Center   6/11/2025  3:20 PM Chelly Neely, JA [facility]

## 2025-06-03 NOTE — TELEPHONE ENCOUNTER
DOI: 5/8/25 puncture wound to left foot  Seen in urgent care 5/19/25  Notes from urgent care: Patient has what appears to be small puncture wound on the bottom of the foot at the base of the 3rd and 4th metatarsals. Patient has no erythema, no drainage, no obvious signs of infection or foreign bodies noted. The wound is well-healing. I discussed not wearing a cam walker, she should wear a postop shoe. Patient was offered a postop shoe however she did say that she had 1 at home. Patient was discharged home in stable condition after wound care was completed, she was sent home with Bactroban.

## 2025-06-03 NOTE — TELEPHONE ENCOUNTER
No further imaging needed per Chelly Smith, DPM to [facility]      6/3/25  2:49 PM  Will eval first thanks

## 2025-06-06 ENCOUNTER — PATIENT MESSAGE (OUTPATIENT)
Dept: ORTHOPEDICS CLINIC | Facility: CLINIC | Age: 65
End: 2025-06-06

## 2025-06-07 ENCOUNTER — PATIENT MESSAGE (OUTPATIENT)
Dept: INTERNAL MEDICINE CLINIC | Facility: CLINIC | Age: 65
End: 2025-06-07

## 2025-06-09 NOTE — TELEPHONE ENCOUNTER
SV: Please see [facility] patient requesting diazapam for anxiety. Please advise. Ty       LOV:05/01/25    Psychiatric/Behavioral: normal mood no anxiety normal behavior

## 2025-06-11 ENCOUNTER — OFFICE VISIT (OUTPATIENT)
Dept: ORTHOPEDICS CLINIC | Facility: CLINIC | Age: 65
End: 2025-06-11
Payer: COMMERCIAL

## 2025-06-11 VITALS — WEIGHT: 129 LBS | BODY MASS INDEX: 20.73 KG/M2 | HEIGHT: 66 IN

## 2025-06-11 DIAGNOSIS — M72.2 PLANTAR FASCIITIS: ICD-10-CM

## 2025-06-11 DIAGNOSIS — M79.672 PAIN OF LEFT HEEL: Primary | ICD-10-CM

## 2025-06-11 DIAGNOSIS — T14.8XXA PUNCTURE WOUND: ICD-10-CM

## 2025-06-11 PROCEDURE — 3008F BODY MASS INDEX DOCD: CPT | Performed by: PODIATRIST

## 2025-06-11 PROCEDURE — 99214 OFFICE O/P EST MOD 30 MIN: CPT | Performed by: PODIATRIST

## 2025-06-11 NOTE — PROGRESS NOTES
[facility] Progress Note    Subjective:     Mone is here today recent puncture wound to the bottom of her left foot  Stepped on the metal part holding down carpet in her home.  She had a lot of discomfort and some bleeding.  Weeks later now she still having little discomfort but is overall getting better.      She is also persistent with left heel pain plantar fascia, plantar fasciitis she has had steroid injections, orthotics better shoes physical therapy all without relief.  It bothers her enough that she is hoping to go ahead with surgery at this point.  She had an ultrasound study with Dr. Atwood last year and the plantar fascia was thick and no tear  Objective:     Exam plantar left foot subthird MP joint region there is a HPK with residual dried blood and with debridement no apparent foreign body.  No swelling no warmth no drainage.  Stable resolving puncture wound    Left heel  Pt has pain at plantar fascial insertion area of heel.  No pain with side to side compression of calcaneus.  No swelling.  Negative tinel's sign at medial ankle.          Imaging: No plain x-rays of the left foot today  Review of ultrasound study left heel.  No plantar fascial tear but plantar fascia is 4.2 mm      Assessment/Plan:     Diagnoses and all orders for this visit:    Pain of left heel    Plantar fasciitis    Puncture wound        Pt has evolving puncture wound of the left foot and HPK was debrided.  No foreign body seen I think she will do just fine gradual increase in activity as far as that    As far as the persistent plantar fascitis she does have a thick plantar fascia 4.2 mm and she may benefit from plantar fascial release.  She has failed all conservative options and surgery is the next step if she is considering going ahead with it.  Will be sending her on to Dr. Bellamy as I do not do this kind of surgery anymore.  I did touch base with him          Chelly Neely, DPMPodiatric Surgery  FACFAS  [facility]  [facility]  [address], [address], [place] [address]   [address] [place], [place] [address] [place] [email or web address].org  [email or web address]  t: [phone]   f: [phone]            Dragon speech recognition software was used to prepare this note. If a word or phrase is confusing, it is likely do to a failure of recognition. Please contact me with any questions or clarifications.

## 2025-07-09 DIAGNOSIS — F51.01 PRIMARY INSOMNIA: ICD-10-CM

## 2025-07-09 RX ORDER — ZOLPIDEM TARTRATE 5 MG/1
5 TABLET ORAL NIGHTLY PRN
Qty: 30 TABLET | Refills: 3 | Status: SHIPPED | OUTPATIENT
Start: 2025-07-09

## 2025-07-09 NOTE — TELEPHONE ENCOUNTER
zolpidem 5 MG Oral Tab         Sig: Take 1 tablet (5 mg total) by mouth nightly as needed for Sleep.    Disp: 30 tablet    Refills: 3    Start: 7/9/2025    Class: Normal    Non-formulary For: Primary insomnia    Last ordered: 6 months ago (12/23/2024) by Jasmina Heaton DO    Controlled Substance Medication Emysew4107/09/2025 07:31 AM    This medication is a controlled substance - forward to provider to refill    Medication is active on med list      To be filled at: [id number] - [place], [place] - [address] AT [facility] [address], [phone], [phone]        LOV:  5/1/25  RTC:  6 months  Recent Labs: 4/15/25    Upcoming OV 7/31/25

## 2025-07-31 ENCOUNTER — PATIENT MESSAGE (OUTPATIENT)
Dept: INTERNAL MEDICINE CLINIC | Facility: CLINIC | Age: 65
End: 2025-07-31

## 2025-07-31 DIAGNOSIS — F51.01 PRIMARY INSOMNIA: ICD-10-CM

## 2025-08-01 ENCOUNTER — OFFICE VISIT (OUTPATIENT)
Facility: CLINIC | Age: 65
End: 2025-08-01

## 2025-08-01 VITALS
DIASTOLIC BLOOD PRESSURE: 64 MMHG | HEART RATE: 68 BPM | WEIGHT: 130 LBS | SYSTOLIC BLOOD PRESSURE: 130 MMHG | BODY MASS INDEX: 20.89 KG/M2 | HEIGHT: 66 IN | OXYGEN SATURATION: 97 %

## 2025-08-01 DIAGNOSIS — R00.2 PALPITATIONS: Primary | ICD-10-CM

## 2025-08-01 PROCEDURE — 3075F SYST BP GE 130 - 139MM HG: CPT | Performed by: STUDENT IN AN ORGANIZED HEALTH CARE EDUCATION/TRAINING PROGRAM

## 2025-08-01 PROCEDURE — 3008F BODY MASS INDEX DOCD: CPT | Performed by: STUDENT IN AN ORGANIZED HEALTH CARE EDUCATION/TRAINING PROGRAM

## 2025-08-01 PROCEDURE — 99204 OFFICE O/P NEW MOD 45 MIN: CPT | Performed by: STUDENT IN AN ORGANIZED HEALTH CARE EDUCATION/TRAINING PROGRAM

## 2025-08-01 PROCEDURE — 3078F DIAST BP <80 MM HG: CPT | Performed by: STUDENT IN AN ORGANIZED HEALTH CARE EDUCATION/TRAINING PROGRAM

## 2025-08-01 RX ORDER — ZOLPIDEM TARTRATE 5 MG/1
5 TABLET ORAL NIGHTLY PRN
Qty: 30 TABLET | Refills: 3 | Status: SHIPPED | OUTPATIENT
Start: 2025-08-01

## 2025-08-01 RX ORDER — GLUCOSAMINE HCL/CHONDROITIN SU 500-400 MG
CAPSULE ORAL
Qty: 30 STRIP | Refills: 0 | Status: SHIPPED | OUTPATIENT
Start: 2025-08-01

## 2025-08-01 RX ORDER — AVOBENZONE, HOMOSALATE, OCTISALATE, OCTOCRYLENE 30; 40; 45; 26 MG/ML; MG/ML; MG/ML; MG/ML
CREAM TOPICAL
Qty: 30 EACH | Refills: 0 | Status: SHIPPED | OUTPATIENT
Start: 2025-08-01

## 2025-08-06 DIAGNOSIS — I10 ESSENTIAL HYPERTENSION: ICD-10-CM

## 2025-08-06 RX ORDER — LOSARTAN POTASSIUM 25 MG/1
25 TABLET ORAL EVERY MORNING
Qty: 90 TABLET | Refills: 1 | Status: SHIPPED | OUTPATIENT
Start: 2025-08-06

## 2025-08-07 ENCOUNTER — OFFICE VISIT (OUTPATIENT)
Dept: UROLOGY | Facility: HOSPITAL | Age: 65
End: 2025-08-07
Attending: OBSTETRICS & GYNECOLOGY

## 2025-08-07 VITALS — WEIGHT: 128 LBS | HEIGHT: 66 IN | BODY MASS INDEX: 20.57 KG/M2 | RESPIRATION RATE: 16 BRPM

## 2025-08-07 DIAGNOSIS — N39.41 URGE INCONTINENCE: ICD-10-CM

## 2025-08-07 DIAGNOSIS — N39.3 FEMALE STRESS INCONTINENCE: ICD-10-CM

## 2025-08-07 DIAGNOSIS — R35.1 NOCTURIA: Primary | ICD-10-CM

## 2025-08-07 DIAGNOSIS — N39.0 ACUTE UTI: ICD-10-CM

## 2025-08-07 DIAGNOSIS — N95.2 POSTMENOPAUSAL ATROPHIC VAGINITIS: ICD-10-CM

## 2025-08-07 DIAGNOSIS — N81.84 PELVIC MUSCLE WASTING: ICD-10-CM

## 2025-08-07 LAB
BLOOD URINE: NEGATIVE
CONTROL RUN WITHIN 24 HOURS?: YES
LEUKOCYTE ESTERASE URINE: NEGATIVE
NITRITE URINE: POSITIVE

## 2025-08-07 PROCEDURE — 81002 URINALYSIS NONAUTO W/O SCOPE: CPT | Performed by: OBSTETRICS & GYNECOLOGY

## 2025-08-07 PROCEDURE — 87086 URINE CULTURE/COLONY COUNT: CPT | Performed by: OBSTETRICS & GYNECOLOGY

## 2025-08-07 PROCEDURE — 99202 OFFICE O/P NEW SF 15 MIN: CPT

## 2025-08-07 PROCEDURE — 87186 SC STD MICRODIL/AGAR DIL: CPT | Performed by: OBSTETRICS & GYNECOLOGY

## 2025-08-07 PROCEDURE — 87077 CULTURE AEROBIC IDENTIFY: CPT | Performed by: OBSTETRICS & GYNECOLOGY

## 2025-08-07 RX ORDER — ESTRADIOL 0.1 MG/G
CREAM VAGINAL
Qty: 42 G | Refills: 3 | Status: SHIPPED | OUTPATIENT
Start: 2025-08-07

## 2025-08-07 RX ORDER — NITROFURANTOIN 25; 75 MG/1; MG/1
100 CAPSULE ORAL 2 TIMES DAILY
Qty: 14 CAPSULE | Refills: 0 | Status: SHIPPED | OUTPATIENT
Start: 2025-08-07

## 2025-08-11 ENCOUNTER — TELEPHONE (OUTPATIENT)
Dept: UROLOGY | Facility: HOSPITAL | Age: 65
End: 2025-08-11

## 2025-08-12 ENCOUNTER — LAB ENCOUNTER (OUTPATIENT)
Dept: LAB | Age: 65
End: 2025-08-12
Attending: STUDENT IN AN ORGANIZED HEALTH CARE EDUCATION/TRAINING PROGRAM

## 2025-08-12 DIAGNOSIS — R00.2 PALPITATIONS: ICD-10-CM

## 2025-08-12 LAB
ALBUMIN SERPL-MCNC: 4.4 G/DL (ref 3.2–4.8)
CORTIS SERPL-MCNC: 17.7 UG/DL
T3 SERPL-MCNC: 0.93 NG/ML (ref 0.6–1.81)
T4 FREE SERPL-MCNC: 1.1 NG/DL (ref 0.8–1.7)
TSI SER-ACNC: 2.24 UIU/ML (ref 0.55–4.78)

## 2025-08-12 PROCEDURE — 84480 ASSAY TRIIODOTHYRONINE (T3): CPT

## 2025-08-12 PROCEDURE — 84439 ASSAY OF FREE THYROXINE: CPT

## 2025-08-12 PROCEDURE — 82040 ASSAY OF SERUM ALBUMIN: CPT

## 2025-08-12 PROCEDURE — 84443 ASSAY THYROID STIM HORMONE: CPT

## 2025-08-12 PROCEDURE — 82533 TOTAL CORTISOL: CPT

## 2025-08-12 PROCEDURE — 36415 COLL VENOUS BLD VENIPUNCTURE: CPT

## 2025-08-19 ENCOUNTER — TELEMEDICINE (OUTPATIENT)
Facility: CLINIC | Age: 65
End: 2025-08-19

## 2025-08-19 DIAGNOSIS — R00.2 PALPITATIONS: Primary | ICD-10-CM

## 2025-08-19 PROCEDURE — 98006 SYNCH AUDIO-VIDEO EST MOD 30: CPT | Performed by: STUDENT IN AN ORGANIZED HEALTH CARE EDUCATION/TRAINING PROGRAM

## 2025-08-20 ENCOUNTER — TELEPHONE (OUTPATIENT)
Dept: PHYSICAL THERAPY | Facility: HOSPITAL | Age: 65
End: 2025-08-20

## 2025-08-21 ENCOUNTER — OFFICE VISIT (OUTPATIENT)
Dept: UROLOGY | Facility: HOSPITAL | Age: 65
End: 2025-08-21
Attending: OBSTETRICS & GYNECOLOGY

## 2025-08-21 VITALS — HEIGHT: 66 IN | BODY MASS INDEX: 20.57 KG/M2 | RESPIRATION RATE: 18 BRPM | WEIGHT: 128 LBS

## 2025-08-21 DIAGNOSIS — N39.41 URGE INCONTINENCE: ICD-10-CM

## 2025-08-21 DIAGNOSIS — N39.3 FEMALE STRESS INCONTINENCE: Primary | ICD-10-CM

## 2025-08-21 LAB
BLOOD URINE: NEGATIVE
CONTROL RUN WITHIN 24 HOURS?: YES
LEUKOCYTE ESTERASE URINE: NEGATIVE
NITRITE URINE: NEGATIVE

## 2025-08-21 PROCEDURE — 51741 ELECTRO-UROFLOWMETRY FIRST: CPT

## 2025-08-21 PROCEDURE — 81002 URINALYSIS NONAUTO W/O SCOPE: CPT

## 2025-08-21 PROCEDURE — 51729 CYSTOMETROGRAM W/VP&UP: CPT

## 2025-08-21 PROCEDURE — 51797 INTRAABDOMINAL PRESSURE TEST: CPT

## 2025-08-21 PROCEDURE — 51784 ANAL/URINARY MUSCLE STUDY: CPT

## 2025-08-22 ENCOUNTER — PATIENT MESSAGE (OUTPATIENT)
Facility: CLINIC | Age: 65
End: 2025-08-22

## 2025-08-28 ENCOUNTER — LAB ENCOUNTER (OUTPATIENT)
Dept: LAB | Age: 65
End: 2025-08-28
Attending: STUDENT IN AN ORGANIZED HEALTH CARE EDUCATION/TRAINING PROGRAM

## 2025-08-28 DIAGNOSIS — R00.2 PALPITATIONS: ICD-10-CM

## 2025-08-28 LAB
ANION GAP SERPL CALC-SCNC: 12 MMOL/L (ref 0–18)
BUN BLD-MCNC: 9 MG/DL (ref 9–23)
CALCIUM BLD-MCNC: 9.5 MG/DL (ref 8.7–10.6)
CHLORIDE SERPL-SCNC: 104 MMOL/L (ref 98–112)
CO2 SERPL-SCNC: 25 MMOL/L (ref 21–32)
CREAT BLD-MCNC: 0.83 MG/DL (ref 0.55–1.02)
EGFRCR SERPLBLD CKD-EPI 2021: 80 ML/MIN/1.73M2 (ref 60–?)
FASTING STATUS PATIENT QL REPORTED: YES
GLUCOSE BLD-MCNC: 77 MG/DL (ref 70–99)
INSULIN SERPL-ACNC: 1.7 MU/L (ref 3–25)
OSMOLALITY SERPL CALC.SUM OF ELEC: 289 MOSM/KG (ref 275–295)
POTASSIUM SERPL-SCNC: 4.1 MMOL/L (ref 3.5–5.1)
SODIUM SERPL-SCNC: 141 MMOL/L (ref 136–145)

## 2025-08-28 PROCEDURE — 83525 ASSAY OF INSULIN: CPT | Performed by: STUDENT IN AN ORGANIZED HEALTH CARE EDUCATION/TRAINING PROGRAM

## 2025-08-28 PROCEDURE — 84206 ASSAY OF PROINSULIN: CPT | Performed by: STUDENT IN AN ORGANIZED HEALTH CARE EDUCATION/TRAINING PROGRAM

## 2025-08-28 PROCEDURE — 80048 BASIC METABOLIC PNL TOTAL CA: CPT | Performed by: STUDENT IN AN ORGANIZED HEALTH CARE EDUCATION/TRAINING PROGRAM

## 2025-08-28 PROCEDURE — 82010 KETONE BODYS QUAN: CPT | Performed by: STUDENT IN AN ORGANIZED HEALTH CARE EDUCATION/TRAINING PROGRAM

## 2025-08-28 PROCEDURE — 84681 ASSAY OF C-PEPTIDE: CPT | Performed by: STUDENT IN AN ORGANIZED HEALTH CARE EDUCATION/TRAINING PROGRAM

## 2025-08-29 ENCOUNTER — PATIENT MESSAGE (OUTPATIENT)
Facility: CLINIC | Age: 65
End: 2025-08-29

## 2025-08-29 DIAGNOSIS — R00.2 PALPITATIONS: ICD-10-CM

## 2025-08-29 LAB
B-HYDROXYBUTYRATE: 0.2 MMOL/L
B-HYDROXYBUTYRATE: 0.2 MMOL/L
C-PEPTIDE: 0.7 NG/ML

## 2025-08-29 RX ORDER — AVOBENZONE, HOMOSALATE, OCTISALATE, OCTOCRYLENE 30; 40; 45; 26 MG/ML; MG/ML; MG/ML; MG/ML
CREAM TOPICAL
Qty: 90 EACH | Refills: 1 | Status: SHIPPED | OUTPATIENT
Start: 2025-08-29

## (undated) DEVICE — GOWN,SIRUS,FAB REINF,RAGLAN,L,STERILE: Brand: MEDLINE

## (undated) DEVICE — SUT ETHLN 3-0 30IN FS-1 NABSRB BLK 24MM 3/8 C

## (undated) DEVICE — SPONGE 4X4 10PK

## (undated) DEVICE — SOLUTION IRRIG 1000ML 0.9% NACL USP BTL

## (undated) DEVICE — BNDG,ELSTC,MATRIX,STRL,4"X5YD,LF,HOOK&LP: Brand: MEDLINE

## (undated) DEVICE — SUT ETHLN 4-0 18IN PS-2 NABSRB BLK 19MM 3/8 C

## (undated) DEVICE — VIOLET BRAIDED (POLYGLACTIN 910), SYNTHETIC ABSORBABLE SUTURE: Brand: COATED VICRYL

## (undated) DEVICE — COTTON UNDERCAST PADDING,REGULAR FINISH: Brand: WEBRIL

## (undated) DEVICE — ZZ--DISC-SUB-495286-SUT COAT VCRL 4-0 27IN SH ABSRB UD 26MM 1/2

## (undated) DEVICE — DISPOSABLE TOURNIQUET CUFF SINGLE BLADDER, DUAL PORT AND QUICK CONNECT CONNECTOR: Brand: COLOR CUFF

## (undated) DEVICE — PAD,ABDOMINAL,8"X7.5",STERILE,LF,1/PK: Brand: MEDLINE

## (undated) DEVICE — GLOVE SUR 7 SENSICARE PI MIC PIP CRM PWD F

## (undated) DEVICE — C-ARM: Brand: UNBRANDED

## (undated) DEVICE — PACK CDS LOWER EXTREMITY

## (undated) DEVICE — BANDAGE,GAUZE,BULKEE II,4.5"X4.1YD,STRL: Brand: MEDLINE

## (undated) DEVICE — STANDARD HYPODERMIC NEEDLE,POLYPROPYLENE HUB: Brand: MONOJECT

## (undated) DEVICE — SUCTION CANISTER, 3000CC,SAFELINER: Brand: DEROYAL

## (undated) NOTE — LETTER
10/29/24  [facility] Orthopedic Surgery   Pre-Operative Clearance Request    Patient Name:   Mone Wolff             :   1960    Surgeon: Dr. Neely             Date of Surgery: 2024     Surgical Procedure: Right Metatarsophalangeal Joint Cheilectomy       PLEASE COMPLETE THE FOLLOWING AND SIGN OFF ON CLEARANCE WITHIN 24 HOURS OF SCHEDULED SURGERY TO AVOID CANCELLATION.     [x]  History and Physical        [x]  Medical  Clearance                                   **Please fax test results, H&P, and clearance to [phone] and to P.A.T at [phone]**

## (undated) NOTE — MR AVS SNAPSHOT
[place]  [address]  [address] [facility] [address] [phone]               Thank you for choosing us for your health care visit with Danish Back MD.  We are glad to serve you and happy to provide you with this summa 25, 2017 (Approximate)    Assoc Dx:  Palpitations [R00.2], Episodic lightheadedness [R42]                 Scheduling Instructions     Tuesday April 25, 2017     Cardiology:  CARD ECHO 2D DOPPLER (DYI=[id number])    Instructions:  IMPORTANT    Your physician has Commonly known as:  BYSTOLIC           Zolpidem Tartrate 5 MG Tabs   TK 1 T PO QD HS   Commonly known as:  [address] [facility]                   MyChart     Visit MyChart  You can access your MyChart to more actively manage your health care and view more details from t

## (undated) NOTE — LETTER
24  [facility] Orthopedic Surgery   Pre-Operative Clearance Request    Patient Name:   Mone Wolff             :   1960    Surgeon: Dr. Neely             Date of Surgery: 2024    Surgical Procedure: Right Metatarsophalangeal Joint Cheilectomy       PLEASE COMPLETE THE FOLLOWING AND SIGN OFF ON CLEARANCE WITHIN 30 DAYS OF SCHEDULED SURGERY TO AVOID CANCELLATION.     [x]  History and Physical        [x]  Medical  Clearance                                   **Please fax test results, H&P, and clearance to [phone] and to P.A.T at [phone]**

## (undated) NOTE — LETTER
05/18/18        [address]  [address]      Dear Talat Irizarry records indicate that you have outstanding lab work and or testing that was ordered for you and has not yet been completed:          LIPID PANEL - CPX      HGB A1C

## (undated) NOTE — ED AVS SNAPSHOT
Natan Luz   MRN: [id number]    Department:  THE [facility] Emergency Department in [place]   Date of Visit:  2/27/2020           Disclosure     Insurance plans vary and the physician(s) referred by the ER may not be covered by your plan.  Please contac tell this physician (or your personal doctor if your instructions are to return to your personal doctor) about any new or lasting problems. The primary care or specialist physician will see patients referred from the [facility] Emergency Department.  Prosper Dalton

## (undated) NOTE — Clinical Note
Date: 10/29/2024    Patient Name: Mone Wolff          To Whom it may concern:    This letter has been written at the patient's request. The above patient was seen at [facility] for treatment of a medical condition.    This patient should be excused from attending work/school from *** through ***.    The patient may return to work/school on *** with the following limitations ***.        Sincerely,    Chelly Neely DPM

## (undated) NOTE — LETTER
06/13/19        [address] [facility]  [address]      Dear Ellaree Bence records indicate that you have outstanding lab work and or testing that was ordered for you and has not yet been completed: Fasting lab work   To complete the lab

## (undated) NOTE — LETTER
10/03/24    Orthopedic Surgery   Pre-Operative Clearance Request    Patient Name:   Mone Wolff             :   1960    Surgeon: Dr. Neely             Date of Surgery: 2024    Surgical Procedure: Right Metatarsophalangeal Joint Cheilectomy       MUST COMPLETE ALL OF THE FOLLOWING 2-3 WEEKS PRIOR TO YOUR SURGERY TO AVOID CANCELLATION, DUE TO THE RULE THEIR WILL BE NO EXCEPTIONS!      [x]  History and Physical        [x]  Medical  Clearance                     Required pre-op testing to be ordered: N/A                           **Please fax test results, H&P, and clearance to [phone] and to P.A.T at [phone]**

## (undated) NOTE — LETTER
10/29/24  [facility] Orthopedic Surgery   Pre-Operative Clearance Request    Patient Name:   Mone Wolff             :   1960    Surgeon: Dr. Neely             Date of Surgery: 2024    Surgical Procedure: Right Metatarsophalangeal Joint Cheilectomy       PLEASE COMPLETE THE FOLLOWING AND SIGN OFF ON CLEARANCE WITHIN 7 DAYS OF SCHEDULED SURGERY TO AVOID CANCELLATION.     [x]  History and Physical        [x]  Medical  Clearance                     **Please fax test results, H&P, and clearance to [phone] and to P.A.T at [phone]**

## (undated) NOTE — MR AVS SNAPSHOT
[place] [id number]               Thank you for choosing us for your health care visit with Barbra Kamara MD.  We are glad to serve you and happy to provide you with this sage Vitamin B1 (Thiamine), Blood    Complete by:  Jun 16, 2017    Assoc Dx:  Paresthesia [R20.2]           Sjogren's AB, Anti-SS-A/-SS-B    Complete by:  Jun 16, 2017 (Approximate)    Assoc Dx:  Paresthesia [R20.2]           Anticardiolipin Antibodies (CRISTIAN), ? Written prescriptions picked up on Fridays must be picked up between the hours of 8:00 AM-1:00 PM.     Scheduling Tests    If your physician has ordered radiology tests such as MRI or CT scans, do not schedule the test until this office has notified you Commonly known as:  Liam Pretty can access your MyChart to more actively manage your health care and view more details from this visit by going to [email or web address].   If you've recently had a stay at t

## (undated) NOTE — ED AVS SNAPSHOT
Dewayne Thurston   MRN: [id number]    Department:  THE [facility] Emergency Department in [place]   Date of Visit:  3/20/2018           Disclosure     Insurance plans vary and the physician(s) referred by the ER may not be covered by your plan.  Please contac tell this physician (or your personal doctor if your instructions are to return to your personal doctor) about any new or lasting problems. The primary care or specialist physician will see patients referred from the [facility] Emergency Department.  Lobo Rothman

## (undated) NOTE — LETTER
24    [facility] Orthopedic Surgery   Pre-Operative Clearance Request    Patient Name:   Mone Wolff             :   1960    Surgeon: Dr. Neely             Date of Surgery: 2024    Surgical Procedure: Right Metatarsophalangeal Joint Cheilectomy       PLEASE COMPLETE THE FOLLOWING AND SIGN OFF ON CLEARANCE WITHIN 7 DAYS  OF SCHEDULED SURGERY TO AVOID CANCELLATION.     [x]  History and Physical      [x]  Medical  Clearance                                     **Please fax test results, H&P, and clearance to [phone] and to P.A.T at [phone]**

## (undated) NOTE — LETTER
07/23/18        [address] [facility]  [address]      Dear Rafaela Ramos records indicate that you have outstanding lab work and or testing that was ordered for you and has not yet been completed:          Immune Status Panel,MMR  To pr

## (undated) NOTE — Clinical Note
Date: 10/29/2024    Patient Name: Mone Wolff          To Whom it may concern:    This letter has been written at the patient's request. The above patient was seen at [facility] for treatment of a medical condition.    This patient should be excused from attending work/school from *** through ***.    The patient may return to work/school on *** with the following limitations ***.        Sincerely,    Chelly Neely DPM

## (undated) NOTE — MR AVS SNAPSHOT
[place]  [address] [facility] [facility] [address]  [facility] [address]  [phone]               Thank you for choosing us for your health care visit with Fanny Ceja MD.  We are glad to serve you and happy to provide you with this summa chest or down the arm. To understand this condition, it helps to understand the parts of the spine:  · Vertebrae. These are bones that stack to form the spine. The cervical spine contains the 7 vertebrae in the neck. · Disks.  These are soft pads of tissue · Cold packs. These help reduce pain. · Resting. This involves avoiding positions and activities that increase pain. · Neck brace (cervical collar). This can help relieve inflammation and pain. · Physical therapy, including exercises and stretches.  This You can access your MyChart to more actively manage your health care and view more details from this visit by going to [email or web address].   If you've recently had a stay at the Hospital you can access your discharge instructions in [address] by cal

## (undated) NOTE — LETTER
[facility], [address] [place] [address]  Ph:[phone]  Fax:[phone]        Patient Information:  Patient Name:   Address: Mone Wolff, ([id number])  [address] [place] [address]  [phone] (home)  Sex: female          : 1960   ________________________________________________________________  Referral Information:  Order Date: 2025       Epic Order #: [id number]   Referral Type: DME - External Dx: Plantar fasciitis (M72.2)  Hallux rigidus of right foot (M20.21)   Signed Referral [place]:     What DME is needed: Please dispense custom functional orthotics     Number of Visits: 1           ______________________________________________________________  Scheduling Information:           **REFERRAL REQUEST**     Your physician has referred you to a specialist.  Your physician or the clinic staff will provide you with the phone number you should call to schedule your appointment.      If you are confident that your benefit plan will not require a referral or authorization, such as [place] Medicaid, please feel free to schedule your appointment immediately. However, if you are unsure about the requirements for authorization, please wait 5-7 days and then contact your physician's office. At that time, you will be provided with any authorization numbers or be assured that none are required. You can then schedule your appointment. Failure to obtain required authorization numbers can create reimbursement difficulties for you.     _______________________________________________________        Coverage Information:      Active Insurance as of 2025             Primary Coverage      Payor Plan Insurance Group Employer/Plan Group     [place] [id number]       Payor Plan Address Payor Plan Phone Number Payor Plan Fax Number Effective Dates     [address]     2017 - None Entered     [facility] [address]            Subscriber Name Subscriber Birth Date Member ID          RAO CATALAN 12/22/1960 [id number]       Guarantor Name (ID) Guarantor Birth Date Guarantor Address Guarantor Type     RAO CATALAN ([id number]) 12/22/1960 [address] Personal/Family         [place] [address]                   SIGNED ORDER ON FILE  Authorizing Provider: Chelly Neely DPM [NPI: [id number]]  Date: Apr 25, 2025 at 9:42 AM  Ordering Department: [facility] [place]